# Patient Record
Sex: FEMALE | Race: WHITE | NOT HISPANIC OR LATINO | Employment: FULL TIME | URBAN - METROPOLITAN AREA
[De-identification: names, ages, dates, MRNs, and addresses within clinical notes are randomized per-mention and may not be internally consistent; named-entity substitution may affect disease eponyms.]

---

## 2017-02-22 DIAGNOSIS — Z79.899 ENCOUNTER FOR LONG-TERM CURRENT USE OF MEDICATION: ICD-10-CM

## 2017-02-22 DIAGNOSIS — D84.9 IMMUNOSUPPRESSED STATUS (H): ICD-10-CM

## 2017-02-22 DIAGNOSIS — Z48.298 AFTERCARE FOLLOWING ORGAN TRANSPLANT: ICD-10-CM

## 2017-02-22 DIAGNOSIS — Z94.0 KIDNEY REPLACED BY TRANSPLANT: ICD-10-CM

## 2017-02-22 LAB
ANION GAP SERPL CALCULATED.3IONS-SCNC: 9 MMOL/L (ref 3–14)
BUN SERPL-MCNC: 18 MG/DL (ref 7–30)
CALCIUM SERPL-MCNC: 9.3 MG/DL (ref 8.5–10.1)
CHLORIDE SERPL-SCNC: 108 MMOL/L (ref 94–109)
CO2 SERPL-SCNC: 24 MMOL/L (ref 20–32)
CREAT SERPL-MCNC: 1.25 MG/DL (ref 0.52–1.04)
ERYTHROCYTE [DISTWIDTH] IN BLOOD BY AUTOMATED COUNT: 13.8 % (ref 10–15)
GFR SERPL CREATININE-BSD FRML MDRD: 45 ML/MIN/1.7M2
GLUCOSE SERPL-MCNC: 115 MG/DL (ref 70–99)
HCT VFR BLD AUTO: 40.3 % (ref 35–47)
HGB BLD-MCNC: 13.3 G/DL (ref 11.7–15.7)
MCH RBC QN AUTO: 28.4 PG (ref 26.5–33)
MCHC RBC AUTO-ENTMCNC: 33 G/DL (ref 31.5–36.5)
MCV RBC AUTO: 86 FL (ref 78–100)
PLATELET # BLD AUTO: 208 10E9/L (ref 150–450)
POTASSIUM SERPL-SCNC: 4 MMOL/L (ref 3.4–5.3)
RBC # BLD AUTO: 4.69 10E12/L (ref 3.8–5.2)
SODIUM SERPL-SCNC: 141 MMOL/L (ref 133–144)
TACROLIMUS BLD-MCNC: 3 UG/L (ref 5–15)
TME LAST DOSE: ABNORMAL H
WBC # BLD AUTO: 4.9 10E9/L (ref 4–11)

## 2017-02-22 PROCEDURE — 36415 COLL VENOUS BLD VENIPUNCTURE: CPT | Performed by: FAMILY MEDICINE

## 2017-02-22 PROCEDURE — 80048 BASIC METABOLIC PNL TOTAL CA: CPT | Performed by: FAMILY MEDICINE

## 2017-02-22 PROCEDURE — 80180 DRUG SCRN QUAN MYCOPHENOLATE: CPT | Performed by: FAMILY MEDICINE

## 2017-02-22 PROCEDURE — 80197 ASSAY OF TACROLIMUS: CPT | Performed by: FAMILY MEDICINE

## 2017-02-22 PROCEDURE — 85027 COMPLETE CBC AUTOMATED: CPT | Performed by: FAMILY MEDICINE

## 2017-02-24 LAB
MYCOPHENOLATE SERPL LC/MS/MS-MCNC: 1.94 MG/L (ref 1–3.5)
MYCOPHENOLATE-G SERPL LC/MS/MS-MCNC: 39.5 MG/L (ref 30–95)
TME LAST DOSE: NORMAL H

## 2017-04-19 DIAGNOSIS — D84.9 IMMUNOSUPPRESSED STATUS (H): ICD-10-CM

## 2017-04-19 DIAGNOSIS — Z79.899 ENCOUNTER FOR LONG-TERM CURRENT USE OF MEDICATION: ICD-10-CM

## 2017-04-19 DIAGNOSIS — Z94.0 KIDNEY REPLACED BY TRANSPLANT: ICD-10-CM

## 2017-04-19 DIAGNOSIS — Z48.298 AFTERCARE FOLLOWING ORGAN TRANSPLANT: ICD-10-CM

## 2017-04-19 LAB
ANION GAP SERPL CALCULATED.3IONS-SCNC: 9 MMOL/L (ref 3–14)
BUN SERPL-MCNC: 17 MG/DL (ref 7–30)
CALCIUM SERPL-MCNC: 9.5 MG/DL (ref 8.5–10.1)
CHLORIDE SERPL-SCNC: 108 MMOL/L (ref 94–109)
CO2 SERPL-SCNC: 24 MMOL/L (ref 20–32)
CREAT SERPL-MCNC: 1.31 MG/DL (ref 0.52–1.04)
ERYTHROCYTE [DISTWIDTH] IN BLOOD BY AUTOMATED COUNT: 13.7 % (ref 10–15)
GFR SERPL CREATININE-BSD FRML MDRD: 43 ML/MIN/1.7M2
GLUCOSE SERPL-MCNC: 90 MG/DL (ref 70–99)
HCT VFR BLD AUTO: 41.7 % (ref 35–47)
HGB BLD-MCNC: 14.1 G/DL (ref 11.7–15.7)
MCH RBC QN AUTO: 28.8 PG (ref 26.5–33)
MCHC RBC AUTO-ENTMCNC: 33.8 G/DL (ref 31.5–36.5)
MCV RBC AUTO: 85 FL (ref 78–100)
PLATELET # BLD AUTO: 237 10E9/L (ref 150–450)
POTASSIUM SERPL-SCNC: 3.9 MMOL/L (ref 3.4–5.3)
RBC # BLD AUTO: 4.89 10E12/L (ref 3.8–5.2)
SODIUM SERPL-SCNC: 141 MMOL/L (ref 133–144)
TACROLIMUS BLD-MCNC: 3.9 UG/L (ref 5–15)
TME LAST DOSE: ABNORMAL H
WBC # BLD AUTO: 4.8 10E9/L (ref 4–11)

## 2017-04-19 PROCEDURE — 80197 ASSAY OF TACROLIMUS: CPT | Performed by: INTERNAL MEDICINE

## 2017-04-19 PROCEDURE — 80180 DRUG SCRN QUAN MYCOPHENOLATE: CPT | Performed by: INTERNAL MEDICINE

## 2017-04-19 PROCEDURE — 85027 COMPLETE CBC AUTOMATED: CPT | Performed by: INTERNAL MEDICINE

## 2017-04-19 PROCEDURE — 80048 BASIC METABOLIC PNL TOTAL CA: CPT | Performed by: INTERNAL MEDICINE

## 2017-04-19 PROCEDURE — 36415 COLL VENOUS BLD VENIPUNCTURE: CPT | Performed by: INTERNAL MEDICINE

## 2017-04-20 LAB
MYCOPHENOLATE SERPL LC/MS/MS-MCNC: 1.35 MG/L (ref 1–3.5)
MYCOPHENOLATE-G SERPL LC/MS/MS-MCNC: 42.5 MG/L (ref 30–95)
TME LAST DOSE: NORMAL H

## 2017-06-21 ENCOUNTER — RESULTS ONLY (OUTPATIENT)
Dept: OTHER | Facility: CLINIC | Age: 52
End: 2017-06-21

## 2017-06-21 DIAGNOSIS — Z94.0 KIDNEY REPLACED BY TRANSPLANT: ICD-10-CM

## 2017-06-21 DIAGNOSIS — Z48.298 AFTERCARE FOLLOWING ORGAN TRANSPLANT: ICD-10-CM

## 2017-06-21 DIAGNOSIS — Z79.899 ENCOUNTER FOR LONG-TERM CURRENT USE OF MEDICATION: ICD-10-CM

## 2017-06-21 DIAGNOSIS — D84.9 IMMUNOSUPPRESSED STATUS (H): ICD-10-CM

## 2017-06-21 LAB
ANION GAP SERPL CALCULATED.3IONS-SCNC: 9 MMOL/L (ref 3–14)
BUN SERPL-MCNC: 17 MG/DL (ref 7–30)
CALCIUM SERPL-MCNC: 9.2 MG/DL (ref 8.5–10.1)
CHLORIDE SERPL-SCNC: 108 MMOL/L (ref 94–109)
CO2 SERPL-SCNC: 24 MMOL/L (ref 20–32)
CREAT SERPL-MCNC: 1.22 MG/DL (ref 0.52–1.04)
ERYTHROCYTE [DISTWIDTH] IN BLOOD BY AUTOMATED COUNT: 14.4 % (ref 10–15)
GFR SERPL CREATININE-BSD FRML MDRD: 46 ML/MIN/1.7M2
GLUCOSE SERPL-MCNC: 90 MG/DL (ref 70–99)
HCT VFR BLD AUTO: 41.3 % (ref 35–47)
HGB BLD-MCNC: 13.6 G/DL (ref 11.7–15.7)
MCH RBC QN AUTO: 29.1 PG (ref 26.5–33)
MCHC RBC AUTO-ENTMCNC: 32.9 G/DL (ref 31.5–36.5)
MCV RBC AUTO: 88 FL (ref 78–100)
PLATELET # BLD AUTO: 249 10E9/L (ref 150–450)
POTASSIUM SERPL-SCNC: 3.5 MMOL/L (ref 3.4–5.3)
PROT UR-MCNC: 0.23 G/L
PROT/CREAT 24H UR: 0.14 G/G CR (ref 0–0.2)
RBC # BLD AUTO: 4.67 10E12/L (ref 3.8–5.2)
SODIUM SERPL-SCNC: 141 MMOL/L (ref 133–144)
TACROLIMUS BLD-MCNC: ABNORMAL UG/L (ref 5–15)
TME LAST DOSE: ABNORMAL H
WBC # BLD AUTO: 5.7 10E9/L (ref 4–11)

## 2017-06-21 PROCEDURE — 80197 ASSAY OF TACROLIMUS: CPT | Performed by: INTERNAL MEDICINE

## 2017-06-21 PROCEDURE — 87799 DETECT AGENT NOS DNA QUANT: CPT | Performed by: INTERNAL MEDICINE

## 2017-06-21 PROCEDURE — 80048 BASIC METABOLIC PNL TOTAL CA: CPT | Performed by: INTERNAL MEDICINE

## 2017-06-21 PROCEDURE — 36415 COLL VENOUS BLD VENIPUNCTURE: CPT | Performed by: INTERNAL MEDICINE

## 2017-06-21 PROCEDURE — 85027 COMPLETE CBC AUTOMATED: CPT | Performed by: INTERNAL MEDICINE

## 2017-06-21 PROCEDURE — 80180 DRUG SCRN QUAN MYCOPHENOLATE: CPT | Performed by: INTERNAL MEDICINE

## 2017-06-21 PROCEDURE — 86832 HLA CLASS I HIGH DEFIN QUAL: CPT | Performed by: INTERNAL MEDICINE

## 2017-06-21 PROCEDURE — 84156 ASSAY OF PROTEIN URINE: CPT | Performed by: INTERNAL MEDICINE

## 2017-06-21 PROCEDURE — 86833 HLA CLASS II HIGH DEFIN QUAL: CPT | Performed by: INTERNAL MEDICINE

## 2017-06-22 ENCOUNTER — TELEPHONE (OUTPATIENT)
Dept: TRANSPLANT | Facility: CLINIC | Age: 52
End: 2017-06-22

## 2017-06-22 DIAGNOSIS — Z94.0 KIDNEY REPLACED BY TRANSPLANT: Primary | ICD-10-CM

## 2017-06-22 LAB — PRA DONOR SPECIFIC ABY: NORMAL

## 2017-06-22 NOTE — TELEPHONE ENCOUNTER
Call placed to patient: Patient states that she missed a couple of doses last week and verbalize understanding to recheck level in two weeks. Order placed

## 2017-06-22 NOTE — TELEPHONE ENCOUNTER
ISSUE: tacrolimus = < 3.0 (Goal 3-5)  Previous levels within goal.    LPN task:  Confirm good 12 hour trough and no missed doses.  Confirm taking Tacrolimus 1 mg BID.  Have levels rechecked soon so we can change dose PRN.

## 2017-06-23 LAB
BKV DNA # SPEC NAA+PROBE: NORMAL COPIES/ML
BKV DNA SPEC NAA+PROBE-LOG#: NORMAL LOG COPIES/ML
MYCOPHENOLATE SERPL LC/MS/MS-MCNC: 1.38 MG/L (ref 1–3.5)
MYCOPHENOLATE-G SERPL LC/MS/MS-MCNC: 34.7 MG/L (ref 30–95)
SPECIMEN SOURCE: NORMAL
TME LAST DOSE: NORMAL H

## 2017-07-11 LAB
DONOR IDENTIFICATION: NORMAL
DSA COMMENTS: NORMAL
DSA PRESENT: NO
DSA TEST METHOD: NORMAL
ORGAN: NORMAL
PROTOCOL CUTOFF: NORMAL
SA1 CELL: NORMAL
SA1 COMMENTS: NORMAL
SA1 HI RISK ABY: NORMAL
SA1 MOD RISK ABY: NORMAL
SA1 TEST METHOD: NORMAL
SA2 CELL: NORMAL
SA2 COMMENTS: NORMAL
SA2 HI RISK ABY UA: NORMAL
SA2 MOD RISK ABY: NORMAL
SA2 TEST METHOD: NORMAL
UNACCEPTABLE ANTIGEN: NORMAL
UNOS CPRA: 81

## 2017-08-16 ENCOUNTER — TELEPHONE (OUTPATIENT)
Dept: TRANSPLANT | Facility: CLINIC | Age: 52
End: 2017-08-16

## 2017-08-16 DIAGNOSIS — Z94.0 KIDNEY REPLACED BY TRANSPLANT: ICD-10-CM

## 2017-08-16 DIAGNOSIS — Z94.0 KIDNEY TRANSPLANTED: ICD-10-CM

## 2017-08-16 DIAGNOSIS — Z79.899 ENCOUNTER FOR LONG-TERM CURRENT USE OF MEDICATION: ICD-10-CM

## 2017-08-16 DIAGNOSIS — Z48.298 AFTERCARE FOLLOWING ORGAN TRANSPLANT: ICD-10-CM

## 2017-08-16 DIAGNOSIS — Z94.0 KIDNEY TRANSPLANTED: Primary | ICD-10-CM

## 2017-08-16 LAB
ANION GAP SERPL CALCULATED.3IONS-SCNC: 10 MMOL/L (ref 3–14)
BUN SERPL-MCNC: 21 MG/DL (ref 7–30)
CALCIUM SERPL-MCNC: 9.3 MG/DL (ref 8.5–10.1)
CHLORIDE SERPL-SCNC: 110 MMOL/L (ref 94–109)
CO2 SERPL-SCNC: 23 MMOL/L (ref 20–32)
CREAT SERPL-MCNC: 1.33 MG/DL (ref 0.52–1.04)
ERYTHROCYTE [DISTWIDTH] IN BLOOD BY AUTOMATED COUNT: 13.6 % (ref 10–15)
GFR SERPL CREATININE-BSD FRML MDRD: 42 ML/MIN/1.7M2
GLUCOSE SERPL-MCNC: 105 MG/DL (ref 70–99)
HCT VFR BLD AUTO: 39.5 % (ref 35–47)
HGB BLD-MCNC: 13.3 G/DL (ref 11.7–15.7)
MCH RBC QN AUTO: 29 PG (ref 26.5–33)
MCHC RBC AUTO-ENTMCNC: 33.7 G/DL (ref 31.5–36.5)
MCV RBC AUTO: 86 FL (ref 78–100)
PLATELET # BLD AUTO: 223 10E9/L (ref 150–450)
POTASSIUM SERPL-SCNC: 3.9 MMOL/L (ref 3.4–5.3)
RBC # BLD AUTO: 4.58 10E12/L (ref 3.8–5.2)
SODIUM SERPL-SCNC: 143 MMOL/L (ref 133–144)
WBC # BLD AUTO: 5.3 10E9/L (ref 4–11)

## 2017-08-16 PROCEDURE — 80180 DRUG SCRN QUAN MYCOPHENOLATE: CPT | Performed by: INTERNAL MEDICINE

## 2017-08-16 PROCEDURE — 80197 ASSAY OF TACROLIMUS: CPT | Performed by: INTERNAL MEDICINE

## 2017-08-16 PROCEDURE — 80048 BASIC METABOLIC PNL TOTAL CA: CPT | Performed by: INTERNAL MEDICINE

## 2017-08-16 PROCEDURE — 36415 COLL VENOUS BLD VENIPUNCTURE: CPT | Performed by: INTERNAL MEDICINE

## 2017-08-16 PROCEDURE — 85027 COMPLETE CBC AUTOMATED: CPT | Performed by: INTERNAL MEDICINE

## 2017-08-16 NOTE — TELEPHONE ENCOUNTER
Spoke to patient who wanted Mycophenolic acid level added back on to her txp lab orders.  Patient states that they have always checked this level at every txp lab draw.

## 2017-08-16 NOTE — TELEPHONE ENCOUNTER
Please add updated lab order in EPIC  Awa was in this am 08/16/17 [facility] lab;  Lab caleb her six month lab with [facility].  Please call Awa she had some questions regarding changing to a new outside nephrologist and medications refills.  Awa's  # [phone]

## 2017-08-17 LAB
TACROLIMUS BLD-MCNC: 3.3 UG/L (ref 5–15)
TME LAST DOSE: ABNORMAL H

## 2017-08-21 LAB
MYCOPHENOLATE SERPL LC/MS/MS-MCNC: 1.7 MG/L (ref 1–3.5)
MYCOPHENOLATE-G SERPL LC/MS/MS-MCNC: 37.6 MG/L (ref 30–95)
TME LAST DOSE: NORMAL H

## 2017-08-29 ENCOUNTER — TRANSFERRED RECORDS (OUTPATIENT)
Dept: HEALTH INFORMATION MANAGEMENT | Facility: CLINIC | Age: 52
End: 2017-08-29

## 2017-09-06 DIAGNOSIS — Z94.0 KIDNEY TRANSPLANTED: Primary | ICD-10-CM

## 2017-09-06 RX ORDER — SULFAMETHOXAZOLE AND TRIMETHOPRIM 400; 80 MG/1; MG/1
1 TABLET ORAL EVERY OTHER DAY
Qty: 45 TABLET | Refills: 0 | Status: SHIPPED | OUTPATIENT
Start: 2017-09-06 | End: 2020-06-10

## 2017-09-08 ENCOUNTER — TRANSFERRED RECORDS (OUTPATIENT)
Dept: HEALTH INFORMATION MANAGEMENT | Facility: CLINIC | Age: 52
End: 2017-09-08

## 2017-11-02 DIAGNOSIS — Z79.899 ENCOUNTER FOR LONG-TERM CURRENT USE OF MEDICATION: ICD-10-CM

## 2017-11-02 DIAGNOSIS — Z48.298 AFTERCARE FOLLOWING ORGAN TRANSPLANT: ICD-10-CM

## 2017-11-02 DIAGNOSIS — Z94.0 KIDNEY REPLACED BY TRANSPLANT: ICD-10-CM

## 2017-11-02 DIAGNOSIS — Z94.0 KIDNEY TRANSPLANTED: ICD-10-CM

## 2017-11-02 LAB
ERYTHROCYTE [DISTWIDTH] IN BLOOD BY AUTOMATED COUNT: 14.1 % (ref 10–15)
HCT VFR BLD AUTO: 41.4 % (ref 35–47)
HGB BLD-MCNC: 13.5 G/DL (ref 11.7–15.7)
MCH RBC QN AUTO: 28.5 PG (ref 26.5–33)
MCHC RBC AUTO-ENTMCNC: 32.6 G/DL (ref 31.5–36.5)
MCV RBC AUTO: 88 FL (ref 78–100)
PLATELET # BLD AUTO: 235 10E9/L (ref 150–450)
RBC # BLD AUTO: 4.73 10E12/L (ref 3.8–5.2)
WBC # BLD AUTO: 4.7 10E9/L (ref 4–11)

## 2017-11-02 PROCEDURE — 85027 COMPLETE CBC AUTOMATED: CPT | Performed by: INTERNAL MEDICINE

## 2017-11-02 PROCEDURE — 36415 COLL VENOUS BLD VENIPUNCTURE: CPT | Performed by: INTERNAL MEDICINE

## 2017-11-02 PROCEDURE — 80048 BASIC METABOLIC PNL TOTAL CA: CPT | Performed by: INTERNAL MEDICINE

## 2017-11-02 PROCEDURE — 80197 ASSAY OF TACROLIMUS: CPT | Performed by: INTERNAL MEDICINE

## 2017-11-02 PROCEDURE — 80180 DRUG SCRN QUAN MYCOPHENOLATE: CPT | Performed by: INTERNAL MEDICINE

## 2017-11-03 LAB
ANION GAP SERPL CALCULATED.3IONS-SCNC: 7 MMOL/L (ref 3–14)
BUN SERPL-MCNC: 19 MG/DL (ref 7–30)
CALCIUM SERPL-MCNC: 9.4 MG/DL (ref 8.5–10.1)
CHLORIDE SERPL-SCNC: 106 MMOL/L (ref 94–109)
CO2 SERPL-SCNC: 26 MMOL/L (ref 20–32)
CREAT SERPL-MCNC: 1.21 MG/DL (ref 0.52–1.04)
GFR SERPL CREATININE-BSD FRML MDRD: 47 ML/MIN/1.7M2
GLUCOSE SERPL-MCNC: 122 MG/DL (ref 70–99)
POTASSIUM SERPL-SCNC: 4.3 MMOL/L (ref 3.4–5.3)
SODIUM SERPL-SCNC: 139 MMOL/L (ref 133–144)
TACROLIMUS BLD-MCNC: 5.1 UG/L (ref 5–15)
TME LAST DOSE: NORMAL H

## 2017-11-04 LAB
MYCOPHENOLATE SERPL LC/MS/MS-MCNC: 1.48 MG/L (ref 1–3.5)
MYCOPHENOLATE-G SERPL LC/MS/MS-MCNC: 41.3 MG/L (ref 30–95)
TME LAST DOSE: NORMAL H

## 2018-01-10 ENCOUNTER — TELEPHONE (OUTPATIENT)
Dept: TRANSPLANT | Facility: CLINIC | Age: 53
End: 2018-01-10

## 2018-01-10 DIAGNOSIS — Z94.0 KIDNEY TRANSPLANTED: ICD-10-CM

## 2018-01-10 DIAGNOSIS — Z48.298 AFTERCARE FOLLOWING ORGAN TRANSPLANT: Primary | ICD-10-CM

## 2018-01-10 DIAGNOSIS — Z94.0 KIDNEY REPLACED BY TRANSPLANT: ICD-10-CM

## 2018-01-10 DIAGNOSIS — Z79.899 ENCOUNTER FOR LONG-TERM CURRENT USE OF MEDICATION: ICD-10-CM

## 2018-01-10 DIAGNOSIS — Z48.298 AFTERCARE FOLLOWING ORGAN TRANSPLANT: ICD-10-CM

## 2018-01-10 LAB
ANION GAP SERPL CALCULATED.3IONS-SCNC: 10 MMOL/L (ref 3–14)
BUN SERPL-MCNC: 16 MG/DL (ref 7–30)
CALCIUM SERPL-MCNC: 8.9 MG/DL (ref 8.5–10.1)
CHLORIDE SERPL-SCNC: 108 MMOL/L (ref 94–109)
CO2 SERPL-SCNC: 23 MMOL/L (ref 20–32)
CREAT SERPL-MCNC: 1.17 MG/DL (ref 0.52–1.04)
ERYTHROCYTE [DISTWIDTH] IN BLOOD BY AUTOMATED COUNT: 13.5 % (ref 10–15)
GFR SERPL CREATININE-BSD FRML MDRD: 48 ML/MIN/1.7M2
GLUCOSE SERPL-MCNC: 125 MG/DL (ref 70–99)
HCT VFR BLD AUTO: 40.1 % (ref 35–47)
HGB BLD-MCNC: 13.7 G/DL (ref 11.7–15.7)
MCH RBC QN AUTO: 29.4 PG (ref 26.5–33)
MCHC RBC AUTO-ENTMCNC: 34.2 G/DL (ref 31.5–36.5)
MCV RBC AUTO: 86 FL (ref 78–100)
PLATELET # BLD AUTO: 244 10E9/L (ref 150–450)
POTASSIUM SERPL-SCNC: 4 MMOL/L (ref 3.4–5.3)
RBC # BLD AUTO: 4.66 10E12/L (ref 3.8–5.2)
SODIUM SERPL-SCNC: 141 MMOL/L (ref 133–144)
TACROLIMUS BLD-MCNC: 5.3 UG/L (ref 5–15)
TME LAST DOSE: 700 H
WBC # BLD AUTO: 6 10E9/L (ref 4–11)

## 2018-01-10 PROCEDURE — 80197 ASSAY OF TACROLIMUS: CPT | Performed by: INTERNAL MEDICINE

## 2018-01-10 PROCEDURE — 80048 BASIC METABOLIC PNL TOTAL CA: CPT | Performed by: INTERNAL MEDICINE

## 2018-01-10 PROCEDURE — 36415 COLL VENOUS BLD VENIPUNCTURE: CPT | Performed by: INTERNAL MEDICINE

## 2018-01-10 PROCEDURE — 85027 COMPLETE CBC AUTOMATED: CPT | Performed by: INTERNAL MEDICINE

## 2018-01-10 PROCEDURE — 80180 DRUG SCRN QUAN MYCOPHENOLATE: CPT | Performed by: INTERNAL MEDICINE

## 2018-01-10 NOTE — LETTER
[facility]  Room 2-200  [facility],  [address]  [address]  Tel [phone]  Toll Free [phone]                OUTPATIENT LABORATORY TEST ORDER    Patient Name: Awa Alexander  Transplant Date: 7/22/2011 (Kidney)  YOB: 1965  Issue Date & Time:January 10, 37187:36 AM    [facility] MR:  [id number]  Exp. Date (1 year after date issued)      Diagnoses: Kidney Transplant (ICD-10  Z94.0)   Long term use of medications (ICD-10  Z79.899)     Lab results to be available on the same day drawn.   Patient should release information to the [facility], [facility].  Please fax to the Transplant Center at [phone].    Every 3 Months    ?Hemogram and Platelet   ?Basic Metabolic Panel (Sodium, Potassium, Chloride, CO2, Creatinine, BUN, Glucose, Calcium)   ?/Tacrolimus/Prograf drug level (mail to [facility] - mailers and instructions provided by the patient)            Every 6 Months                  ?Urine for protein/creatinine        If you have any questions, please call [facility] at [phone] or [phone].    Please fax labs to [phone]

## 2018-01-12 LAB
MYCOPHENOLATE SERPL LC/MS/MS-MCNC: 1.32 MG/L (ref 1–3.5)
MYCOPHENOLATE-G SERPL LC/MS/MS-MCNC: 27.3 MG/L (ref 30–95)
TME LAST DOSE: 700 H

## 2018-04-11 DIAGNOSIS — Z94.0 KIDNEY TRANSPLANTED: ICD-10-CM

## 2018-04-11 DIAGNOSIS — Z48.298 AFTERCARE FOLLOWING ORGAN TRANSPLANT: ICD-10-CM

## 2018-04-11 DIAGNOSIS — Z94.0 KIDNEY REPLACED BY TRANSPLANT: ICD-10-CM

## 2018-04-11 DIAGNOSIS — Z79.899 ENCOUNTER FOR LONG-TERM CURRENT USE OF MEDICATION: ICD-10-CM

## 2018-04-11 LAB
ANION GAP SERPL CALCULATED.3IONS-SCNC: 8 MMOL/L (ref 3–14)
BUN SERPL-MCNC: 19 MG/DL (ref 7–30)
CALCIUM SERPL-MCNC: 9.2 MG/DL (ref 8.5–10.1)
CHLORIDE SERPL-SCNC: 111 MMOL/L (ref 94–109)
CO2 SERPL-SCNC: 24 MMOL/L (ref 20–32)
CREAT SERPL-MCNC: 1.41 MG/DL (ref 0.52–1.04)
ERYTHROCYTE [DISTWIDTH] IN BLOOD BY AUTOMATED COUNT: 14.6 % (ref 10–15)
GFR SERPL CREATININE-BSD FRML MDRD: 39 ML/MIN/1.7M2
GLUCOSE SERPL-MCNC: 50 MG/DL (ref 70–99)
HCT VFR BLD AUTO: 43.5 % (ref 35–47)
HGB BLD-MCNC: 14.1 G/DL (ref 11.7–15.7)
MCH RBC QN AUTO: 28 PG (ref 26.5–33)
MCHC RBC AUTO-ENTMCNC: 32.4 G/DL (ref 31.5–36.5)
MCV RBC AUTO: 86 FL (ref 78–100)
PLATELET # BLD AUTO: 263 10E9/L (ref 150–450)
POTASSIUM SERPL-SCNC: 4 MMOL/L (ref 3.4–5.3)
RBC # BLD AUTO: 5.04 10E12/L (ref 3.8–5.2)
SODIUM SERPL-SCNC: 143 MMOL/L (ref 133–144)
TACROLIMUS BLD-MCNC: 5.7 UG/L (ref 5–15)
TME LAST DOSE: NORMAL H
WBC # BLD AUTO: 6.3 10E9/L (ref 4–11)

## 2018-04-11 PROCEDURE — 36415 COLL VENOUS BLD VENIPUNCTURE: CPT | Performed by: INTERNAL MEDICINE

## 2018-04-11 PROCEDURE — 85027 COMPLETE CBC AUTOMATED: CPT | Performed by: INTERNAL MEDICINE

## 2018-04-11 PROCEDURE — 80048 BASIC METABOLIC PNL TOTAL CA: CPT | Performed by: INTERNAL MEDICINE

## 2018-04-11 PROCEDURE — 80197 ASSAY OF TACROLIMUS: CPT | Performed by: INTERNAL MEDICINE

## 2018-04-11 PROCEDURE — 80180 DRUG SCRN QUAN MYCOPHENOLATE: CPT | Performed by: INTERNAL MEDICINE

## 2018-04-14 LAB
MYCOPHENOLATE SERPL LC/MS/MS-MCNC: 1.99 MG/L (ref 1–3.5)
MYCOPHENOLATE-G SERPL LC/MS/MS-MCNC: 42.2 MG/L (ref 30–95)
TME LAST DOSE: NORMAL H

## 2018-07-11 DIAGNOSIS — Z79.899 ENCOUNTER FOR LONG-TERM CURRENT USE OF MEDICATION: ICD-10-CM

## 2018-07-11 DIAGNOSIS — Z94.0 KIDNEY TRANSPLANTED: ICD-10-CM

## 2018-07-11 DIAGNOSIS — Z94.0 KIDNEY REPLACED BY TRANSPLANT: ICD-10-CM

## 2018-07-11 DIAGNOSIS — Z48.298 AFTERCARE FOLLOWING ORGAN TRANSPLANT: ICD-10-CM

## 2018-07-11 LAB
ANION GAP SERPL CALCULATED.3IONS-SCNC: 13 MMOL/L (ref 3–14)
BUN SERPL-MCNC: 22 MG/DL (ref 7–30)
CALCIUM SERPL-MCNC: 9.1 MG/DL (ref 8.5–10.1)
CHLORIDE SERPL-SCNC: 107 MMOL/L (ref 94–109)
CO2 SERPL-SCNC: 19 MMOL/L (ref 20–32)
CREAT SERPL-MCNC: 1.25 MG/DL (ref 0.52–1.04)
ERYTHROCYTE [DISTWIDTH] IN BLOOD BY AUTOMATED COUNT: 13.7 % (ref 10–15)
GFR SERPL CREATININE-BSD FRML MDRD: 45 ML/MIN/1.7M2
GLUCOSE SERPL-MCNC: 143 MG/DL (ref 70–99)
HCT VFR BLD AUTO: 40.5 % (ref 35–47)
HGB BLD-MCNC: 13.5 G/DL (ref 11.7–15.7)
MCH RBC QN AUTO: 29.7 PG (ref 26.5–33)
MCHC RBC AUTO-ENTMCNC: 33.3 G/DL (ref 31.5–36.5)
MCV RBC AUTO: 89 FL (ref 78–100)
PLATELET # BLD AUTO: 222 10E9/L (ref 150–450)
POTASSIUM SERPL-SCNC: 4.2 MMOL/L (ref 3.4–5.3)
RBC # BLD AUTO: 4.54 10E12/L (ref 3.8–5.2)
SODIUM SERPL-SCNC: 139 MMOL/L (ref 133–144)
TACROLIMUS BLD-MCNC: 6.3 UG/L (ref 5–15)
TME LAST DOSE: NORMAL H
WBC # BLD AUTO: 5.8 10E9/L (ref 4–11)

## 2018-07-11 PROCEDURE — 80197 ASSAY OF TACROLIMUS: CPT | Performed by: INTERNAL MEDICINE

## 2018-07-11 PROCEDURE — 80048 BASIC METABOLIC PNL TOTAL CA: CPT | Performed by: INTERNAL MEDICINE

## 2018-07-11 PROCEDURE — 85027 COMPLETE CBC AUTOMATED: CPT | Performed by: INTERNAL MEDICINE

## 2018-07-11 PROCEDURE — 36415 COLL VENOUS BLD VENIPUNCTURE: CPT | Performed by: INTERNAL MEDICINE

## 2018-07-11 PROCEDURE — 80180 DRUG SCRN QUAN MYCOPHENOLATE: CPT | Performed by: INTERNAL MEDICINE

## 2018-07-12 ENCOUNTER — TELEPHONE (OUTPATIENT)
Dept: TRANSPLANT | Facility: CLINIC | Age: 53
End: 2018-07-12

## 2018-07-12 DIAGNOSIS — Z94.0 KIDNEY TRANSPLANTED: Primary | ICD-10-CM

## 2018-07-12 NOTE — TELEPHONE ENCOUNTER
ISSUE:  Tacrolimus level 6.3 Goal 3-5    OUTCOME:   Spoke with pt regarding drug level.  Pt verbalized good trough level and current tacrolimus dose 1 mg BID.  Pt denies recent illnesses, diarrhea or medication changes.    No dose change at this time.  Recommended pt repeat labs in 1-2 weeks and then make dose change if needed based on repeat.   Pt follows with Dr Nevarez in [facility].  Pt will f/u with him regarding level and our recommendation.  Orders placed.     Pt questions answered, pt v/u.

## 2018-07-13 LAB
MYCOPHENOLATE SERPL LC/MS/MS-MCNC: 2.06 MG/L (ref 1–3.5)
MYCOPHENOLATE-G SERPL LC/MS/MS-MCNC: 27.8 MG/L (ref 30–95)
TME LAST DOSE: ABNORMAL H

## 2018-08-13 ENCOUNTER — TRANSFERRED RECORDS (OUTPATIENT)
Dept: HEALTH INFORMATION MANAGEMENT | Facility: CLINIC | Age: 53
End: 2018-08-13

## 2018-10-12 ENCOUNTER — TELEPHONE (OUTPATIENT)
Dept: LAB | Facility: CLINIC | Age: 53
End: 2018-10-12

## 2018-10-17 DIAGNOSIS — Z94.0 KIDNEY REPLACED BY TRANSPLANT: ICD-10-CM

## 2018-10-17 DIAGNOSIS — Z48.298 AFTERCARE FOLLOWING ORGAN TRANSPLANT: ICD-10-CM

## 2018-10-17 DIAGNOSIS — Z79.899 ENCOUNTER FOR LONG-TERM CURRENT USE OF MEDICATION: ICD-10-CM

## 2018-10-17 LAB
ANION GAP SERPL CALCULATED.3IONS-SCNC: 10 MMOL/L (ref 3–14)
BUN SERPL-MCNC: 21 MG/DL (ref 7–30)
CALCIUM SERPL-MCNC: 9.9 MG/DL (ref 8.5–10.1)
CHLORIDE SERPL-SCNC: 104 MMOL/L (ref 94–109)
CO2 SERPL-SCNC: 24 MMOL/L (ref 20–32)
CREAT SERPL-MCNC: 1.24 MG/DL (ref 0.52–1.04)
ERYTHROCYTE [DISTWIDTH] IN BLOOD BY AUTOMATED COUNT: 13.4 % (ref 10–15)
GFR SERPL CREATININE-BSD FRML MDRD: 45 ML/MIN/1.7M2
GLUCOSE SERPL-MCNC: 102 MG/DL (ref 70–99)
HCT VFR BLD AUTO: 39.6 % (ref 35–47)
HGB BLD-MCNC: 13.2 G/DL (ref 11.7–15.7)
MCH RBC QN AUTO: 29.4 PG (ref 26.5–33)
MCHC RBC AUTO-ENTMCNC: 33.3 G/DL (ref 31.5–36.5)
MCV RBC AUTO: 88 FL (ref 78–100)
PLATELET # BLD AUTO: 215 10E9/L (ref 150–450)
POTASSIUM SERPL-SCNC: 4.1 MMOL/L (ref 3.4–5.3)
RBC # BLD AUTO: 4.49 10E12/L (ref 3.8–5.2)
SODIUM SERPL-SCNC: 138 MMOL/L (ref 133–144)
TACROLIMUS BLD-MCNC: 4.9 UG/L (ref 5–15)
TME LAST DOSE: ABNORMAL H
WBC # BLD AUTO: 4.9 10E9/L (ref 4–11)

## 2018-10-17 PROCEDURE — 80048 BASIC METABOLIC PNL TOTAL CA: CPT | Performed by: INTERNAL MEDICINE

## 2018-10-17 PROCEDURE — 80197 ASSAY OF TACROLIMUS: CPT | Performed by: INTERNAL MEDICINE

## 2018-10-17 PROCEDURE — 85027 COMPLETE CBC AUTOMATED: CPT | Performed by: INTERNAL MEDICINE

## 2018-10-17 PROCEDURE — 36415 COLL VENOUS BLD VENIPUNCTURE: CPT | Performed by: INTERNAL MEDICINE

## 2019-01-16 DIAGNOSIS — Z48.298 AFTERCARE FOLLOWING ORGAN TRANSPLANT: ICD-10-CM

## 2019-01-16 DIAGNOSIS — Z94.0 KIDNEY TRANSPLANTED: ICD-10-CM

## 2019-01-16 DIAGNOSIS — Z79.899 ENCOUNTER FOR LONG-TERM CURRENT USE OF MEDICATION: ICD-10-CM

## 2019-01-16 DIAGNOSIS — Z94.0 KIDNEY REPLACED BY TRANSPLANT: ICD-10-CM

## 2019-01-16 LAB
ANION GAP SERPL CALCULATED.3IONS-SCNC: 7 MMOL/L (ref 3–14)
BUN SERPL-MCNC: 17 MG/DL (ref 7–30)
CALCIUM SERPL-MCNC: 9.7 MG/DL (ref 8.5–10.1)
CHLORIDE SERPL-SCNC: 107 MMOL/L (ref 94–109)
CO2 SERPL-SCNC: 23 MMOL/L (ref 20–32)
CREAT SERPL-MCNC: 1.09 MG/DL (ref 0.52–1.04)
ERYTHROCYTE [DISTWIDTH] IN BLOOD BY AUTOMATED COUNT: 13.7 % (ref 10–15)
GFR SERPL CREATININE-BSD FRML MDRD: 57 ML/MIN/{1.73_M2}
GLUCOSE SERPL-MCNC: 132 MG/DL (ref 70–99)
HCT VFR BLD AUTO: 40 % (ref 35–47)
HGB BLD-MCNC: 13.4 G/DL (ref 11.7–15.7)
MCH RBC QN AUTO: 29.1 PG (ref 26.5–33)
MCHC RBC AUTO-ENTMCNC: 33.5 G/DL (ref 31.5–36.5)
MCV RBC AUTO: 87 FL (ref 78–100)
PLATELET # BLD AUTO: 216 10E9/L (ref 150–450)
POTASSIUM SERPL-SCNC: 4.1 MMOL/L (ref 3.4–5.3)
PROT UR-MCNC: 0.18 G/L
PROT/CREAT 24H UR: 0.17 G/G CR (ref 0–0.2)
RBC # BLD AUTO: 4.6 10E12/L (ref 3.8–5.2)
SODIUM SERPL-SCNC: 137 MMOL/L (ref 133–144)
TACROLIMUS BLD-MCNC: 3.3 UG/L (ref 5–15)
TME LAST DOSE: ABNORMAL H
WBC # BLD AUTO: 5.1 10E9/L (ref 4–11)

## 2019-01-16 PROCEDURE — 84156 ASSAY OF PROTEIN URINE: CPT | Performed by: INTERNAL MEDICINE

## 2019-01-16 PROCEDURE — 36415 COLL VENOUS BLD VENIPUNCTURE: CPT | Performed by: INTERNAL MEDICINE

## 2019-01-16 PROCEDURE — 80180 DRUG SCRN QUAN MYCOPHENOLATE: CPT | Performed by: INTERNAL MEDICINE

## 2019-01-16 PROCEDURE — 85027 COMPLETE CBC AUTOMATED: CPT | Performed by: INTERNAL MEDICINE

## 2019-01-16 PROCEDURE — 80048 BASIC METABOLIC PNL TOTAL CA: CPT | Performed by: INTERNAL MEDICINE

## 2019-01-16 PROCEDURE — 80197 ASSAY OF TACROLIMUS: CPT | Performed by: INTERNAL MEDICINE

## 2019-01-18 LAB
MYCOPHENOLATE SERPL LC/MS/MS-MCNC: 0.65 MG/L (ref 1–3.5)
MYCOPHENOLATE-G SERPL LC/MS/MS-MCNC: 21 MG/L (ref 30–95)
TME LAST DOSE: ABNORMAL H

## 2019-04-17 ENCOUNTER — TELEPHONE (OUTPATIENT)
Dept: TRANSPLANT | Facility: CLINIC | Age: 54
End: 2019-04-17

## 2019-04-17 DIAGNOSIS — Z48.298 AFTERCARE FOLLOWING ORGAN TRANSPLANT: ICD-10-CM

## 2019-04-17 DIAGNOSIS — Z79.899 ENCOUNTER FOR LONG-TERM CURRENT USE OF MEDICATION: ICD-10-CM

## 2019-04-17 DIAGNOSIS — Z48.298 AFTERCARE FOLLOWING ORGAN TRANSPLANT: Primary | ICD-10-CM

## 2019-04-17 DIAGNOSIS — Z94.0 KIDNEY TRANSPLANTED: ICD-10-CM

## 2019-04-17 DIAGNOSIS — Z94.0 KIDNEY REPLACED BY TRANSPLANT: ICD-10-CM

## 2019-04-17 LAB
ERYTHROCYTE [DISTWIDTH] IN BLOOD BY AUTOMATED COUNT: 13.6 % (ref 10–15)
HCT VFR BLD AUTO: 41.3 % (ref 35–47)
HGB BLD-MCNC: 14.1 G/DL (ref 11.7–15.7)
MCH RBC QN AUTO: 29.7 PG (ref 26.5–33)
MCHC RBC AUTO-ENTMCNC: 34.1 G/DL (ref 31.5–36.5)
MCV RBC AUTO: 87 FL (ref 78–100)
PLATELET # BLD AUTO: 236 10E9/L (ref 150–450)
RBC # BLD AUTO: 4.74 10E12/L (ref 3.8–5.2)
WBC # BLD AUTO: 5.4 10E9/L (ref 4–11)

## 2019-04-17 PROCEDURE — 80197 ASSAY OF TACROLIMUS: CPT | Performed by: INTERNAL MEDICINE

## 2019-04-17 PROCEDURE — 85027 COMPLETE CBC AUTOMATED: CPT | Performed by: INTERNAL MEDICINE

## 2019-04-17 PROCEDURE — 36415 COLL VENOUS BLD VENIPUNCTURE: CPT | Performed by: INTERNAL MEDICINE

## 2019-04-17 PROCEDURE — 80048 BASIC METABOLIC PNL TOTAL CA: CPT | Performed by: INTERNAL MEDICINE

## 2019-04-17 PROCEDURE — 80180 DRUG SCRN QUAN MYCOPHENOLATE: CPT | Performed by: INTERNAL MEDICINE

## 2019-04-17 NOTE — TELEPHONE ENCOUNTER
Patient Call: Transplant Lab/Orders  Route to LPN  Post Transplant Days: [phone]  When patient is less than 60 days post-transplant, route high priority    Reason for Call: Annual lab reorder- orders in Epic - labs drawn and pending orders   Callback needed? No

## 2019-04-18 ENCOUNTER — TELEPHONE (OUTPATIENT)
Dept: TRANSPLANT | Facility: CLINIC | Age: 54
End: 2019-04-18

## 2019-04-18 LAB
ANION GAP SERPL CALCULATED.3IONS-SCNC: 8 MMOL/L (ref 3–14)
BUN SERPL-MCNC: 15 MG/DL (ref 7–30)
CALCIUM SERPL-MCNC: 9.7 MG/DL (ref 8.5–10.1)
CHLORIDE SERPL-SCNC: 105 MMOL/L (ref 94–109)
CO2 SERPL-SCNC: 24 MMOL/L (ref 20–32)
CREAT SERPL-MCNC: 1.19 MG/DL (ref 0.52–1.04)
GFR SERPL CREATININE-BSD FRML MDRD: 52 ML/MIN/{1.73_M2}
GLUCOSE SERPL-MCNC: 193 MG/DL (ref 70–99)
POTASSIUM SERPL-SCNC: 4.4 MMOL/L (ref 3.4–5.3)
SODIUM SERPL-SCNC: 137 MMOL/L (ref 133–144)
TACROLIMUS BLD-MCNC: 3 UG/L (ref 5–15)
TME LAST DOSE: 7 H

## 2019-04-18 NOTE — TELEPHONE ENCOUNTER
Call Awa Alexander and make sure she informs PCP or whoever is managing her blood sugars is aware it is elevated.  Might need changes with Insulin dose.

## 2019-04-18 NOTE — TELEPHONE ENCOUNTER
Call placed to patient. Patient notes that she has a concussion and was informed that her sugars would go up. Patient that all providers are aware of her numbers.

## 2019-04-19 LAB
MYCOPHENOLATE SERPL LC/MS/MS-MCNC: 0.82 MG/L (ref 1–3.5)
MYCOPHENOLATE-G SERPL LC/MS/MS-MCNC: 31.6 MG/L (ref 30–95)
TME LAST DOSE: 7 H

## 2019-07-17 ENCOUNTER — TELEPHONE (OUTPATIENT)
Dept: TRANSPLANT | Facility: CLINIC | Age: 54
End: 2019-07-17

## 2019-07-17 DIAGNOSIS — Z94.0 KIDNEY REPLACED BY TRANSPLANT: ICD-10-CM

## 2019-07-17 DIAGNOSIS — Z79.899 ENCOUNTER FOR LONG-TERM CURRENT USE OF MEDICATION: ICD-10-CM

## 2019-07-17 DIAGNOSIS — Z48.298 AFTERCARE FOLLOWING ORGAN TRANSPLANT: Primary | ICD-10-CM

## 2019-07-17 DIAGNOSIS — Z48.298 AFTERCARE FOLLOWING ORGAN TRANSPLANT: ICD-10-CM

## 2019-07-17 LAB
ANION GAP SERPL CALCULATED.3IONS-SCNC: 11 MMOL/L (ref 3–14)
BUN SERPL-MCNC: 25 MG/DL (ref 7–30)
CALCIUM SERPL-MCNC: 9.7 MG/DL (ref 8.5–10.1)
CHLORIDE SERPL-SCNC: 108 MMOL/L (ref 94–109)
CO2 SERPL-SCNC: 21 MMOL/L (ref 20–32)
CREAT SERPL-MCNC: 1.19 MG/DL (ref 0.52–1.04)
ERYTHROCYTE [DISTWIDTH] IN BLOOD BY AUTOMATED COUNT: 13.3 % (ref 10–15)
GFR SERPL CREATININE-BSD FRML MDRD: 51 ML/MIN/{1.73_M2}
GLUCOSE SERPL-MCNC: 116 MG/DL (ref 70–99)
HCT VFR BLD AUTO: 39 % (ref 35–47)
HGB BLD-MCNC: 13.4 G/DL (ref 11.7–15.7)
MCH RBC QN AUTO: 29.8 PG (ref 26.5–33)
MCHC RBC AUTO-ENTMCNC: 34.4 G/DL (ref 31.5–36.5)
MCV RBC AUTO: 87 FL (ref 78–100)
PLATELET # BLD AUTO: 259 10E9/L (ref 150–450)
POTASSIUM SERPL-SCNC: 3.8 MMOL/L (ref 3.4–5.3)
PROT UR-MCNC: 0.23 G/L
PROT/CREAT 24H UR: 0.25 G/G CR (ref 0–0.2)
RBC # BLD AUTO: 4.5 10E12/L (ref 3.8–5.2)
SODIUM SERPL-SCNC: 140 MMOL/L (ref 133–144)
TACROLIMUS BLD-MCNC: 3.1 UG/L (ref 5–15)
TME LAST DOSE: ABNORMAL H
WBC # BLD AUTO: 6 10E9/L (ref 4–11)

## 2019-07-17 PROCEDURE — 36415 COLL VENOUS BLD VENIPUNCTURE: CPT | Performed by: INTERNAL MEDICINE

## 2019-07-17 PROCEDURE — 80197 ASSAY OF TACROLIMUS: CPT | Performed by: INTERNAL MEDICINE

## 2019-07-17 PROCEDURE — 80048 BASIC METABOLIC PNL TOTAL CA: CPT | Performed by: INTERNAL MEDICINE

## 2019-07-17 PROCEDURE — 85027 COMPLETE CBC AUTOMATED: CPT | Performed by: INTERNAL MEDICINE

## 2019-07-17 PROCEDURE — 84156 ASSAY OF PROTEIN URINE: CPT | Performed by: INTERNAL MEDICINE

## 2019-07-17 NOTE — TELEPHONE ENCOUNTER
Patient Call: Transplant Lab/Orders  Route to LPN  Post Transplant Days: [phone]  When patient is less than 60 days post-transplant, route high priority    Reason for Call: Callback needed? Updated labs in epic for [facility]    labs have been drawn

## 2019-10-16 DIAGNOSIS — Z94.0 KIDNEY REPLACED BY TRANSPLANT: ICD-10-CM

## 2019-10-16 DIAGNOSIS — Z79.899 ENCOUNTER FOR LONG-TERM CURRENT USE OF MEDICATION: ICD-10-CM

## 2019-10-16 DIAGNOSIS — Z48.298 AFTERCARE FOLLOWING ORGAN TRANSPLANT: ICD-10-CM

## 2019-10-16 LAB
ANION GAP SERPL CALCULATED.3IONS-SCNC: 11 MMOL/L (ref 3–14)
BUN SERPL-MCNC: 26 MG/DL (ref 7–30)
CALCIUM SERPL-MCNC: 9.9 MG/DL (ref 8.5–10.1)
CHLORIDE SERPL-SCNC: 104 MMOL/L (ref 94–109)
CO2 SERPL-SCNC: 22 MMOL/L (ref 20–32)
CREAT SERPL-MCNC: 1.57 MG/DL (ref 0.52–1.04)
ERYTHROCYTE [DISTWIDTH] IN BLOOD BY AUTOMATED COUNT: 13.4 % (ref 10–15)
GFR SERPL CREATININE-BSD FRML MDRD: 37 ML/MIN/{1.73_M2}
GLUCOSE SERPL-MCNC: 137 MG/DL (ref 70–99)
HCT VFR BLD AUTO: 38.2 % (ref 35–47)
HGB BLD-MCNC: 12.6 G/DL (ref 11.7–15.7)
MCH RBC QN AUTO: 29.1 PG (ref 26.5–33)
MCHC RBC AUTO-ENTMCNC: 33 G/DL (ref 31.5–36.5)
MCV RBC AUTO: 88 FL (ref 78–100)
PLATELET # BLD AUTO: 296 10E9/L (ref 150–450)
POTASSIUM SERPL-SCNC: 4.3 MMOL/L (ref 3.4–5.3)
RBC # BLD AUTO: 4.33 10E12/L (ref 3.8–5.2)
SODIUM SERPL-SCNC: 137 MMOL/L (ref 133–144)
TACROLIMUS BLD-MCNC: <3 UG/L (ref 5–15)
TME LAST DOSE: ABNORMAL H
WBC # BLD AUTO: 3.9 10E9/L (ref 4–11)

## 2019-10-16 PROCEDURE — 80197 ASSAY OF TACROLIMUS: CPT | Performed by: INTERNAL MEDICINE

## 2019-10-16 PROCEDURE — 85027 COMPLETE CBC AUTOMATED: CPT | Performed by: INTERNAL MEDICINE

## 2019-10-16 PROCEDURE — 80048 BASIC METABOLIC PNL TOTAL CA: CPT | Performed by: INTERNAL MEDICINE

## 2019-10-16 PROCEDURE — 36415 COLL VENOUS BLD VENIPUNCTURE: CPT | Performed by: INTERNAL MEDICINE

## 2019-10-17 ENCOUNTER — TELEPHONE (OUTPATIENT)
Dept: TRANSPLANT | Facility: CLINIC | Age: 54
End: 2019-10-17

## 2019-10-17 NOTE — TELEPHONE ENCOUNTER
Call placed to patient. No answer. Voice message left instructing patient to f\u with Dr. Nevarez r\t low tacrolimus level and elevated creatinine level.

## 2019-10-17 NOTE — TELEPHONE ENCOUNTER
Issue:  Tac undetectable.  Cr elevated.    Plan:  Awa Alexander managed at [facility].  Attempted to call Shiv[facility] Garner nephrology, was placed on hold x 10 minutes.    LPN task:  Please call Awa Alexander to ensure she follows up with Dr. Nevarez to address increased Cr and low drug level. Tac level should be 3-5.

## 2019-10-18 NOTE — TELEPHONE ENCOUNTER
Awa left  10/17/19 received voice message about getting her labs done.  She will go and get her labs drawn.

## 2019-10-18 NOTE — TELEPHONE ENCOUNTER
Call returned to patient. Patient v\u to f\u with Dr. Nevarez for instructions r\t low tacrolimus level and elevated creatinine.

## 2019-12-09 ENCOUNTER — HEALTH MAINTENANCE LETTER (OUTPATIENT)
Age: 54
End: 2019-12-09

## 2020-01-13 ENCOUNTER — TELEPHONE (OUTPATIENT)
Dept: TRANSPLANT | Facility: CLINIC | Age: 55
End: 2020-01-13

## 2020-01-13 ENCOUNTER — TRANSFERRED RECORDS (OUTPATIENT)
Dept: HEALTH INFORMATION MANAGEMENT | Facility: CLINIC | Age: 55
End: 2020-01-13

## 2020-01-13 NOTE — TELEPHONE ENCOUNTER
IMMUNOSUPPRESSION & GRAFT FUNCTION  Currently in [facility] ED with diarrhea  Creatinine 1.67, above baseline 1.1-1.3.  Tacrolimus level 9.1, above goal of 3-5 for 12-hour trough    PLAN  Called patient in ED. Confirmed that immunosuppression and kidney graft function is managed by her nephrologist, Dr. Mannie Nevarez. She will follow up her emergency department visit with Dr. Nevarez.

## 2020-01-13 NOTE — TELEPHONE ENCOUNTER
Patient Call: General  Route to LPN    Reason for call: pt has been sick for the past 10 days- no appetite intake poor- diarrhea- 15-20 times a day  Weak tired  SOB on exertion  In P/N ER now  Had fluids last week but did not help  She see a Neph at [facility] but her PCP Dr Lopes recommends she get back in contact with us  I gave her the number for ER  Dr to call  Us if needed  To talk with Tx Neph on  Call her Cr was 16 and up now to 2.8    Call back needed? Yes    Return Call Needed  Same as documented in contacts section  When to return call?: Same day: Route High Priority

## 2020-01-13 NOTE — TELEPHONE ENCOUNTER
Returned call to [facility] but did not reach her. Left  message that based on what I heard with shortness of breath and dehydration from diarrhea that she should return to ED. Transplant office number provided should she have further questions.

## 2020-01-20 ENCOUNTER — TELEPHONE (OUTPATIENT)
Dept: TRANSPLANT | Facility: CLINIC | Age: 55
End: 2020-01-20

## 2020-01-20 NOTE — TELEPHONE ENCOUNTER
Patient was discharged from the hospital and is needing to F/U. Patient would like to speak to her RN regarding her hospital.

## 2020-01-20 NOTE — TELEPHONE ENCOUNTER
"RNCC returned call to [facility] for the update post hospital discharge and needed follow-up. Pt states that she was admitted with a UTI and pyelonephritis. She states she was very worried about her kidney but now her creatinine is 1.7. Asked if transplant office had received the hospital paperwork. Through [employer] this writer was able to pull up the admission/discharge summary by Dr. Lino Ling and labs through [facility] to review.     -Pt was hospitalized from 1/13/20-1/19/20 below is quoted from the Providers notes:  _____________________________________  Per Dr. Ling:   -No fever, chills, or night sweats.Patient had acute kidney injury on admission, secondary to dehydration. Baseline creatinine is around 1.2. It was 2.08 on admission. Creatinine 1.16 on 1/19/20.     -Discussed with Dr. Nevarez, nephrology, also with Shabnam Li and Tez, nephrology. General assessment was that the acute kidney injury was secondary to dehydration from the diarrhea. There was no relationship to the transplant issues causing any of the kidney issues. Dr. Nevarez did reduce her Prograf to 1 g b.i.d. Further plans per Nephrology, Dr. Nevarez, et al outpatient.     -Had a UTI on admit. Urine culture ultimately did grow E coli with sensitivities. Given ceftriaxone.    -She did not have sxs that suggested Pyelo- but rather it seems that because of the transplant status- it was felt as though she should be treated as 'pyelo'.     -She also was not having SXs suggestive of UTI    -she will call the transplant office tomorrow to coordinate duration and choice of ABX if so desired  _____________________________________  [facility] would like to see Dr. Levi, or a nephrologist at the  to re-establish care, as hospital discharge follow up because she believes her Primary Dr. Lopes and Local Nephrologist, Dr. Nevarez at [facility] would both tell her to see Transplant. She states that \"Dr. Nevarez doesn't know anything and he thinks a " "creatinine of 1.8 was okay.\" Offered her first available nephrology appt in mid-March with nephrologist MD or an appointment with SARINA sooner. Awa was adamant about seeing a nephrology doctor. Apologized that I could not get her in sooner to see nephrologist but that her primary care team could order her a repeat UA/UC to see if her UTI had cleared, if antibiotics were still needed, and that she should see them anyway post hospital discharge. Explained that because the nephrologist's had not seen her in over a year I could not enter orders and that typically for UTI's transplant doctors send patient to their primary doctors or if multiple UTIs to urology.    Awa became frustrated that she was referred to her primary doctor for hospital follow up, stated that this writer is confused and exclaimed \"You must not have a medical degree! It is not the UTI I am  concerned about, it's the thing with the \"P\" that I have...a kidney infection.\" Went on to [facility] and make assumptions, \"If I was a patient who came to the ER, you mean to tell me I wouldn't be seen by a nephrologist!\" This writer attempted to explain that if the ED MD thought a referral for the nephrologist was appropriate that she would then be seen. Awa continues to go on about her coordinator, \"Morelia\" in the past \"would have had her in to see a nephrologist tomorrow at 8AM and no wonder all the patients go to [facility].\" Exclaims that she will talk to \"Candelaria\" tomorrow and hung up.     "

## 2020-01-24 ENCOUNTER — TELEPHONE (OUTPATIENT)
Dept: TRANSPLANT | Facility: CLINIC | Age: 55
End: 2020-01-24

## 2020-01-30 ENCOUNTER — OFFICE VISIT (OUTPATIENT)
Dept: NEPHROLOGY | Facility: CLINIC | Age: 55
End: 2020-01-30
Attending: INTERNAL MEDICINE
Payer: COMMERCIAL

## 2020-01-30 VITALS
SYSTOLIC BLOOD PRESSURE: 126 MMHG | DIASTOLIC BLOOD PRESSURE: 79 MMHG | WEIGHT: 190.4 LBS | HEART RATE: 73 BPM | HEIGHT: 65 IN | BODY MASS INDEX: 31.72 KG/M2 | OXYGEN SATURATION: 99 %

## 2020-01-30 DIAGNOSIS — Z94.0 HTN, KIDNEY TRANSPLANT RELATED: ICD-10-CM

## 2020-01-30 DIAGNOSIS — R19.7 DIARRHEA, UNSPECIFIED TYPE: ICD-10-CM

## 2020-01-30 DIAGNOSIS — Z94.0 KIDNEY REPLACED BY TRANSPLANT: ICD-10-CM

## 2020-01-30 DIAGNOSIS — Z48.298 AFTERCARE FOLLOWING ORGAN TRANSPLANT: Primary | ICD-10-CM

## 2020-01-30 DIAGNOSIS — I15.1 HTN, KIDNEY TRANSPLANT RELATED: ICD-10-CM

## 2020-01-30 DIAGNOSIS — E78.5 DYSLIPIDEMIA: ICD-10-CM

## 2020-01-30 DIAGNOSIS — D84.9 IMMUNOSUPPRESSED STATUS (H): ICD-10-CM

## 2020-01-30 DIAGNOSIS — D84.9 IMMUNOSUPPRESSION (H): ICD-10-CM

## 2020-01-30 DIAGNOSIS — E11.9 TYPE 2 DIABETES, HBA1C GOAL < 7% (H): ICD-10-CM

## 2020-01-30 DIAGNOSIS — E55.9 VITAMIN D DEFICIENCY: ICD-10-CM

## 2020-01-30 PROCEDURE — G0463 HOSPITAL OUTPT CLINIC VISIT: HCPCS | Mod: ZF

## 2020-01-30 RX ORDER — SEMAGLUTIDE 1.34 MG/ML
0.5 INJECTION, SOLUTION SUBCUTANEOUS
COMMUNITY
Start: 2020-01-21

## 2020-01-30 RX ORDER — GEMFIBROZIL 600 MG/1
600 TABLET, FILM COATED ORAL
COMMUNITY
Start: 2019-08-28 | End: 2021-03-11

## 2020-01-30 RX ORDER — AZATHIOPRINE 50 MG/1
150 TABLET ORAL DAILY
Qty: 120 TABLET | Refills: 3 | Status: SHIPPED | OUTPATIENT
Start: 2020-01-30 | End: 2020-02-03

## 2020-01-30 ASSESSMENT — MIFFLIN-ST. JEOR: SCORE: 1451.59

## 2020-01-30 NOTE — LETTER
1/30/2020      RE: Awa Alexander  [address] [facility] [address]       CHRONIC TRANSPLANT NEPHROLOGY VISIT    Assessment & Plan   # LDKT: Stable   - Baseline Cr ~ 1.2-1.4 in 2019   - Proteinuria: Minimal (0.2-0.5 grams)   - Date DSA Last Checked: Jun/2017      Latest DSA: No   - BK Viremia: No   - Kidney Tx Biopsy: Sep 22, 2001; Result: No rejection. There are no glomerular, vascular or tubulointerstitial abnormalities    Stable creatinine at 1.16mg/dL at the time of hospital discharge on 1/19/20. She has a zero DR mismatch, and no DSA at the time of transplant. She was admitted for volume depletion, which was likely the culprit for her Cr rise to 2.08mg/dL. She may have also had a supratherapeutic tacrolimus level at that time which may have contributed as well.      # Immunosuppression: Tacrolimus immediate release (goal 4-6) and Mycophenolate mofetil (goal 1.0-3.5)   - Changes: No. She is taking 1mg BID. She was previously on 1.5mg BID. She still has diarrhea 5-6 times daily. Her last tac level was 6.3 on 1/15/20. Her diarrhea continues and we will plan to stop her cellcept, and start imuran 150 mg daily (~2mg/kg).    # Infection Prophylaxis:   - PJP: Sulfa/TMP (Bactrim)    # Hypertension: Controlled, but low at times;  Goal BP: > 100, but < 130 systolic   - Changes: No, she has chronic low BP but denies lightheadedness or dizziness.    # Diabetes: Borderline control (HbA1c 7-9%) Last HbA1c: 7.9%   - Management as per primary care. She is on 25U determir at bedtime + SSI    # Anemia in Chronic Renal Disease: Hgb: Stable (13.6g/dL)     CHANCE: No   - Iron studies: Replete    # Mineral Bone Disorder:   - Vitamin D; level: Not checked recently, but was low last check        On Supplement: Yes  - Calcium; level: Normal        On Supplement: No    Reduce her vitamin D to 1000U daily. Get vitamin D level.    # Electrolytes:   - Potassium; level: Normal        On Supplement: No  - Bicarbonate; level: Low        On  Supplement: No    # Diarrhea: Patient reports 6-7 episodes daily. She reports watery diarrhea. She tries to replace her volume losses. She reports heartburn and chest pressure. She had an EGD in 2019 and the results were normal.     # Aneurysm Risk in PKD: Had MRA in 2006 that was normal    # Skin Cancer Risk:    - Discussed sun protection and recommend regular follow up with Dermatology.    # Medical Compliance: Yes    # Transplant History:  Etiology of Kidney Failure: Polycystic kidney disease (PKD)  Tx: LDKT  Transplant: 7/22/2011 (Kidney)  Donor Type: Living Donor Class: Standard Criteria Donor  Significant changes in immunosuppression: None  Significant transplant-related complications: None    Transplant Office Phone Number: [phone]    Assessment and plan was discussed with the patient and she voiced her understanding and agreement.    Return visit: Return in about 3 months (around 4/30/2020).     Attestation:  This patient has been seen and evaluated by me, Lino Levi MD.  I have reviewed the note and agree with plan of care as documented by the fellow.       Chief Complaint   Ms. Alexander is a 54 year old here for routine follow up.    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 She was last seen in clinic by Dr. Enriquez on 9/27/16; please see that note for further details.     She was admitted to the ED with diarrhea x 1 week and spent 01/13-01/19. She was started on Ancef but had an allergic reaction, and was transitioned to ceftriaxone. She was not discharged with antibiotics.    She reports feeling otherwise well and has no fevers or chills. She reports no chest pain and no shortness of breath. She has diarrhea, and no abdominal pain. She has no leg swelling. She has no dysuria. She denies any hematuria.       Recent Hospitalizations:  [] No [x] Yes Volume depletion, diarrhea. 01/13-01/19,  "UTI   New Medical Issues: [] No [x] Yes UTI (ecoli) on ceftriaxone.   Decreased energy: [x] No [] Yes    Chest pain or SOB with exertion:  [x] No [] Yes    Appetite change or weight change: [x] No [] Yes    Nausea, vomiting or diarrhea:  [] No [x] Yes Diarrhea 5-6 episodes daily.   Fever, sweats or chills: [x] No [] Yes    Leg swelling: [x] No [] Yes      Home BP: 106/60    Review of Systems   A comprehensive review of systems was obtained and negative, except as noted in the HPI or PMH.    Problem List   Patient Active Problem List   Diagnosis     PKD (polycystic kidney disease)     Ureteral stricture of right kidney transplant     Aftercare following organ transplant     Health Care Home     History of basal cell cancer     Immunosuppression (H)     Kidney replaced by transplant     HTN, kidney transplant related     Dyslipidemia     Type 2 diabetes, HbA1c goal < 7% (H)     Diabetes mellitus (H)     Vitamin D deficiency       Social History   Social History     Tobacco Use     Smoking status: Never Smoker     Smokeless tobacco: Never Used   Substance Use Topics     Alcohol use: No     Alcohol/week: 0.0 standard drinks     Comment: \"not anymore since the transplant\"     Drug use: No       Allergies   No Known Allergies    Medications   Current Outpatient Medications   Medication Sig     azaTHIOprine (IMURAN) 50 MG tablet Take 3 tablets (150 mg) by mouth daily     cholecalciferol (VITAMIN  -D) 1000 UNITS capsule Take 5 capsules (5,000 Units) by mouth daily     gemfibrozil (LOPID) 600 MG tablet Take 600 mg by mouth     mycophenolate (CELLCEPT - GENERIC EQUIVALENT) 250 MG capsule 750mg in the morning and 500mg in the evening     ORDER FOR DME Equipment being ordered: Gusto lancets     sulfamethoxazole-trimethoprim (BACTRIM/SEPTRA) 400-80 MG per tablet Take 1 tablet by mouth every other day     Sulfamethoxazole-Trimethoprim (SMZ-TMP DS PO)      tacrolimus (PROGRAF - GENERIC EQUIVALENT) 1 MG capsule Take 1 capsule (1 " "mg) by mouth 2 times daily     blood glucose (PHUONG CONTOUR NEXT) test strip Use to test blood sugar three times daily or as directed. (Patient not taking: Reported on 1/30/2020)     insulin detemir (LEVEMIR FLEXPEN/FLEXTOUCH) 100 UNIT/ML soln Inject 25 Units Subcutaneous At Bedtime      Insulin Lispro, Human, (HUMALOG KWIKPEN SC) 6 units per 15 grams of carbs before meals     insulin pen needle (BD PEN NEEDLE MAYRA U/F) 32G X 4 MM MISC Use with Levemir pen daily as directed. (Patient not taking: Reported on 1/30/2020)     OZEMPIC, 1 MG/DOSE, 2 MG/1.5ML pen Every monday     tacrolimus (PROGRAF - GENERIC EQUIVALENT) 0.5 MG capsule HOLD (Patient not taking: Reported on 1/30/2020)     No current facility-administered medications for this visit.      There are no discontinued medications.    Physical Exam   Vital Signs: /79   Pulse 73   Ht 1.638 m (5' 4.5\")   Wt 86.4 kg (190 lb 6.4 oz)   SpO2 99%   BMI 32.18 kg/m       GENERAL APPEARANCE: alert and no distress  HENT: mouth without ulcers or lesions  LYMPHATICS: no cervical or supraclavicular nodes  RESP: lungs clear to auscultation - no rales, rhonchi or wheezes  CV: regular rhythm, normal rate, no rub, no murmur  EDEMA: no LE edema bilaterally  ABDOMEN: soft, nondistended, nontender, bowel sounds normal  MS: extremities normal - no gross deformities noted, no evidence of inflammation in joints, no muscle tenderness  SKIN: no rash  ALLOGRAFT: Non-tender    Data     Renal Latest Ref Rng & Units 1/11/2020 10/16/2019 7/17/2019   Na 133 - 144 mmol/L - 137 140   Na (external) 136 - 145 mmol/L 138 - -   K 3.4 - 5.3 mmol/L - 4.3 3.8   K (external) 3.5 - 5.1 mmol/L 4.0 - -   Cl 94 - 109 mmol/L - 104 108   Cl (external) 98 - 109 mmol/L 112(H) - -   CO2 20 - 32 mmol/L - 22 21   CO2 (external) 20 - 29 mmol/L 15(L) - -   BUN 7 - 30 mg/dL - 26 25   BUN (external) 7 - 26 mg/dL 27(H) - -   Cr 0.52 - 1.04 mg/dL - 1.57(H) 1.19(H)   Cr (external) mg/dL 1.60(H) - -   Glucose 70 " - 99 mg/dL - 137(H) 116(H)   Glucose (external) 70 - 100 mg/dL 120(H) - -   Ca  8.5 - 10.1 mg/dL - 9.9 9.7   Ca (external) 8.4 - 10.4 mg/dL 9.7 - -   Mg 1.6 - 2.3 mg/dL - - -     Bone Health Latest Ref Rng & Units 10/12/2016 12/30/2012 12/29/2012   Phos 2.5 - 4.5 mg/dL - 4.5 3.8   Vit D Def 20 - 75 ug/L 21 - -     Heme Latest Ref Rng & Units 1/11/2020 10/16/2019 7/17/2019   WBC 4.0 - 11.0 10e9/L - 3.9(L) 6.0   WBC (external) 3.5 - 10.5 x10(9)/L 5.3 - -   Hgb 11.7 - 15.7 g/dL - 12.6 13.4   Hgb (external) 12.0 - 15.5 g/dL 13.6 - -   Plt 150 - 450 10e9/L - 296 259   Plt (external) 150 - 450 x10(9)/L 356 - -     Liver Latest Ref Rng & Units 4/22/2015 11/26/2014 6/3/2014   AP 40 - 150 U/L - 80 71   TBili 0.2 - 1.3 mg/dL - 0.3 0.6   ALT 0 - 50 U/L 26 25 23   AST 0 - 45 U/L - 20 19   Tot Protein 6.8 - 8.8 g/dL - 7.9 7.9   Albumin 3.4 - 5.0 g/dL - 3.4 4.9     Pancreas Latest Ref Rng & Units 9/16/2014 2/26/2014 11/13/2013   A1C 4.3 - 6.0 % 7.9(H) 6.7(H) 5.8        [facility] Txp Virology Latest Ref Rng & Units 6/21/2017 10/12/2016 3/12/2015   CMV IgG EU/mL - - -   CVM DNA Quant - - - -   CMV Quant <100 Copies/mL - - -   CMV QT Log <2.0 Log copies/mL - - -   BK Spec - Plasma Plasma Plasma   BK Res BKNEG copies/mL BK Virus DNA Not Detected BK Virus DNA Not Detected Test canceled by PCU/Clinic  [facility] LINK [phone] 3.12.15 KD  (A)   BK Log <2.7 Log copies/mL Not Calculated   The Real-Time quantitative BK Virus assay was developed and its performance   characteristics determined by the Infectious Diseases Diagnostic Laboratory at   the [facility] in [place], [place]. The   primers and probes for each analyte are Analyte Specific Reagents (ASRs)   manufactured by [employer].   ASRs are used in many laboratory tests necessary for standard medical care and   generally do not require U.S. Food and Drug Administration approval. The FDA   has determined that such clearance or approval is not necessary.    This test is used for clinical purposes. It should not be regarded as   investigational or for research. This laboratory is certified under the   Clinical Laboratory Improvement Amendments of 1988 (CLIA-88) as qualified to   perform high complexity clinical laboratory testing.   Not Calculated   The Real-Time quantitative BK Virus assay was developed and its performance   characteristics determined by the Infectious Diseases Diagnostic Laboratory at   the [facility] in [place], [place]. The   primers and probes for each analyte are Analyte Specific Reagents (ASRs)   manufactured by [employer].   ASRs are used in many laboratory tests necessary for standard medical care and   generally do not require U.S. Food and Drug Administration approval. The FDA   has determined that such clearance or approval is not necessary.   This test is used for clinical purposes. It should not be regarded as   investigational or for research. This laboratory is certified under the   Clinical Laboratory Improvement Amendments of 1988 (CLIA-88) as qualified to   perform high complexity clinical laboratory testing.   Test canceled by U/Clinic  [facility] LINK [phone] 3.12.15. KD     EBV IgG - - - -   Hep B Core NEG - - -   Hep B Surf - - - -   HIV 1&2 NEG - - -        Recent Labs   Lab Test 04/17/19  0713 07/17/19  0714 10/16/19  0714   DOSTAC 7 07/16/2019 AT 0700 PM LAST DOSE 7:10PM 10/15/2019   TACROL 3.0* 3.1* <3.0*     Recent Labs   Lab Test 07/11/18  0721 01/16/19  0736 04/17/19  0713   DOSMPA 07/10/2018 at 0730 pm 01/15/2019 at 0745 pm 7   MPACID 2.06 0.65* 0.82*   MPAG 27.8* 21.0* 31.6     Scribe Disclosure:  Gabriela AHN, a scribe, prepared the chart for today's encounter.     Lino Levi MD

## 2020-01-30 NOTE — PROGRESS NOTES
CHRONIC TRANSPLANT NEPHROLOGY VISIT    Assessment & Plan   # LDKT: Stable   - Baseline Cr ~ 1.2-1.4 in 2019   - Proteinuria: Minimal (0.2-0.5 grams)   - Date DSA Last Checked: Jun/2017      Latest DSA: No   - BK Viremia: No   - Kidney Tx Biopsy: Sep 22, 2001; Result: No rejection. There are no glomerular, vascular or tubulointerstitial abnormalities    Stable creatinine at 1.16mg/dL at the time of hospital discharge on 1/19/20. She has a zero DR mismatch, and no DSA at the time of transplant. She was admitted for volume depletion, which was likely the culprit for her Cr rise to 2.08mg/dL. She may have also had a supratherapeutic tacrolimus level at that time which may have contributed as well.      # Immunosuppression: Tacrolimus immediate release (goal 4-6) and Mycophenolate mofetil (goal 1.0-3.5)   - Changes: No. She is taking 1mg BID. She was previously on 1.5mg BID. She still has diarrhea 5-6 times daily. Her last tac level was 6.3 on 1/15/20. Her diarrhea continues and we will plan to stop her cellcept, and start imuran 150 mg daily (~2mg/kg).    # Infection Prophylaxis:   - PJP: Sulfa/TMP (Bactrim)    # Hypertension: Controlled, but low at times;  Goal BP: > 100, but < 130 systolic   - Changes: No, she has chronic low BP but denies lightheadedness or dizziness.    # Diabetes: Borderline control (HbA1c 7-9%) Last HbA1c: 7.9%   - Management as per primary care. She is on 25U determir at bedtime + SSI    # Anemia in Chronic Renal Disease: Hgb: Stable (13.6g/dL)     CHANCE: No   - Iron studies: Replete    # Mineral Bone Disorder:   - Vitamin D; level: Not checked recently, but was low last check        On Supplement: Yes  - Calcium; level: Normal        On Supplement: No    Reduce her vitamin D to 1000U daily. Get vitamin D level.    # Electrolytes:   - Potassium; level: Normal        On Supplement: No  - Bicarbonate; level: Low        On Supplement: No    # Diarrhea: Patient reports 6-7 episodes daily. She reports  watery diarrhea. She tries to replace her volume losses. She reports heartburn and chest pressure. She had an EGD in 2019 and the results were normal.     # Aneurysm Risk in PKD: Had MRA in 2006 that was normal    # Skin Cancer Risk:    - Discussed sun protection and recommend regular follow up with Dermatology.    # Medical Compliance: Yes    # Transplant History:  Etiology of Kidney Failure: Polycystic kidney disease (PKD)  Tx: LDKT  Transplant: 7/22/2011 (Kidney)  Donor Type: Living Donor Class: Standard Criteria Donor  Significant changes in immunosuppression: None  Significant transplant-related complications: None    Transplant Office Phone Number: [phone]    Assessment and plan was discussed with the patient and she voiced her understanding and agreement.    Return visit: Return in about 3 months (around 4/30/2020).     Attestation:  This patient has been seen and evaluated by me, Lino Levi MD.  I have reviewed the note and agree with plan of care as documented by the fellow.       Chief Complaint   Ms. Alexander is a 54 year old here for routine follow up.    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 She was last seen in clinic by Dr. Enriquez on 9/27/16; please see that note for further details.     She was admitted to the ED with diarrhea x 1 week and spent 01/13-01/19. She was started on Ancef but had an allergic reaction, and was transitioned to ceftriaxone. She was not discharged with antibiotics.    She reports feeling otherwise well and has no fevers or chills. She reports no chest pain and no shortness of breath. She has diarrhea, and no abdominal pain. She has no leg swelling. She has no dysuria. She denies any hematuria.       Recent Hospitalizations:  [] No [x] Yes Volume depletion, diarrhea. 01/13-01/19, UTI   New Medical Issues: [] No [x] Yes UTI (ecoli) on ceftriaxone.   Decreased  "energy: [x] No [] Yes    Chest pain or SOB with exertion:  [x] No [] Yes    Appetite change or weight change: [x] No [] Yes    Nausea, vomiting or diarrhea:  [] No [x] Yes Diarrhea 5-6 episodes daily.   Fever, sweats or chills: [x] No [] Yes    Leg swelling: [x] No [] Yes      Home BP: 106/60    Review of Systems   A comprehensive review of systems was obtained and negative, except as noted in the HPI or PMH.    Problem List   Patient Active Problem List   Diagnosis     PKD (polycystic kidney disease)     Ureteral stricture of right kidney transplant     Aftercare following organ transplant     Health Care Home     History of basal cell cancer     Immunosuppression (H)     Kidney replaced by transplant     HTN, kidney transplant related     Dyslipidemia     Type 2 diabetes, HbA1c goal < 7% (H)     Diabetes mellitus (H)     Vitamin D deficiency       Social History   Social History     Tobacco Use     Smoking status: Never Smoker     Smokeless tobacco: Never Used   Substance Use Topics     Alcohol use: No     Alcohol/week: 0.0 standard drinks     Comment: \"not anymore since the transplant\"     Drug use: No       Allergies   No Known Allergies    Medications   Current Outpatient Medications   Medication Sig     azaTHIOprine (IMURAN) 50 MG tablet Take 3 tablets (150 mg) by mouth daily     cholecalciferol (VITAMIN  -D) 1000 UNITS capsule Take 5 capsules (5,000 Units) by mouth daily     gemfibrozil (LOPID) 600 MG tablet Take 600 mg by mouth     mycophenolate (CELLCEPT - GENERIC EQUIVALENT) 250 MG capsule 750mg in the morning and 500mg in the evening     ORDER FOR DME Equipment being ordered: geronimo lancets     sulfamethoxazole-trimethoprim (BACTRIM/SEPTRA) 400-80 MG per tablet Take 1 tablet by mouth every other day     Sulfamethoxazole-Trimethoprim (SMZ-TMP DS PO)      tacrolimus (PROGRAF - GENERIC EQUIVALENT) 1 MG capsule Take 1 capsule (1 mg) by mouth 2 times daily     blood glucose (GERONIMO CONTOUR NEXT) test strip Use " "to test blood sugar three times daily or as directed. (Patient not taking: Reported on 1/30/2020)     insulin detemir (LEVEMIR FLEXPEN/FLEXTOUCH) 100 UNIT/ML soln Inject 25 Units Subcutaneous At Bedtime      Insulin Lispro, Human, (HUMALOG KWIKPEN SC) 6 units per 15 grams of carbs before meals     insulin pen needle (BD PEN NEEDLE MAYRA U/F) 32G X 4 MM MISC Use with Levemir pen daily as directed. (Patient not taking: Reported on 1/30/2020)     OZEMPIC, 1 MG/DOSE, 2 MG/1.5ML pen Every monday     tacrolimus (PROGRAF - GENERIC EQUIVALENT) 0.5 MG capsule HOLD (Patient not taking: Reported on 1/30/2020)     No current facility-administered medications for this visit.      There are no discontinued medications.    Physical Exam   Vital Signs: /79   Pulse 73   Ht 1.638 m (5' 4.5\")   Wt 86.4 kg (190 lb 6.4 oz)   SpO2 99%   BMI 32.18 kg/m      GENERAL APPEARANCE: alert and no distress  HENT: mouth without ulcers or lesions  LYMPHATICS: no cervical or supraclavicular nodes  RESP: lungs clear to auscultation - no rales, rhonchi or wheezes  CV: regular rhythm, normal rate, no rub, no murmur  EDEMA: no LE edema bilaterally  ABDOMEN: soft, nondistended, nontender, bowel sounds normal  MS: extremities normal - no gross deformities noted, no evidence of inflammation in joints, no muscle tenderness  SKIN: no rash  ALLOGRAFT: Non-tender    Data     Renal Latest Ref Rng & Units 1/11/2020 10/16/2019 7/17/2019   Na 133 - 144 mmol/L - 137 140   Na (external) 136 - 145 mmol/L 138 - -   K 3.4 - 5.3 mmol/L - 4.3 3.8   K (external) 3.5 - 5.1 mmol/L 4.0 - -   Cl 94 - 109 mmol/L - 104 108   Cl (external) 98 - 109 mmol/L 112(H) - -   CO2 20 - 32 mmol/L - 22 21   CO2 (external) 20 - 29 mmol/L 15(L) - -   BUN 7 - 30 mg/dL - 26 25   BUN (external) 7 - 26 mg/dL 27(H) - -   Cr 0.52 - 1.04 mg/dL - 1.57(H) 1.19(H)   Cr (external) mg/dL 1.60(H) - -   Glucose 70 - 99 mg/dL - 137(H) 116(H)   Glucose (external) 70 - 100 mg/dL 120(H) - -   Ca  " 8.5 - 10.1 mg/dL - 9.9 9.7   Ca (external) 8.4 - 10.4 mg/dL 9.7 - -   Mg 1.6 - 2.3 mg/dL - - -     Bone Health Latest Ref Rng & Units 10/12/2016 12/30/2012 12/29/2012   Phos 2.5 - 4.5 mg/dL - 4.5 3.8   Vit D Def 20 - 75 ug/L 21 - -     Heme Latest Ref Rng & Units 1/11/2020 10/16/2019 7/17/2019   WBC 4.0 - 11.0 10e9/L - 3.9(L) 6.0   WBC (external) 3.5 - 10.5 x10(9)/L 5.3 - -   Hgb 11.7 - 15.7 g/dL - 12.6 13.4   Hgb (external) 12.0 - 15.5 g/dL 13.6 - -   Plt 150 - 450 10e9/L - 296 259   Plt (external) 150 - 450 x10(9)/L 356 - -     Liver Latest Ref Rng & Units 4/22/2015 11/26/2014 6/3/2014   AP 40 - 150 U/L - 80 71   TBili 0.2 - 1.3 mg/dL - 0.3 0.6   ALT 0 - 50 U/L 26 25 23   AST 0 - 45 U/L - 20 19   Tot Protein 6.8 - 8.8 g/dL - 7.9 7.9   Albumin 3.4 - 5.0 g/dL - 3.4 4.9     Pancreas Latest Ref Rng & Units 9/16/2014 2/26/2014 11/13/2013   A1C 4.3 - 6.0 % 7.9(H) 6.7(H) 5.8        [facility] Txp Virology Latest Ref Rng & Units 6/21/2017 10/12/2016 3/12/2015   CMV IgG EU/mL - - -   CVM DNA Quant - - - -   CMV Quant <100 Copies/mL - - -   CMV QT Log <2.0 Log copies/mL - - -   BK Spec - Plasma Plasma Plasma   BK Res BKNEG copies/mL BK Virus DNA Not Detected BK Virus DNA Not Detected Test canceled by U/Clinic  [facility] LINK [phone] 3.12.15 KD  (A)   BK Log <2.7 Log copies/mL Not Calculated   The Real-Time quantitative BK Virus assay was developed and its performance   characteristics determined by the Infectious Diseases Diagnostic Laboratory at   the [facility] in [place], [place]. The   primers and probes for each analyte are Analyte Specific Reagents (ASRs)   manufactured by [employer].   ASRs are used in many laboratory tests necessary for standard medical care and   generally do not require U.S. Food and Drug Administration approval. The FDA   has determined that such clearance or approval is not necessary.   This test is used for clinical purposes. It should not be regarded as    investigational or for research. This laboratory is certified under the   Clinical Laboratory Improvement Amendments of 1988 (CLIA-88) as qualified to   perform high complexity clinical laboratory testing.   Not Calculated   The Real-Time quantitative BK Virus assay was developed and its performance   characteristics determined by the Infectious Diseases Diagnostic Laboratory at   the [facility] in [place], [place]. The   primers and probes for each analyte are Analyte Specific Reagents (ASRs)   manufactured by [employer].   ASRs are used in many laboratory tests necessary for standard medical care and   generally do not require U.S. Food and Drug Administration approval. The FDA   has determined that such clearance or approval is not necessary.   This test is used for clinical purposes. It should not be regarded as   investigational or for research. This laboratory is certified under the   Clinical Laboratory Improvement Amendments of 1988 (CLIA-88) as qualified to   perform high complexity clinical laboratory testing.   Test canceled by [facility]/Clinic  [facility] LINK [phone] 3.12.15. KD     EBV IgG - - - -   Hep B Core NEG - - -   Hep B Surf - - - -   HIV 1&2 NEG - - -        Recent Labs   Lab Test 04/17/19  0713 07/17/19  0714 10/16/19  0714   DOSTAC 7 07/16/2019 AT 0700 PM LAST DOSE 7:10PM 10/15/2019   TACROL 3.0* 3.1* <3.0*     Recent Labs   Lab Test 07/11/18  0721 01/16/19  0736 04/17/19  0713   DOSMPA 07/10/2018 at 0730 pm 01/15/2019 at 0745 pm 7   MPACID 2.06 0.65* 0.82*   MPAG 27.8* 21.0* 31.6     Scribe Disclosure:  Gabriela AHN, a scribe, prepared the chart for today's encounter.

## 2020-01-30 NOTE — LETTER
1/30/2020       RE: Awa Alexander  [id number] [facility] [id number]     Dear Colleague,    Thank you for referring your patient, Awa Alexander, to the [facility] NEPHROLOGY at [facility]. Please see a copy of my visit note below.    CHRONIC TRANSPLANT NEPHROLOGY VISIT    Assessment & Plan   # LDKT: Stable   - Baseline Cr ~ 1.2-1.4 in 2019   - Proteinuria: Minimal (0.2-0.5 grams)   - Date DSA Last Checked: Jun/2017      Latest DSA: No   - BK Viremia: No   - Kidney Tx Biopsy: Sep 22, 2001; Result: No rejection. There are no glomerular, vascular or tubulointerstitial abnormalities    Stable creatinine at 1.16mg/dL at the time of hospital discharge on 1/19/20. She has a zero DR mismatch, and no DSA at the time of transplant. She was admitted for volume depletion, which was likely the culprit for her Cr rise to 2.08mg/dL. She may have also had a supratherapeutic tacrolimus level at that time which may have contributed as well.      # Immunosuppression: Tacrolimus immediate release (goal 4-6) and Mycophenolate mofetil (goal 1.0-3.5)   - Changes: No. She is taking 1mg BID. She was previously on 1.5mg BID. She still has diarrhea 5-6 times daily. Her last tac level was 6.3 on 1/15/20. Her diarrhea continues and we will plan to stop her cellcept, and start imuran 150 mg daily (~2mg/kg).    # Infection Prophylaxis:   - PJP: Sulfa/TMP (Bactrim)    # Hypertension: Controlled, but low at times;  Goal BP: > 100, but < 130 systolic   - Changes: No, she has chronic low BP but denies lightheadedness or dizziness.    # Diabetes: Borderline control (HbA1c 7-9%) Last HbA1c: 7.9%   - Management as per primary care. She is on 25U determir at bedtime + SSI    # Anemia in Chronic Renal Disease: Hgb: Stable (13.6g/dL)     CHANCE: No   - Iron studies: Replete    # Mineral Bone Disorder:   - Vitamin D; level: Not checked recently, but was low last check        On Supplement: Yes  - Calcium; level: Normal         On Supplement: No    Reduce her vitamin D to 1000U daily. Get vitamin D level.    # Electrolytes:   - Potassium; level: Normal        On Supplement: No  - Bicarbonate; level: Low        On Supplement: No    # Diarrhea: Patient reports 6-7 episodes daily. She reports watery diarrhea. She tries to replace her volume losses. She reports heartburn and chest pressure. She had an EGD in 2019 and the results were normal.     # Aneurysm Risk in PKD: Had MRA in 2006 that was normal    # Skin Cancer Risk:    - Discussed sun protection and recommend regular follow up with Dermatology.    # Medical Compliance: Yes    # Transplant History:  Etiology of Kidney Failure: Polycystic kidney disease (PKD)  Tx: LDKT  Transplant: 7/22/2011 (Kidney)  Donor Type: Living Donor Class: Standard Criteria Donor  Significant changes in immunosuppression: None  Significant transplant-related complications: None    Transplant Office Phone Number: [phone]    Assessment and plan was discussed with the patient and she voiced her understanding and agreement.    Return visit: Return in about 3 months (around 4/30/2020).     Attestation:  This patient has been seen and evaluated by me, Lino Levi MD.  I have reviewed the note and agree with plan of care as documented by the fellow.       Chief Complaint   Ms. Alexander is a 54 year old here for routine follow up.    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 She was last seen in clinic by Dr. Enriquez on 9/27/16; please see that note for further details.     She was admitted to the ED with diarrhea x 1 week and spent 01/13-01/19. She was started on Ancef but had an allergic reaction, and was transitioned to ceftriaxone. She was not discharged with antibiotics.    She reports feeling otherwise well and has no fevers or chills. She reports no chest pain and no shortness of breath. She  "has diarrhea, and no abdominal pain. She has no leg swelling. She has no dysuria. She denies any hematuria.       Recent Hospitalizations:  [] No [x] Yes Volume depletion, diarrhea. 01/13-01/19, UTI   New Medical Issues: [] No [x] Yes UTI (ecoli) on ceftriaxone.   Decreased energy: [x] No [] Yes    Chest pain or SOB with exertion:  [x] No [] Yes    Appetite change or weight change: [x] No [] Yes    Nausea, vomiting or diarrhea:  [] No [x] Yes Diarrhea 5-6 episodes daily.   Fever, sweats or chills: [x] No [] Yes    Leg swelling: [x] No [] Yes      Home BP: 106/60    Review of Systems   A comprehensive review of systems was obtained and negative, except as noted in the HPI or PMH.    Problem List   Patient Active Problem List   Diagnosis     PKD (polycystic kidney disease)     Ureteral stricture of right kidney transplant     Aftercare following organ transplant     Health Care Home     History of basal cell cancer     Immunosuppression (H)     Kidney replaced by transplant     HTN, kidney transplant related     Dyslipidemia     Type 2 diabetes, HbA1c goal < 7% (H)     Diabetes mellitus (H)     Vitamin D deficiency       Social History   Social History     Tobacco Use     Smoking status: Never Smoker     Smokeless tobacco: Never Used   Substance Use Topics     Alcohol use: No     Alcohol/week: 0.0 standard drinks     Comment: \"not anymore since the transplant\"     Drug use: No       Allergies   No Known Allergies    Medications   Current Outpatient Medications   Medication Sig     azaTHIOprine (IMURAN) 50 MG tablet Take 3 tablets (150 mg) by mouth daily     cholecalciferol (VITAMIN  -D) 1000 UNITS capsule Take 5 capsules (5,000 Units) by mouth daily     gemfibrozil (LOPID) 600 MG tablet Take 600 mg by mouth     mycophenolate (CELLCEPT - GENERIC EQUIVALENT) 250 MG capsule 750mg in the morning and 500mg in the evening     ORDER FOR DME Equipment being ordered: [employer] lancets     sulfamethoxazole-trimethoprim " "(BACTRIM/SEPTRA) 400-80 MG per tablet Take 1 tablet by mouth every other day     Sulfamethoxazole-Trimethoprim (SMZ-TMP DS PO)      tacrolimus (PROGRAF - GENERIC EQUIVALENT) 1 MG capsule Take 1 capsule (1 mg) by mouth 2 times daily     blood glucose (PHUONG CONTOUR NEXT) test strip Use to test blood sugar three times daily or as directed. (Patient not taking: Reported on 1/30/2020)     insulin detemir (LEVEMIR FLEXPEN/FLEXTOUCH) 100 UNIT/ML soln Inject 25 Units Subcutaneous At Bedtime      Insulin Lispro, Human, (HUMALOG KWIKPEN SC) 6 units per 15 grams of carbs before meals     insulin pen needle (BD PEN NEEDLE MAYRA U/F) 32G X 4 MM MISC Use with Levemir pen daily as directed. (Patient not taking: Reported on 1/30/2020)     OZEMPIC, 1 MG/DOSE, 2 MG/1.5ML pen Every monday     tacrolimus (PROGRAF - GENERIC EQUIVALENT) 0.5 MG capsule HOLD (Patient not taking: Reported on 1/30/2020)     No current facility-administered medications for this visit.      There are no discontinued medications.    Physical Exam   Vital Signs: /79   Pulse 73   Ht 1.638 m (5' 4.5\")   Wt 86.4 kg (190 lb 6.4 oz)   SpO2 99%   BMI 32.18 kg/m       GENERAL APPEARANCE: alert and no distress  HENT: mouth without ulcers or lesions  LYMPHATICS: no cervical or supraclavicular nodes  RESP: lungs clear to auscultation - no rales, rhonchi or wheezes  CV: regular rhythm, normal rate, no rub, no murmur  EDEMA: no LE edema bilaterally  ABDOMEN: soft, nondistended, nontender, bowel sounds normal  MS: extremities normal - no gross deformities noted, no evidence of inflammation in joints, no muscle tenderness  SKIN: no rash  ALLOGRAFT: Non-tender    Data     Renal Latest Ref Rng & Units 1/11/2020 10/16/2019 7/17/2019   Na 133 - 144 mmol/L - 137 140   Na (external) 136 - 145 mmol/L 138 - -   K 3.4 - 5.3 mmol/L - 4.3 3.8   K (external) 3.5 - 5.1 mmol/L 4.0 - -   Cl 94 - 109 mmol/L - 104 108   Cl (external) 98 - 109 mmol/L 112(H) - -   CO2 20 - 32 mmol/L " - 22 21   CO2 (external) 20 - 29 mmol/L 15(L) - -   BUN 7 - 30 mg/dL - 26 25   BUN (external) 7 - 26 mg/dL 27(H) - -   Cr 0.52 - 1.04 mg/dL - 1.57(H) 1.19(H)   Cr (external) mg/dL 1.60(H) - -   Glucose 70 - 99 mg/dL - 137(H) 116(H)   Glucose (external) 70 - 100 mg/dL 120(H) - -   Ca  8.5 - 10.1 mg/dL - 9.9 9.7   Ca (external) 8.4 - 10.4 mg/dL 9.7 - -   Mg 1.6 - 2.3 mg/dL - - -     Bone Health Latest Ref Rng & Units 10/12/2016 12/30/2012 12/29/2012   Phos 2.5 - 4.5 mg/dL - 4.5 3.8   Vit D Def 20 - 75 ug/L 21 - -     Heme Latest Ref Rng & Units 1/11/2020 10/16/2019 7/17/2019   WBC 4.0 - 11.0 10e9/L - 3.9(L) 6.0   WBC (external) 3.5 - 10.5 x10(9)/L 5.3 - -   Hgb 11.7 - 15.7 g/dL - 12.6 13.4   Hgb (external) 12.0 - 15.5 g/dL 13.6 - -   Plt 150 - 450 10e9/L - 296 259   Plt (external) 150 - 450 x10(9)/L 356 - -     Liver Latest Ref Rng & Units 4/22/2015 11/26/2014 6/3/2014   AP 40 - 150 U/L - 80 71   TBili 0.2 - 1.3 mg/dL - 0.3 0.6   ALT 0 - 50 U/L 26 25 23   AST 0 - 45 U/L - 20 19   Tot Protein 6.8 - 8.8 g/dL - 7.9 7.9   Albumin 3.4 - 5.0 g/dL - 3.4 4.9     Pancreas Latest Ref Rng & Units 9/16/2014 2/26/2014 11/13/2013   A1C 4.3 - 6.0 % 7.9(H) 6.7(H) 5.8        [facility] Txp Virology Latest Ref Rng & Units 6/21/2017 10/12/2016 3/12/2015   CMV IgG EU/mL - - -   CVM DNA Quant - - - -   CMV Quant <100 Copies/mL - - -   CMV QT Log <2.0 Log copies/mL - - -   BK Spec - Plasma Plasma Plasma   BK Res BKNEG copies/mL BK Virus DNA Not Detected BK Virus DNA Not Detected Test canceled by [facility]/Clinic  [facility] LINK [phone] 3.12.15 KD  (A)   BK Log <2.7 Log copies/mL Not Calculated   The Real-Time quantitative BK Virus assay was developed and its performance   characteristics determined by the Infectious Diseases Diagnostic Laboratory at   the [facility] in [place], [place]. The   primers and probes for each analyte are Analyte Specific Reagents (ASRs)   manufactured by [employer].   ASRs are used in many  laboratory tests necessary for standard medical care and   generally do not require U.S. Food and Drug Administration approval. The FDA   has determined that such clearance or approval is not necessary.   This test is used for clinical purposes. It should not be regarded as   investigational or for research. This laboratory is certified under the   Clinical Laboratory Improvement Amendments of 1988 (CLIA-88) as qualified to   perform high complexity clinical laboratory testing.   Not Calculated   The Real-Time quantitative BK Virus assay was developed and its performance   characteristics determined by the Infectious Diseases Diagnostic Laboratory at   the [facility] in [place], [place]. The   primers and probes for each analyte are Analyte Specific Reagents (ASRs)   manufactured by [employer].   ASRs are used in many laboratory tests necessary for standard medical care and   generally do not require U.S. Food and Drug Administration approval. The FDA   has determined that such clearance or approval is not necessary.   This test is used for clinical purposes. It should not be regarded as   investigational or for research. This laboratory is certified under the   Clinical Laboratory Improvement Amendments of 1988 (CLIA-88) as qualified to   perform high complexity clinical laboratory testing.   Test canceled by U/Clinic  [facility] LINK [phone] 3.12.15. KD     EBV IgG - - - -   Hep B Core NEG - - -   Hep B Surf - - - -   HIV 1&2 NEG - - -        Recent Labs   Lab Test 04/17/19  0713 07/17/19  0714 10/16/19  0714   DOSTAC 7 07/16/2019 AT 0700 PM LAST DOSE 7:10PM 10/15/2019   TACROL 3.0* 3.1* <3.0*     Recent Labs   Lab Test 07/11/18  0721 01/16/19  0736 04/17/19  0713   DOSMPA 07/10/2018 at 0730 pm 01/15/2019 at 0745 pm 7   MPACID 2.06 0.65* 0.82*   MPAG 27.8* 21.0* 31.6     Scribe Disclosure:  I, Gabriela Tomlin, pia scribe, prepared the chart for today's encounter.       Again, thank  you for allowing me to participate in the care of your patient.      Sincerely,    Kidney/Pancreas Recipient

## 2020-01-30 NOTE — PATIENT INSTRUCTIONS
1. Please get your lab work done whenever feasible    2. Please check your dose of vitamin D. We recommend 1000U daily.     3. Stop cellcept, start imuran 150mg daily

## 2020-01-31 DIAGNOSIS — Z94.0 KIDNEY REPLACED BY TRANSPLANT: Primary | ICD-10-CM

## 2020-01-31 DIAGNOSIS — Z48.298 AFTERCARE FOLLOWING ORGAN TRANSPLANT: ICD-10-CM

## 2020-01-31 DIAGNOSIS — Z79.899 ENCOUNTER FOR LONG-TERM CURRENT USE OF MEDICATION: ICD-10-CM

## 2020-02-02 PROBLEM — E55.9 VITAMIN D DEFICIENCY: Status: ACTIVE | Noted: 2020-02-02

## 2020-02-03 ENCOUNTER — TELEPHONE (OUTPATIENT)
Dept: TRANSPLANT | Facility: CLINIC | Age: 55
End: 2020-02-03

## 2020-02-03 DIAGNOSIS — D84.9 IMMUNOSUPPRESSED STATUS (H): ICD-10-CM

## 2020-02-03 RX ORDER — AZATHIOPRINE 50 MG/1
150 TABLET ORAL DAILY
Qty: 120 TABLET | Refills: 3 | Status: SHIPPED | OUTPATIENT
Start: 2020-02-03 | End: 2020-03-20

## 2020-02-03 NOTE — TELEPHONE ENCOUNTER
Patient Call: General  Route to LPN    Reason for call: Per Pt needs Imuran called in  They did not receive the Rx from 1/30/2020  Needs called into [facility]       Call back needed? No

## 2020-02-05 DIAGNOSIS — E55.9 VITAMIN D DEFICIENCY: ICD-10-CM

## 2020-02-05 DIAGNOSIS — Z48.298 AFTERCARE FOLLOWING ORGAN TRANSPLANT: ICD-10-CM

## 2020-02-05 LAB
ANION GAP SERPL CALCULATED.3IONS-SCNC: 9 MMOL/L (ref 3–14)
BUN SERPL-MCNC: 21 MG/DL (ref 7–30)
CALCIUM SERPL-MCNC: 9.6 MG/DL (ref 8.5–10.1)
CHLORIDE SERPL-SCNC: 111 MMOL/L (ref 94–109)
CO2 SERPL-SCNC: 20 MMOL/L (ref 20–32)
CREAT SERPL-MCNC: 1.24 MG/DL (ref 0.52–1.04)
DEPRECATED CALCIDIOL+CALCIFEROL SERPL-MC: 56 UG/L (ref 20–75)
GFR SERPL CREATININE-BSD FRML MDRD: 49 ML/MIN/{1.73_M2}
GLUCOSE SERPL-MCNC: 127 MG/DL (ref 70–99)
POTASSIUM SERPL-SCNC: 4 MMOL/L (ref 3.4–5.3)
SODIUM SERPL-SCNC: 140 MMOL/L (ref 133–144)
TACROLIMUS BLD-MCNC: 8.2 UG/L (ref 5–15)
TME LAST DOSE: NORMAL H

## 2020-02-05 PROCEDURE — 82306 VITAMIN D 25 HYDROXY: CPT | Performed by: STUDENT IN AN ORGANIZED HEALTH CARE EDUCATION/TRAINING PROGRAM

## 2020-02-05 PROCEDURE — 80048 BASIC METABOLIC PNL TOTAL CA: CPT | Performed by: STUDENT IN AN ORGANIZED HEALTH CARE EDUCATION/TRAINING PROGRAM

## 2020-02-05 PROCEDURE — 80197 ASSAY OF TACROLIMUS: CPT | Performed by: STUDENT IN AN ORGANIZED HEALTH CARE EDUCATION/TRAINING PROGRAM

## 2020-02-05 PROCEDURE — 36415 COLL VENOUS BLD VENIPUNCTURE: CPT | Performed by: STUDENT IN AN ORGANIZED HEALTH CARE EDUCATION/TRAINING PROGRAM

## 2020-02-06 ENCOUNTER — TELEPHONE (OUTPATIENT)
Dept: TRANSPLANT | Facility: CLINIC | Age: 55
End: 2020-02-06

## 2020-02-06 DIAGNOSIS — R79.89 LOW VITAMIN D LEVEL: ICD-10-CM

## 2020-02-06 DIAGNOSIS — Z94.0 S/P KIDNEY TRANSPLANT: Primary | ICD-10-CM

## 2020-02-06 RX ORDER — TACROLIMUS 0.5 MG/1
0.5 CAPSULE ORAL EVERY EVENING
Qty: 90 CAPSULE | Refills: 3 | Status: SHIPPED | OUTPATIENT
Start: 2020-02-06 | End: 2020-11-23

## 2020-02-06 RX ORDER — TACROLIMUS 1 MG/1
1 CAPSULE ORAL EVERY MORNING
Qty: 90 CAPSULE | Refills: 3 | Status: SHIPPED | OUTPATIENT
Start: 2020-02-06 | End: 2020-11-23

## 2020-02-06 NOTE — TELEPHONE ENCOUNTER
ISSUE:   Tacrolimus IR level 8.2 on 2/5/2020, goal 4-6, dose 1 mg BID.    PLAN:   Please call patient and confirm this was an accurate 12-hour trough. Verify Tacrolimus IR dose 1 mg BID. Confirm no new medications or illness. Confirm no missed doses. If accurate trough and accurate dose, decrease Tacrolimus IR dose to 1 mg in the morning and 0.5 mg in the evening. Repeat labs in 1-2 weeks.    Alexia Aragon RN, BSN  Solid Organ Transplant, Post Kidney and Pancreas  Transplant Care Coordinator  [phone] option 5    OUTCOME:   Spoke with patient, they confirm accurate trough level and current dose 1 mg BID. Patient confirmed dose change to 1/0.5 mg BID and to repeat labs in 1-2 weeks. Orders sent to preferred pharmacy for dose change and lab for repeat labs. Patient voiced understanding of plan.

## 2020-02-06 NOTE — TELEPHONE ENCOUNTER
Patient also reports that she is only taking 400 mg daily of over the counter Vitamin D supplement.  KING

## 2020-02-12 ENCOUNTER — DOCUMENTATION ONLY (OUTPATIENT)
Dept: CARE COORDINATION | Facility: CLINIC | Age: 55
End: 2020-02-12

## 2020-02-21 DIAGNOSIS — Z79.899 ENCOUNTER FOR LONG-TERM CURRENT USE OF MEDICATION: ICD-10-CM

## 2020-02-21 DIAGNOSIS — Z94.0 KIDNEY REPLACED BY TRANSPLANT: ICD-10-CM

## 2020-02-21 DIAGNOSIS — Z48.298 AFTERCARE FOLLOWING ORGAN TRANSPLANT: ICD-10-CM

## 2020-02-21 LAB
TACROLIMUS BLD-MCNC: 6.2 UG/L (ref 5–15)
TME LAST DOSE: NORMAL H

## 2020-02-21 PROCEDURE — 80197 ASSAY OF TACROLIMUS: CPT | Performed by: INTERNAL MEDICINE

## 2020-02-21 PROCEDURE — 36415 COLL VENOUS BLD VENIPUNCTURE: CPT | Performed by: INTERNAL MEDICINE

## 2020-03-04 DIAGNOSIS — Z79.899 ENCOUNTER FOR LONG-TERM CURRENT USE OF MEDICATION: ICD-10-CM

## 2020-03-04 DIAGNOSIS — Z94.0 KIDNEY REPLACED BY TRANSPLANT: ICD-10-CM

## 2020-03-04 DIAGNOSIS — Z48.298 AFTERCARE FOLLOWING ORGAN TRANSPLANT: ICD-10-CM

## 2020-03-04 LAB
ANION GAP SERPL CALCULATED.3IONS-SCNC: 6 MMOL/L (ref 3–14)
BUN SERPL-MCNC: 21 MG/DL (ref 7–30)
CALCIUM SERPL-MCNC: 9.2 MG/DL (ref 8.5–10.1)
CHLORIDE SERPL-SCNC: 112 MMOL/L (ref 94–109)
CO2 SERPL-SCNC: 20 MMOL/L (ref 20–32)
CREAT SERPL-MCNC: 1.24 MG/DL (ref 0.52–1.04)
ERYTHROCYTE [DISTWIDTH] IN BLOOD BY AUTOMATED COUNT: 14.5 % (ref 10–15)
GFR SERPL CREATININE-BSD FRML MDRD: 49 ML/MIN/{1.73_M2}
GLUCOSE SERPL-MCNC: 102 MG/DL (ref 70–99)
HCT VFR BLD AUTO: 36.7 % (ref 35–47)
HGB BLD-MCNC: 12.4 G/DL (ref 11.7–15.7)
MCH RBC QN AUTO: 29.5 PG (ref 26.5–33)
MCHC RBC AUTO-ENTMCNC: 33.8 G/DL (ref 31.5–36.5)
MCV RBC AUTO: 87 FL (ref 78–100)
PLATELET # BLD AUTO: 280 10E9/L (ref 150–450)
POTASSIUM SERPL-SCNC: 3.5 MMOL/L (ref 3.4–5.3)
RBC # BLD AUTO: 4.2 10E12/L (ref 3.8–5.2)
SODIUM SERPL-SCNC: 138 MMOL/L (ref 133–144)
TACROLIMUS BLD-MCNC: 4.8 UG/L (ref 5–15)
TME LAST DOSE: ABNORMAL H
WBC # BLD AUTO: 3.2 10E9/L (ref 4–11)

## 2020-03-04 PROCEDURE — 80197 ASSAY OF TACROLIMUS: CPT | Performed by: INTERNAL MEDICINE

## 2020-03-04 PROCEDURE — 85027 COMPLETE CBC AUTOMATED: CPT | Performed by: INTERNAL MEDICINE

## 2020-03-04 PROCEDURE — 80048 BASIC METABOLIC PNL TOTAL CA: CPT | Performed by: INTERNAL MEDICINE

## 2020-03-04 PROCEDURE — 36415 COLL VENOUS BLD VENIPUNCTURE: CPT | Performed by: INTERNAL MEDICINE

## 2020-03-09 ENCOUNTER — TELEPHONE (OUTPATIENT)
Dept: TRANSPLANT | Facility: CLINIC | Age: 55
End: 2020-03-09

## 2020-03-09 DIAGNOSIS — Z94.0 KIDNEY TRANSPLANTED: ICD-10-CM

## 2020-03-09 DIAGNOSIS — R19.7 DIARRHEA: Primary | ICD-10-CM

## 2020-03-09 NOTE — TELEPHONE ENCOUNTER
Patient Call: had IS medication change  Over a month ago is still having  Diarrhea/loose stools was wondering what she should do      Awa also has the opportunity to work from home (but she really would rather go in works for [employer] was wondering what Dr. Levi thought  Call back needed? Yes    Return Call Needed  Same as documented in contacts section  When to return call?: Same day: Route High Priority

## 2020-03-11 NOTE — TELEPHONE ENCOUNTER
ISSUE: Ongoing Diarrhea      PLAN:_____________________________________________  Ongoing diarrhea   Received: 2 days ago   Message Contents   Lino Levi MD [place], Alexia Hairston, RN    Caller: Unspecified (2 days ago,  3:44 PM)               Alexia,     Can you please call the patient.  Would recommend a 72 hr stool collection for fecal fat.  Would also refer her to GI her at the [facility].     Thanks      OUTCOME:  Spoke with patient regarding Dr. Levi's recommendations for diarrhea/loose stools. Patient agreed to GI referral and Lab test. Orders placed in Epic.

## 2020-03-15 ENCOUNTER — HEALTH MAINTENANCE LETTER (OUTPATIENT)
Age: 55
End: 2020-03-15

## 2020-03-20 ENCOUNTER — TELEPHONE (OUTPATIENT)
Dept: TRANSPLANT | Facility: CLINIC | Age: 55
End: 2020-03-20

## 2020-03-20 DIAGNOSIS — Z94.0 KIDNEY TRANSPLANTED: Primary | ICD-10-CM

## 2020-03-20 DIAGNOSIS — D84.9 IMMUNOSUPPRESSED STATUS (H): ICD-10-CM

## 2020-03-20 RX ORDER — AZATHIOPRINE 50 MG/1
150 TABLET ORAL DAILY
Qty: 270 TABLET | Refills: 3 | Status: SHIPPED | OUTPATIENT
Start: 2020-03-20 | End: 2020-12-21

## 2020-03-20 NOTE — TELEPHONE ENCOUNTER
Patient Call: Medication Refill  Route to LPN  Instruct the patient to first contact their pharmacy. If they have called their pharmacy and require further assistance, route to LPN.    Pharmacy Name: [facility]  Pharmacy Location:[place]  Name of Medication: AzaThioprine 50 mg   When will the patient be out of this medication?: Greater than 3 days (Route standard priority)  Patient canceled her appointment in April and is wanting to know if she can get a script sent in or will she have to come to clinic for her annual?

## 2020-03-20 NOTE — TELEPHONE ENCOUNTER
Patient had annual appt 1/30/20 and was changed to Imuran 150 mg daily by Dr. Levi when she stopped Mycophenolate.     Patient states she just had her 3rd monthly lab check today 3/20/20. Coordinator was told patient should do monthly labs x6 months (not 3 months which is what patient states she was told). Patient states other than diarrhea she feels pretty good. She would like to know if Dr. Levi would like to see her sooner, but she is not going out to any clinics or hospitals with the Coronavirus.

## 2020-05-18 ENCOUNTER — TELEPHONE (OUTPATIENT)
Dept: TRANSPLANT | Facility: CLINIC | Age: 55
End: 2020-05-18

## 2020-05-18 DIAGNOSIS — R19.7 DIARRHEA: ICD-10-CM

## 2020-05-18 DIAGNOSIS — Z94.0 KIDNEY TRANSPLANTED: Primary | ICD-10-CM

## 2020-05-18 NOTE — TELEPHONE ENCOUNTER
Patient Call: General  Route to LPN    Reason for call: pt gettting labs tomorrow  She is feeling some symptoms like in Jan when she was admitted  Wonders if there is any extra labs you might need and emmie mojica can get tested for antibodies for COVID 19     Call back needed? Yes    Return Call Needed  Same as documented in contacts section  When to return call?: Greater than one day: Route standard priority

## 2020-05-18 NOTE — TELEPHONE ENCOUNTER
Awa VILLANUEVA Benjamin states she has some back pain which she experienced with previous UTI. She does not have have any other s/s of UTI. Awa acknowledges pain is not over graft and unlikely symptom of UTI but would still like to check urine for infection. Will order UA/UC.    She still reports around 6 watery stools a day. Mycophenolate changed to aza in February. Fecal fat ordered by Dr. Levi this year, still needs to be checked. RNCC advised that she complete that and GORGE stool studies to rule out infection. GI referral placed and pending. She may need to follow up with GI. Order placed.     Lastly, RNCC let her know we are not ordering antibody testing for COVID-19 at this time. She v/u and also confirms she is not currently experiencing any COVID symptoms.

## 2020-05-19 DIAGNOSIS — Z79.899 ENCOUNTER FOR LONG-TERM CURRENT USE OF MEDICATION: ICD-10-CM

## 2020-05-19 DIAGNOSIS — R19.7 DIARRHEA: ICD-10-CM

## 2020-05-19 DIAGNOSIS — Z94.0 KIDNEY REPLACED BY TRANSPLANT: ICD-10-CM

## 2020-05-19 DIAGNOSIS — R82.90 NONSPECIFIC FINDING ON EXAMINATION OF URINE: Primary | ICD-10-CM

## 2020-05-19 DIAGNOSIS — Z94.0 KIDNEY TRANSPLANTED: ICD-10-CM

## 2020-05-19 DIAGNOSIS — Z48.298 AFTERCARE FOLLOWING ORGAN TRANSPLANT: ICD-10-CM

## 2020-05-19 LAB
ALBUMIN UR-MCNC: 30 MG/DL
ANION GAP SERPL CALCULATED.3IONS-SCNC: 10 MMOL/L (ref 3–14)
APPEARANCE UR: CLEAR
BACTERIA #/AREA URNS HPF: ABNORMAL /HPF
BILIRUB UR QL STRIP: NEGATIVE
BUN SERPL-MCNC: 18 MG/DL (ref 7–30)
CALCIUM SERPL-MCNC: 9.4 MG/DL (ref 8.5–10.1)
CHLORIDE SERPL-SCNC: 110 MMOL/L (ref 94–109)
CO2 SERPL-SCNC: 18 MMOL/L (ref 20–32)
COLOR UR AUTO: YELLOW
CREAT SERPL-MCNC: 1.24 MG/DL (ref 0.52–1.04)
ERYTHROCYTE [DISTWIDTH] IN BLOOD BY AUTOMATED COUNT: 16.9 % (ref 10–15)
GFR SERPL CREATININE-BSD FRML MDRD: 49 ML/MIN/{1.73_M2}
GLUCOSE SERPL-MCNC: 117 MG/DL (ref 70–99)
GLUCOSE UR STRIP-MCNC: NEGATIVE MG/DL
HCT VFR BLD AUTO: 34.4 % (ref 35–47)
HGB BLD-MCNC: 12 G/DL (ref 11.7–15.7)
HGB UR QL STRIP: NEGATIVE
KETONES UR STRIP-MCNC: NEGATIVE MG/DL
LEUKOCYTE ESTERASE UR QL STRIP: ABNORMAL
MCH RBC QN AUTO: 32.7 PG (ref 26.5–33)
MCHC RBC AUTO-ENTMCNC: 34.9 G/DL (ref 31.5–36.5)
MCV RBC AUTO: 94 FL (ref 78–100)
NITRATE UR QL: NEGATIVE
NON-SQ EPI CELLS #/AREA URNS LPF: ABNORMAL /LPF
PH UR STRIP: 6 PH (ref 5–7)
PLATELET # BLD AUTO: 280 10E9/L (ref 150–450)
POTASSIUM SERPL-SCNC: 4.4 MMOL/L (ref 3.4–5.3)
PROT UR-MCNC: 0.76 G/L
PROT/CREAT 24H UR: 0.5 G/G CR (ref 0–0.2)
RBC # BLD AUTO: 3.67 10E12/L (ref 3.8–5.2)
RBC #/AREA URNS AUTO: ABNORMAL /HPF
SODIUM SERPL-SCNC: 138 MMOL/L (ref 133–144)
SOURCE: ABNORMAL
SP GR UR STRIP: 1.02 (ref 1–1.03)
TACROLIMUS BLD-MCNC: 6.4 UG/L (ref 5–15)
TME LAST DOSE: NORMAL H
UROBILINOGEN UR STRIP-ACNC: 0.2 EU/DL (ref 0.2–1)
WBC # BLD AUTO: 3 10E9/L (ref 4–11)
WBC #/AREA URNS AUTO: ABNORMAL /HPF

## 2020-05-19 PROCEDURE — 85027 COMPLETE CBC AUTOMATED: CPT | Performed by: INTERNAL MEDICINE

## 2020-05-19 PROCEDURE — 80197 ASSAY OF TACROLIMUS: CPT | Performed by: INTERNAL MEDICINE

## 2020-05-19 PROCEDURE — 80048 BASIC METABOLIC PNL TOTAL CA: CPT | Performed by: INTERNAL MEDICINE

## 2020-05-19 PROCEDURE — 87506 IADNA-DNA/RNA PROBE TQ 6-11: CPT | Performed by: INTERNAL MEDICINE

## 2020-05-19 PROCEDURE — 81001 URINALYSIS AUTO W/SCOPE: CPT | Performed by: INTERNAL MEDICINE

## 2020-05-19 PROCEDURE — 84156 ASSAY OF PROTEIN URINE: CPT | Performed by: INTERNAL MEDICINE

## 2020-05-19 PROCEDURE — 87086 URINE CULTURE/COLONY COUNT: CPT | Performed by: INTERNAL MEDICINE

## 2020-05-19 PROCEDURE — 36415 COLL VENOUS BLD VENIPUNCTURE: CPT | Performed by: INTERNAL MEDICINE

## 2020-05-20 ENCOUNTER — DOCUMENTATION ONLY (OUTPATIENT)
Dept: TRANSPLANT | Facility: CLINIC | Age: 55
End: 2020-05-20

## 2020-05-20 LAB
BACTERIA SPEC CULT: NO GROWTH
C COLI+JEJUNI+LARI FUSA STL QL NAA+PROBE: NOT DETECTED
EC STX1 GENE STL QL NAA+PROBE: NOT DETECTED
EC STX2 GENE STL QL NAA+PROBE: NOT DETECTED
ENTERIC PATHOGEN COMMENT: NORMAL
NOROV GI+II ORF1-ORF2 JNC STL QL NAA+PR: NOT DETECTED
RVA NSP5 STL QL NAA+PROBE: NOT DETECTED
SALMONELLA SP RPOD STL QL NAA+PROBE: NOT DETECTED
SHIGELLA SP+EIEC IPAH STL QL NAA+PROBE: NOT DETECTED
SPECIMEN SOURCE: NORMAL
V CHOL+PARA RFBL+TRKH+TNAA STL QL NAA+PR: NOT DETECTED
Y ENTERO RECN STL QL NAA+PROBE: NOT DETECTED

## 2020-05-20 NOTE — PROGRESS NOTES
Notes recorded by Lino Levi MD on 5/19/2020 at 5:34 PM CDT   Stable kidney function, but possible urinary tract infection and awaiting urine culture.

## 2020-05-21 ENCOUNTER — TELEPHONE (OUTPATIENT)
Dept: TRANSPLANT | Facility: CLINIC | Age: 55
End: 2020-05-21

## 2020-05-21 NOTE — TELEPHONE ENCOUNTER
RECORDS RECEIVED FROM: N/A   DATE RECEIVED: 6/2/2020   NOTES STATUS DETAILS   OFFICE NOTE from referring provider N/A    OFFICE NOTE from other specialist Care Everywhere 1/10/2020 Telephone Encounter  1/6/2020 Office visit with Dr. Moisés Lopes ([facility])   1/6/2020 Telephone Encounter   DISCHARGE SUMMARY from hospital N/A    OPERATIVE REPORT Care Everywhere    MEDICATION LIST Internal         ENDOSCOPY  Care Everywhere EGD: 9/14/18 ([facility])    COLONOSCOPY Received/ Care Everywhere 1/16/2020 ([facility])    ERCP N/A    EUS N/A    STOOL TESTING Internal 5/19/2020   PERTINENT LABS Internal    PATHOLOGY REPORTS (RELATED) Care Everywhere 1/16/2020   IMAGING (CT, MRI, EGD) Internal US Abdomen:  1/29/15     REFERRAL INFORMATION    Date referral was placed: 6/2/2020   Date all records received: N/A   Date records were scanned into Epic: N/A   Date records were sent to Provider to review: N/A   Date and recommendation received from provider:  LETTER SENT  SCHEDULE APPOINTMENT   Date patient was contacted to schedule: 5/21/2020

## 2020-05-21 NOTE — TELEPHONE ENCOUNTER
Awa Alexander unable to complete 72 fecal fat study. Lab did not have supplies. RNCC recommend she proceed with GI referral - hopefully this will be scheduled soon and see what they recommend. Otherwise, would recommend she see if lab supplies are available with next set of labs.     Awa v/channing labs are stable. Will follow up with GI.

## 2020-05-21 NOTE — TELEPHONE ENCOUNTER
Patient Call: Transplant Lab/Orders  Awa's lab was unable to do a 72 hour test requested by Dr. Levi     Post Transplant Days: 3226      Reason for Call: Clarification; which order? 72 hours lab test requested by Dr. Levi  Callback needed? Yes    Return Call Needed  Same as documented in contacts section  When to return call?: Same day: Route High Priority

## 2020-05-21 NOTE — TELEPHONE ENCOUNTER
"GORGE stool culture negative. GI referral was previously placed in March and is \"pending.\" Will message referral schedulers again to set this up.    RNCC left detailed VM for Awa Alexander, let her know urine and stool cultures negative. She should follow up with GI. Let her know referral was placed. Message sent to referral team. Asked for call back with questions.   "

## 2020-06-02 ENCOUNTER — VIRTUAL VISIT (OUTPATIENT)
Dept: GASTROENTEROLOGY | Facility: CLINIC | Age: 55
End: 2020-06-02
Payer: COMMERCIAL

## 2020-06-02 ENCOUNTER — PATIENT OUTREACH (OUTPATIENT)
Dept: GASTROENTEROLOGY | Facility: CLINIC | Age: 55
End: 2020-06-02

## 2020-06-02 ENCOUNTER — PRE VISIT (OUTPATIENT)
Dept: GASTROENTEROLOGY | Facility: CLINIC | Age: 55
End: 2020-06-02

## 2020-06-02 DIAGNOSIS — K58.0 IRRITABLE BOWEL SYNDROME WITH DIARRHEA: Primary | ICD-10-CM

## 2020-06-02 RX ORDER — DICYCLOMINE HYDROCHLORIDE 10 MG/1
10 CAPSULE ORAL 2 TIMES DAILY
Qty: 120 CAPSULE | Refills: 1 | Status: SHIPPED | OUTPATIENT
Start: 2020-06-02 | End: 2021-03-11

## 2020-06-02 ASSESSMENT — PAIN SCALES - GENERAL: PAINLEVEL: NO PAIN (0)

## 2020-06-02 NOTE — PATIENT INSTRUCTIONS
It was a pleasure to meet you today.  Our working diagnosis at this time is irritable bowel syndrome, probably caused by a GI infection over the winter.    --Okay to use Imodium as needed, particularly for car rides or meetings  --If Imodium does not work, we can try as needed Lomotil  --Restart Metamucil, sugar-free, to increase soluble fiber.  Okay to increase to twice a day  --Trial of Bentyl 10 mg twice a day.  This is an antispasmodic for the colon.  Okay to increase to 4 times a day.  --I will discuss with Dr. Levi a tentative trial of rifaximin, and antibiotic for IBS, as well as cholestyramine.  I will also review with our pharmacist to see if these interact with any of your medications.    If you are still having diarrhea in 1 month, and Dr. Levi is okay with rifaximin, will try rifaximin.    Follow-up with me in the office in 8 weeks

## 2020-06-02 NOTE — PROGRESS NOTES
GI CLINIC VISIT    CC/REFERRING MD:  Referred Self  REASON FOR CONSULTATION: Diarrhea    ASSESSMENT/PLAN:  54 year old female with a renal transplant in 2011 with chronic diarrhea    1.  Diarrhea: Relatively acute onset over the winter, severe diarrhea complicated by acute renal insufficiency, dehydration, hospitalization and coexisting pyelonephritis.  She has had over a 50% improvement in her diarrhea since her hospitalization but continues to have symptoms of loose stools with urgency.  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  I cannot completely rule out a medication effect, but her mycophenolate was stopped which will be the most likely culprit in her diarrhea has not dramatically improved.  Other possible causes would be bile salt diarrhea, or much less likely pancreatic exocrine insufficiency.    I also note that her white blood cell count has trended to be low since October.  Previously this was in the normal range on her immunosuppressive regimen.  I am not sure if there is a correlation with this and her diarrhea, however if her diarrhea is medication induced and it may be that she is having other systemic effects of immune suppression.    At this point will manage her as irritable bowel syndrome, postinfectious.  We will trial her on fiber and antispasmodic medications.  Should this not work will try rifaximin.  Should that not work we will try cholestyramine.    I discussed with our GI pharmacist about these medications and her transplant regimen, and there are no obvious interactions.  I will also review this with her transplant nephrologist.    -- Metamucil once to twice a day  -- Trial of Bentyl 10 mg twice a day, can increase to 4 times a day as needed  -- Stool for fat, Cryptosporidium and Giardia, and pancreatic elastase  -- If still having significant diarrhea and urgency in 1 month, will proceed with trial of Xifaxan  --  If does not respond to Xifaxan, will trial cholestyramine or colestipol.  -- If continues to have diarrhea despite above, will will likely to pursue more invasive diagnostic testing such as upper endoscopy with small bowel biopsies and potentially repeat colonoscopy.     RTC 2 months    Thank you for this consultation.  It was a pleasure to participate in the care of this patient; please contact us with any further questions.   This note was created with voice recognition software, and while reviewed for accuracy, typos may remain.     Scott Medeiros MD   of Medicine  Inflammatory Bowel Disease Program  Division of Gastroenterology, Hepatology and Nutrition  [facility]      HPI  Here today for new evaluation for diarrhea.  Followed by Dr. Levi for renal transplant.     Recent hospitalization in January 2020 for diarrhea and acute kidney injury. She was feeling poorly for a week with mainly fatigue.  She can't recall exactly how symptoms, started but recalled that she thought she ate something bad in December.  Then symptoms progressed in January.      Patient having 20 stools a day for 10 days prior to admission, large volume and watery with weight loss.  No other fevers chills or night sweats.  Labs notable for hypokalemia. Stool testing negative for C. difficile, culture, ova and parasites.  GI was consulted and a colonoscopy was reported unremarkable, biopsies returned without diagnostic abnormality. Was also diagnosed with pyelonephritis secondary to E coli. Final diagnosis was postinfectious irritable bowel syndrome.  Potentially drug-induced diarrhea.    At discharge, was only having 9 stools a day.  In Feb was changed from cellcept to azathioprine. She is still having some stools.  Otherwise feels fine, but reports having a lot of stools with urgency.      Prior to this episode, was having about 1 stool a day, although was never fully solid stools.  After renal transplant (in  2011) had diarrhea that was med related. Improved with decreasing dose of Cellcept.     Currently she is having at least 6 stools a day. Depends on how much she eats.  Will have an urgent stool when she wakes up initially.  Stools are slightly formed. All stools have been non-bloody.  In past was all liquid. She is trying to drink 3 liters of water a day for her kidney. She was taking imodium and metamucil, but has stopped those since February.      Apart from borborygmi, no abdominal pain, nausea or vomiting.     She also has type 2 diabetes and has been told to avoid sugary drinks.     Otherwise, she reports feeling really well.      relevant work-up  -- Celiac markers negative 1/17/20 - TTG-IgA (normal IgA)  -- Normal colon biopsies 1/16/20      ROS:    No fevers or chills  No weight loss  No blurry vision, double vision or change in vision  No sore throat  No lymphadenopathy  No headache, paraesthesias, or weakness in a limb  No shortness of breath or wheezing  No chest pain or pressure  No arthralgias or myalgias  No rashes or skin changes  No odynophagia or dysphagia  No BRBPR, hematochezia, melena  No dysuria, frequency or urgency  No hot/cold intolerance or polyria  No anxiety or depression    PROBLEM LIST  Patient Active Problem List    Diagnosis Date Noted     Vitamin D deficiency 02/02/2020     Priority: Medium     Diabetes mellitus (H)      Priority: Medium     Type 2 diabetes, HbA1c goal < 7% (H) 02/26/2014     Priority: Medium     Dyslipidemia 06/04/2013     Priority: Medium     History of basal cell cancer      Priority: Medium     Immunosuppression (H)      Priority: Medium     Kidney replaced by transplant      Priority: Medium     HTN, kidney transplant related      Priority: Medium     Health Care Home 03/25/2013     Priority: Medium     Patient Care Plan      About Me    Patient Name Awa Alexander    Date of Birth 1965 Age 47   Home Phone [phone] Cell  Phone    Address: [address] [place] [address]  [address]  [place] [address] Email address    Insurance [employer] I.D. [id number]   [place] MRN [id number]       Emergency Contact Leny Antony (sister) Phone None listed   Other Emergency Contact  Phone      :     My Access Plan    Medical Emergency 911   Primary Clinic Line [phone] (phone #)  [phone] (fax [phone])     24 Hour Appointment Line [phone] or  [phone]) (toll-free)   24 Hour Nurse Line [phone] (toll-free)   Preferred Urgent Care    Preferred Hospital [facility]   Preferred Pharmacy [facility] - [place], [place] - [address] [facility]  Or   [facility] - [place], [place] - [address] [facility]  Or   [id number] - [place], [place] - [address] AT [facility] [address] [facility] [address]   Behavioral Health Crisis Line [facility], [phone]   or 911         My Care Team Members    Care Team Member  Name/Specialty Clinic, Address, Phone, Fax, E-mail Date of release on file   Primary Care Provider:    KORI MARES   [place] [facility]:  [address] [id number] [phone] (phone #)  [phone] (cell #)   [place]   Dr Manpreet Mares [facility] -general surgeon  [phone] [place]   Dr Molly Diaz    Post Transplant Coordinator [facility] -nephrologist  [phone]  Catalina Griffin, JAIRO  [phone] [facility] Complexity Tier:           Care Coordination Start Date:n/a   Frequency of Care Coordination:n/a   Form Last Updated: 03/25/2013         DX V65.8 REPLACED WITH [address] (04/08/2013)       Aftercare following organ transplant 01/08/2013     Priority: Medium     Ureteral stricture of right kidney transplant 11/08/2012     Priority: Medium     S/p ureteropyelostomy         PKD (polycystic kidney disease) 11/29/2011     Priority: Medium       PERTINENT PAST MEDICAL HISTORY:  Past Medical History:   Diagnosis Date     Anemia in  chronic kidney disease(285.21)      Diabetes mellitus (H) 2015     Dyslipidaemia      ESRD (end stage renal disease) (H)      High risk medication use      History of basal cell cancer     nose and ear     Immunosuppressed status (H)      Kidney replaced by transplant 2011     PKD (polycystic kidney disease) 1994       PREVIOUS SURGERIES:  Past Surgical History:   Procedure Laterality Date     APPENDECTOMY       CHOLECYSTECTOMY       CYSTOSCOPY, REMOVE STENT(S), COMBINED  1/24/2013    Procedure: COMBINED CYSTOSCOPY, REMOVE STENT(S);  Removal of Double J Stent ;  Surgeon: Manpreet Mares MD;  Location: [facility] OR     GYN SURGERY      supracervical hysterectomy.  pt has ovaries     INSERT STENT URETER  10/29/2012    Procedure: INSERT STENT URETER (PRE-OP);  Replacement of Single  J Stents with Tandem Stents;  Surgeon: Shahab Silva MD;  Location: [facility] OR     NEPHRECTOMY BILATERAL  12/21/2012    Procedure: NEPHRECTOMY BILATERAL;  Bilateral Native Nephrectomy with Ureteropylostomy, Appendectomy, Cholecystectomy, Transplant Ureter Stent Placement 6fr;  Surgeon: Manpreet Mares MD;  Location: [facility] OR     PERCUTANEOUS NEPHROSTOMY  3/16/2012    Procedure:PERCUTANEOUS NEPHROSTOMY; Double J Stent Change melissa; Surgeon:GASTON LAU; Location:[facility] OR     TRANSPLANT KIDNEY RECIPIENT LIVING UNRELATED  7/22/2011    Procedure:TRANSPLANT KIDNEY RECIPIENT LIVING UNRELATED; transplanted into right iliac fossa.; Surgeon:MANPREET MARES; Location:[facility] OR       ALLERGIES:   No Known Allergies    PERTINENT MEDICATIONS:    Current Outpatient Medications:      azaTHIOprine (IMURAN) 50 MG tablet, Take 3 tablets (150 mg) by mouth daily, Disp: 270 tablet, Rfl: 3     cholecalciferol (VITAMIN  -D) 1000 UNITS capsule, Take 5 capsules (5,000 Units) by mouth daily, Disp: 450 capsule, Rfl: 1     gemfibrozil (LOPID) 600 MG tablet, Take 600 mg by mouth, Disp: , Rfl:      ORDER FOR DME, Equipment being ordered: [employer] lancets, Disp: 100 Device, Rfl: 5     " OZEMPIC, 1 MG/DOSE, 2 MG/1.5ML pen, Every monday, Disp: , Rfl:      sulfamethoxazole-trimethoprim (BACTRIM/SEPTRA) 400-80 MG per tablet, Take 1 tablet by mouth every other day, Disp: 45 tablet, Rfl: 0     Sulfamethoxazole-Trimethoprim (SMZ-TMP DS PO), , Disp: , Rfl:      tacrolimus (GENERIC EQUIVALENT) 0.5 MG capsule, Take 1 capsule (0.5 mg) by mouth every evening HOLD, Disp: 90 capsule, Rfl: 3     tacrolimus (GENERIC EQUIVALENT) 1 MG capsule, Take 1 capsule (1 mg) by mouth every morning, Disp: 90 capsule, Rfl: 3     blood glucose (PHUONG CONTOUR NEXT) test strip, Use to test blood sugar three times daily or as directed. (Patient not taking: Reported on 1/30/2020), Disp: 100 strip, Rfl: prn    SOCIAL HISTORY:  Social History     Socioeconomic History     Marital status: Single     Spouse name: Not on file     Number of children: Not on file     Years of education: Not on file     Highest education level: Not on file   Occupational History     Not on file   Social Needs     Financial resource strain: Not on file     Food insecurity     Worry: Not on file     Inability: Not on file     Transportation needs     Medical: Not on file     Non-medical: Not on file   Tobacco Use     Smoking status: Never Smoker     Smokeless tobacco: Never Used   Substance and Sexual Activity     Alcohol use: No     Alcohol/week: 0.0 standard drinks     Comment: \"not anymore since the transplant\"     Drug use: No     Sexual activity: Yes     Partners: Male   Lifestyle     Physical activity     Days per week: Not on file     Minutes per session: Not on file     Stress: Not on file   Relationships     Social connections     Talks on phone: Not on file     Gets together: Not on file     Attends Yazidism service: Not on file     Active member of club or organization: Not on file     Attends meetings of clubs or organizations: Not on file     Relationship status: Not on file     Intimate partner violence     Fear of current or ex partner: Not " on file     Emotionally abused: Not on file     Physically abused: Not on file     Forced sexual activity: Not on file   Other Topics Concern     Parent/sibling w/ CABG, MI or angioplasty before 65F 55M? No   Social History Narrative     Not on file       FAMILY HISTORY:  Family History   Problem Relation Age of Onset     Genitourinary Problems Mother         PCKD     Cardiovascular Mother         brain aneurysm causing death     Genitourinary Problems Brother         PCKD     Genitourinary Problems Sister         PCKD     Diabetes Sister         on high dose steroid for kidney transplant       Past/family/social history reviewed and no changes    PHYSICAL EXAMINATION:  Vitals reviewed: There were no vitals taken for this visit.  Wt:   Wt Readings from Last 2 Encounters:   01/30/20 86.4 kg (190 lb 6.4 oz)   09/27/16 97.2 kg (214 lb 3.2 oz)      Constitutional - general appearance is well and in no acute distress. Body habitus normal  Eyes - No redness or discharge  Respiratory - No cough, unlabored breathing  Musculoskeletal - range of motion intact: Neck and arms  Skin - No discoloration or lesions  Neurological - No tremors, headaches  Psychiatric - No anxiety, alert & oriented      PERTINENT STUDIES:  Most recent CBC:  Recent Labs   Lab Test 05/19/20  0901 03/04/20  0712   WBC 3.0* 3.2*   HGB 12.0 12.4   HCT 34.4* 36.7    280     Most recent hepatic panel:  Recent Labs   Lab Test 04/22/15  0741 11/26/14  1439 06/03/14  1329   ALT 26 25 23   AST  --  20 19     Most recent creatinine:  Recent Labs   Lab Test 05/19/20  0901 03/04/20  0712   CR 1.24* 1.24*

## 2020-06-02 NOTE — PROGRESS NOTES
"Awa Alexander is a 54 year old female who is being evaluated via a billable video visit.      The patient has been notified of following:     \"This video visit will be conducted via a call between you and your physician/provider. We have found that certain health care needs can be provided without the need for an in-person physical exam.  This service lets us provide the care you need with a video conversation.  If a prescription is necessary we can send it directly to your pharmacy.  If lab work is needed we can place an order for that and you can then stop by our lab to have the test done at a later time.    Video visits are billed at different rates depending on your insurance coverage.  Please reach out to your insurance provider with any questions.    If during the course of the call the physician/provider feels a video visit is not appropriate, you will not be charged for this service.\"    Patient has given verbal consent for Video visit? Yes    How would you like to obtain your AVS? Macohart    Patient would like the video invitation sent by: Send to e-mail at: [email or web address]    Will anyone else be joining your video visit? No      Video-Visit Details    Type of service:  Video Visit    Video Start Time: 10:06 AM  Video End Time: 10:50    Originating Location (pt. Location): Home    Distant Location (provider location):  [facility]     Platform used for Video Visit: Low Medeiros MD        "

## 2020-06-02 NOTE — NURSING NOTE
Chief Complaint   Patient presents with     Consult     new consult       There were no vitals filed for this visit.    There is no height or weight on file to calculate BMI.    Nela Pang, CMA

## 2020-06-02 NOTE — LETTER
"    6/2/2020         RE: Awa Alexander  [id number] [facility] [address]        Dear Colleague,    Thank you for referring your patient, Awa Alexander, to the [facility]. Please see a copy of my visit note below.    Awa Alexander is a 54 year old female who is being evaluated via a billable video visit.      The patient has been notified of following:     \"This video visit will be conducted via a call between you and your physician/provider. We have found that certain health care needs can be provided without the need for an in-person physical exam.  This service lets us provide the care you need with a video conversation.  If a prescription is necessary we can send it directly to your pharmacy.  If lab work is needed we can place an order for that and you can then stop by our lab to have the test done at a later time.    Video visits are billed at different rates depending on your insurance coverage.  Please reach out to your insurance provider with any questions.    If during the course of the call the physician/provider feels a video visit is not appropriate, you will not be charged for this service.\"    Patient has given verbal consent for Video visit? Yes    How would you like to obtain your AVS? MyChart    Patient would like the video invitation sent by: Send to e-mail at: [email or web address]    Will anyone else be joining your video visit? No      Video-Visit Details    Type of service:  Video Visit    Video Start Time: 10:06 AM  Video End Time: 10:50    Originating Location (pt. Location): Home    Distant Location (provider location):  [facility]     Platform used for Video Visit: Low Medeiros MD          GI CLINIC VISIT    CC/REFERRING MD:  Referred Self  REASON FOR CONSULTATION: Diarrhea    ASSESSMENT/PLAN:  54 year old female with a renal transplant in 2011 with chronic diarrhea    1.  Diarrhea: Relatively acute onset " over the winter, severe diarrhea complicated by acute renal insufficiency, dehydration, hospitalization and coexisting pyelonephritis.  She has had over a 50% improvement in her diarrhea since her hospitalization but continues to have symptoms of loose stools with urgency.  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  I cannot completely rule out a medication effect, but her mycophenolate was stopped which will be the most likely culprit in her diarrhea has not dramatically improved.  Other possible causes would be bile salt diarrhea, or much less likely pancreatic exocrine insufficiency.    I also note that her white blood cell count has trended to be low since October.  Previously this was in the normal range on her immunosuppressive regimen.  I am not sure if there is a correlation with this and her diarrhea, however if her diarrhea is medication induced and it may be that she is having other systemic effects of immune suppression.    At this point will manage her as irritable bowel syndrome, postinfectious.  We will trial her on fiber and antispasmodic medications.  Should this not work will try rifaximin.  Should that not work we will try cholestyramine.    I discussed with our GI pharmacist about these medications and her transplant regimen, and there are no obvious interactions.  I will also review this with her transplant nephrologist.    -- Metamucil once to twice a day  -- Trial of Bentyl 10 mg twice a day, can increase to 4 times a day as needed  -- Stool for fat, Cryptosporidium and Giardia, and pancreatic elastase  -- If still having significant diarrhea and urgency in 1 month, will proceed with trial of Xifaxan  -- If does not respond to Xifaxan, will trial cholestyramine or colestipol.  -- If continues to have diarrhea despite above, will will likely to pursue more invasive diagnostic testing such as upper endoscopy with small  bowel biopsies and potentially repeat colonoscopy.     RTC 2 months    Thank you for this consultation.  It was a pleasure to participate in the care of this patient; please contact us with any further questions.   This note was created with voice recognition software, and while reviewed for accuracy, typos may remain.     Scott Medeiros MD   of Medicine  Inflammatory Bowel Disease Program  Division of Gastroenterology, Hepatology and Nutrition  [facility]      HPI  Here today for new evaluation for diarrhea.  Followed by Dr. Levi for renal transplant.     Recent hospitalization in January 2020 for diarrhea and acute kidney injury. She was feeling poorly for a week with mainly fatigue.  She can't recall exactly how symptoms, started but recalled that she thought she ate something bad in December.  Then symptoms progressed in January.      Patient having 20 stools a day for 10 days prior to admission, large volume and watery with weight loss.  No other fevers chills or night sweats.  Labs notable for hypokalemia. Stool testing negative for C. difficile, culture, ova and parasites.  GI was consulted and a colonoscopy was reported unremarkable, biopsies returned without diagnostic abnormality. Was also diagnosed with pyelonephritis secondary to E coli. Final diagnosis was postinfectious irritable bowel syndrome.  Potentially drug-induced diarrhea.    At discharge, was only having 9 stools a day.  In Feb was changed from cellcept to azathioprine. She is still having some stools.  Otherwise feels fine, but reports having a lot of stools with urgency.      Prior to this episode, was having about 1 stool a day, although was never fully solid stools.  After renal transplant (in 2011) had diarrhea that was med related. Improved with decreasing dose of Cellcept.     Currently she is having at least 6 stools a day. Depends on how much she eats.  Will have an urgent stool when she wakes up  initially.  Stools are slightly formed. All stools have been non-bloody.  In past was all liquid. She is trying to drink 3 liters of water a day for her kidney. She was taking imodium and metamucil, but has stopped those since February.      Apart from borborygmi, no abdominal pain, nausea or vomiting.     She also has type 2 diabetes and has been told to avoid sugary drinks.     Otherwise, she reports feeling really well.      relevant work-up  -- Celiac markers negative 1/17/20 - TTG-IgA (normal IgA)  -- Normal colon biopsies 1/16/20      ROS:    No fevers or chills  No weight loss  No blurry vision, double vision or change in vision  No sore throat  No lymphadenopathy  No headache, paraesthesias, or weakness in a limb  No shortness of breath or wheezing  No chest pain or pressure  No arthralgias or myalgias  No rashes or skin changes  No odynophagia or dysphagia  No BRBPR, hematochezia, melena  No dysuria, frequency or urgency  No hot/cold intolerance or polyria  No anxiety or depression    PROBLEM LIST  Patient Active Problem List    Diagnosis Date Noted     Vitamin D deficiency 02/02/2020     Priority: Medium     Diabetes mellitus (H)      Priority: Medium     Type 2 diabetes, HbA1c goal < 7% (H) 02/26/2014     Priority: Medium     Dyslipidemia 06/04/2013     Priority: Medium     History of basal cell cancer      Priority: Medium     Immunosuppression (H)      Priority: Medium     Kidney replaced by transplant      Priority: Medium     HTN, kidney transplant related      Priority: Medium     Health Care Home 03/25/2013     Priority: Medium     Patient Care Plan      About Me    Patient Name Awa Alexander    Date of Birth 1965 Age 47   Home Phone [phone] Cell  Phone    Address: [address] [place] [address] [address]  [place] [address] Email address    Insurance [employer] I.[employer]. [id number]   [facility] [id number]       Emergency Contact Leny Antony (sister) Phone None listed   Other  Emergency Contact  Phone      :     My Access Plan    Medical Emergency 911   Primary Clinic Line [phone] (phone #)  [phone] (fax 3)     24 Hour Appointment Line [phone] or  [phone]) (toll-free)   24 Hour Nurse Line [phone] (toll-free)   Preferred Urgent Care    Preferred Hospital [facility]   Preferred Pharmacy [facility] - [place], [place] - [address] [facility]  Or   [facility] - [place], [place] - [address]   Or   [employer] [address] [place], [place] - [address] AT [facility] OF [facility] [address]   Behavioral Health Crisis Line [facility], [phone]   or 911         My Care Team Members    Care Team Member  Name/Specialty Clinic, Address, Phone, Fax, E-mail Date of release on file   Primary Care Provider:    [facility]:  [address] [address]  [phone] (phone #)  [phone] (cell #)   [place]   Dr Manpreet Lopes [facility] -general surgeon  [phone] [place]   Dr Molly Diaz    Post Transplant Coordinator [facility] -nephrologist  [phone]  Catalina Griffin, RN  [phone] [facility] Complexity Tier:           Care Coordination Start Date:n/a   Frequency of Care Coordination:n/a   Form Last Updated: 03/25/2013         DX V65.8 REPLACED WITH [address] [facility] (04/08/2013)       Aftercare following organ transplant 01/08/2013     Priority: Medium     Ureteral stricture of right kidney transplant 11/08/2012     Priority: Medium     S/p ureteropyelostomy         PKD (polycystic kidney disease) 11/29/2011     Priority: Medium       PERTINENT PAST MEDICAL HISTORY:  Past Medical History:   Diagnosis Date     Anemia in chronic kidney disease(285.21)      Diabetes mellitus (H) 2015     Dyslipidaemia      ESRD (end stage renal disease) (H)      High risk medication use      History of basal cell cancer     nose and ear      Immunosuppressed status (H)      Kidney replaced by transplant 2011     PKD (polycystic kidney disease) 1994       PREVIOUS SURGERIES:  Past Surgical History:   Procedure Laterality Date     APPENDECTOMY       CHOLECYSTECTOMY       CYSTOSCOPY, REMOVE STENT(S), COMBINED  1/24/2013    Procedure: COMBINED CYSTOSCOPY, REMOVE STENT(S);  Removal of Double J Stent ;  Surgeon: Manpreet Mares MD;  Location: [facility] OR     GYN SURGERY      supracervical hysterectomy.  pt has ovaries     INSERT STENT URETER  10/29/2012    Procedure: INSERT STENT URETER (PRE-OP);  Replacement of Single  J Stents with Tandem Stents;  Surgeon: Shahab Silva MD;  Location: [facility] OR     NEPHRECTOMY BILATERAL  12/21/2012    Procedure: NEPHRECTOMY BILATERAL;  Bilateral Native Nephrectomy with Ureteropylostomy, Appendectomy, Cholecystectomy, Transplant Ureter Stent Placement 6fr;  Surgeon: Manpreet Mares MD;  Location: [facility] OR     PERCUTANEOUS NEPHROSTOMY  3/16/2012    Procedure:PERCUTANEOUS NEPHROSTOMY; Double J Stent Change melissa; Surgeon:GASTON LAU; Location:[facility] OR     TRANSPLANT KIDNEY RECIPIENT LIVING UNRELATED  7/22/2011    Procedure:TRANSPLANT KIDNEY RECIPIENT LIVING UNRELATED; transplanted into right iliac fossa.; Surgeon:MANPREET MARES; Location:[facility] OR       ALLERGIES:   No Known Allergies    PERTINENT MEDICATIONS:    Current Outpatient Medications:      azaTHIOprine (IMURAN) 50 MG tablet, Take 3 tablets (150 mg) by mouth daily, Disp: 270 tablet, Rfl: 3     cholecalciferol (VITAMIN  -D) 1000 UNITS capsule, Take 5 capsules (5,000 Units) by mouth daily, Disp: 450 capsule, Rfl: 1     gemfibrozil (LOPID) 600 MG tablet, Take 600 mg by mouth, Disp: , Rfl:      ORDER FOR DME, Equipment being ordered: geronimo lancets, Disp: 100 Device, Rfl: 5     OZEMPIC, 1 MG/DOSE, 2 MG/1.5ML pen, Every monday, Disp: , Rfl:      sulfamethoxazole-trimethoprim (BACTRIM/SEPTRA) 400-80 MG per tablet, Take 1 tablet by mouth every other day, Disp: 45 tablet, Rfl:  "0     Sulfamethoxazole-Trimethoprim (SMZ-TMP DS PO), , Disp: , Rfl:      tacrolimus (GENERIC EQUIVALENT) 0.5 MG capsule, Take 1 capsule (0.5 mg) by mouth every evening HOLD, Disp: 90 capsule, Rfl: 3     tacrolimus (GENERIC EQUIVALENT) 1 MG capsule, Take 1 capsule (1 mg) by mouth every morning, Disp: 90 capsule, Rfl: 3     blood glucose (PHUONG CONTOUR NEXT) test strip, Use to test blood sugar three times daily or as directed. (Patient not taking: Reported on 1/30/2020), Disp: 100 strip, Rfl: prn    SOCIAL HISTORY:  Social History     Socioeconomic History     Marital status: Single     Spouse name: Not on file     Number of children: Not on file     Years of education: Not on file     Highest education level: Not on file   Occupational History     Not on file   Social Needs     Financial resource strain: Not on file     Food insecurity     Worry: Not on file     Inability: Not on file     Transportation needs     Medical: Not on file     Non-medical: Not on file   Tobacco Use     Smoking status: Never Smoker     Smokeless tobacco: Never Used   Substance and Sexual Activity     Alcohol use: No     Alcohol/week: 0.0 standard drinks     Comment: \"not anymore since the transplant\"     Drug use: No     Sexual activity: Yes     Partners: Male   Lifestyle     Physical activity     Days per week: Not on file     Minutes per session: Not on file     Stress: Not on file   Relationships     Social connections     Talks on phone: Not on file     Gets together: Not on file     Attends [employer] service: Not on file     Active member of club or organization: Not on file     Attends meetings of clubs or organizations: Not on file     Relationship status: Not on file     Intimate partner violence     Fear of current or ex partner: Not on file     Emotionally abused: Not on file     Physically abused: Not on file     Forced sexual activity: Not on file   Other Topics Concern     Parent/sibling w/ CABG, MI or angioplasty before 65F " 55M? No   Social History Narrative     Not on file       FAMILY HISTORY:  Family History   Problem Relation Age of Onset     Genitourinary Problems Mother         PCKD     Cardiovascular Mother         brain aneurysm causing death     Genitourinary Problems Brother         PCKD     Genitourinary Problems Sister         PCKD     Diabetes Sister         on high dose steroid for kidney transplant       Past/family/social history reviewed and no changes    PHYSICAL EXAMINATION:  Vitals reviewed: There were no vitals taken for this visit.  Wt:   Wt Readings from Last 2 Encounters:   01/30/20 86.4 kg (190 lb 6.4 oz)   09/27/16 97.2 kg (214 lb 3.2 oz)      Constitutional - general appearance is well and in no acute distress. Body habitus normal  Eyes - No redness or discharge  Respiratory - No cough, unlabored breathing  Musculoskeletal - range of motion intact: Neck and arms  Skin - No discoloration or lesions  Neurological - No tremors, headaches  Psychiatric - No anxiety, alert & oriented      PERTINENT STUDIES:  Most recent CBC:  Recent Labs   Lab Test 05/19/20  0901 03/04/20  0712   WBC 3.0* 3.2*   HGB 12.0 12.4   HCT 34.4* 36.7    280     Most recent hepatic panel:  Recent Labs   Lab Test 04/22/15  0741 11/26/14  1439 06/03/14  1329   ALT 26 25 23   AST  --  20 19     Most recent creatinine:  Recent Labs   Lab Test 05/19/20  0901 03/04/20  0712   CR 1.24* 1.24*

## 2020-06-02 NOTE — PROGRESS NOTES
Contacted pt to discuss after visit instructions.   Follow up appt scheduled          Kia - plan in AVS, I have placed all orders.     Basically right now just trying Bentyl and fiber.     I did place an order for rifaximin for irritable bowel syndrome.  I have instructed her only to pick this medication up if it is approved, and if she is not responding to Bentyl and fiber.       Kia can you check in with her in 1 month to see how she is doing?     Latoya would you mind checking to see if there are any drug interactions with rifaximin or cholestyramine or Bentyl and her transplant medications (tacrolimus and azathioprine)     Thanks!

## 2020-06-03 NOTE — PROGRESS NOTES
Jennifer Lyn, [facility]  Scott Medeiros MD; Kia Manning RN               I checked both UptoDate and Micromedex and did not see any major drug drug interactions between tac/aza and the agents listed.

## 2020-06-10 DIAGNOSIS — Z94.0 KIDNEY TRANSPLANTED: ICD-10-CM

## 2020-06-10 RX ORDER — SULFAMETHOXAZOLE AND TRIMETHOPRIM 400; 80 MG/1; MG/1
1 TABLET ORAL EVERY OTHER DAY
Qty: 45 TABLET | Refills: 3 | Status: SHIPPED | OUTPATIENT
Start: 2020-06-10 | End: 2020-12-21

## 2020-06-10 NOTE — TELEPHONE ENCOUNTER
Patient Call: Medication Refill  Route to LPN  Instruct the patient to first contact their pharmacy. If they have called their pharmacy and require further assistance, route to LPN.    Pharmacy Name: [facility]   Pharmacy Location: [place]-- Mail order   Name of Medication: Bactrim 90 day supply   When will the patient be out of this medication?: Greater than 3 days (Route standard priority)

## 2020-06-17 ENCOUNTER — TELEPHONE (OUTPATIENT)
Dept: TRANSPLANT | Facility: CLINIC | Age: 55
End: 2020-06-17

## 2020-06-17 NOTE — TELEPHONE ENCOUNTER
Pt called is due for her 2nd Shingles vaccine and her family  told her not to get it  Needs if OK to get

## 2020-06-17 NOTE — TELEPHONE ENCOUNTER
"Advised Awa that yes she can get the Shringrix vaccination as it is not a \"live\" virus. She should NOT get the Varicella-Zoster (Zostavax) or Varivax.  "

## 2020-07-06 ENCOUNTER — PATIENT OUTREACH (OUTPATIENT)
Dept: GASTROENTEROLOGY | Facility: CLINIC | Age: 55
End: 2020-07-06

## 2020-07-06 NOTE — PROGRESS NOTES
Contacted patient for symptom update.   Patient stated her stools are better. Did not start the bentyl as she is feeling better  Has labs next week.  Will keep us posted if symptoms cane         did place an order for rifaximin for irritable bowel syndrome.  I have instructed her only to pick this medication up if it is approved, and if she is not responding to Bentyl and fiber.       Kia can you check in with her in 1 month to see how she is doing

## 2020-07-15 ENCOUNTER — PATIENT OUTREACH (OUTPATIENT)
Dept: GASTROENTEROLOGY | Facility: CLINIC | Age: 55
End: 2020-07-15

## 2020-07-15 DIAGNOSIS — Z48.298 AFTERCARE FOLLOWING ORGAN TRANSPLANT: ICD-10-CM

## 2020-07-15 DIAGNOSIS — Z79.899 ENCOUNTER FOR LONG-TERM CURRENT USE OF MEDICATION: ICD-10-CM

## 2020-07-15 DIAGNOSIS — Z94.0 KIDNEY REPLACED BY TRANSPLANT: ICD-10-CM

## 2020-07-15 LAB
ANION GAP SERPL CALCULATED.3IONS-SCNC: 6 MMOL/L (ref 3–14)
BUN SERPL-MCNC: 22 MG/DL (ref 7–30)
CALCIUM SERPL-MCNC: 9.3 MG/DL (ref 8.5–10.1)
CHLORIDE SERPL-SCNC: 111 MMOL/L (ref 94–109)
CO2 SERPL-SCNC: 21 MMOL/L (ref 20–32)
CREAT SERPL-MCNC: 1.43 MG/DL (ref 0.52–1.04)
ERYTHROCYTE [DISTWIDTH] IN BLOOD BY AUTOMATED COUNT: 13.4 % (ref 10–15)
GFR SERPL CREATININE-BSD FRML MDRD: 41 ML/MIN/{1.73_M2}
GLUCOSE SERPL-MCNC: 102 MG/DL (ref 70–99)
HCT VFR BLD AUTO: 34.5 % (ref 35–47)
HGB BLD-MCNC: 11.9 G/DL (ref 11.7–15.7)
MCH RBC QN AUTO: 34.4 PG (ref 26.5–33)
MCHC RBC AUTO-ENTMCNC: 34.5 G/DL (ref 31.5–36.5)
MCV RBC AUTO: 100 FL (ref 78–100)
PLATELET # BLD AUTO: 270 10E9/L (ref 150–450)
POTASSIUM SERPL-SCNC: 4.1 MMOL/L (ref 3.4–5.3)
RBC # BLD AUTO: 3.46 10E12/L (ref 3.8–5.2)
SODIUM SERPL-SCNC: 138 MMOL/L (ref 133–144)
TACROLIMUS BLD-MCNC: 6.9 UG/L (ref 5–15)
TME LAST DOSE: NORMAL H
WBC # BLD AUTO: 2.7 10E9/L (ref 4–11)

## 2020-07-15 PROCEDURE — 80048 BASIC METABOLIC PNL TOTAL CA: CPT | Performed by: INTERNAL MEDICINE

## 2020-07-15 PROCEDURE — 85027 COMPLETE CBC AUTOMATED: CPT | Performed by: INTERNAL MEDICINE

## 2020-07-15 PROCEDURE — 36415 COLL VENOUS BLD VENIPUNCTURE: CPT | Performed by: INTERNAL MEDICINE

## 2020-07-15 PROCEDURE — 80197 ASSAY OF TACROLIMUS: CPT | Performed by: INTERNAL MEDICINE

## 2020-10-05 ENCOUNTER — TELEPHONE (OUTPATIENT)
Dept: TRANSPLANT | Facility: CLINIC | Age: 55
End: 2020-10-05

## 2020-10-05 NOTE — TELEPHONE ENCOUNTER
Patient Call: General  Route to LPN    Reason for call: Pt has arthritis in left knee  They want her to check with us if OK to get a cortisone shot     Call back needed? Yes    Return Call Needed  Same as documented in contacts section  When to return call?: Greater than one day: Route standard priority

## 2020-10-05 NOTE — TELEPHONE ENCOUNTER
Asked Dr. Levi on rounds today, 10/5/20, if okay for Awa to receive cortisone shot in L knee for arthritis.     Dr. Levi states that it is fine to receive.     Awa states she had figured it would be okay because her sister has gotten several and they said it was okay for her but she just wanted to make sure.

## 2020-10-14 DIAGNOSIS — Z48.298 AFTERCARE FOLLOWING ORGAN TRANSPLANT: ICD-10-CM

## 2020-10-14 DIAGNOSIS — Z94.0 KIDNEY REPLACED BY TRANSPLANT: ICD-10-CM

## 2020-10-14 DIAGNOSIS — Z79.899 ENCOUNTER FOR LONG-TERM CURRENT USE OF MEDICATION: ICD-10-CM

## 2020-10-14 LAB
ERYTHROCYTE [DISTWIDTH] IN BLOOD BY AUTOMATED COUNT: 13.1 % (ref 10–15)
HCT VFR BLD AUTO: 36.3 % (ref 35–47)
HGB BLD-MCNC: 12.5 G/DL (ref 11.7–15.7)
MCH RBC QN AUTO: 33.8 PG (ref 26.5–33)
MCHC RBC AUTO-ENTMCNC: 34.4 G/DL (ref 31.5–36.5)
MCV RBC AUTO: 98 FL (ref 78–100)
PLATELET # BLD AUTO: 225 10E9/L (ref 150–450)
RBC # BLD AUTO: 3.7 10E12/L (ref 3.8–5.2)
TACROLIMUS BLD-MCNC: 6.3 UG/L (ref 5–15)
TME LAST DOSE: NORMAL H
WBC # BLD AUTO: 4 10E9/L (ref 4–11)

## 2020-10-14 PROCEDURE — 85027 COMPLETE CBC AUTOMATED: CPT | Performed by: INTERNAL MEDICINE

## 2020-10-14 PROCEDURE — 80048 BASIC METABOLIC PNL TOTAL CA: CPT | Performed by: INTERNAL MEDICINE

## 2020-10-14 PROCEDURE — 36415 COLL VENOUS BLD VENIPUNCTURE: CPT | Performed by: INTERNAL MEDICINE

## 2020-10-14 PROCEDURE — 80197 ASSAY OF TACROLIMUS: CPT | Performed by: INTERNAL MEDICINE

## 2020-10-15 LAB
ANION GAP SERPL CALCULATED.3IONS-SCNC: 6 MMOL/L (ref 3–14)
BUN SERPL-MCNC: 20 MG/DL (ref 7–30)
CALCIUM SERPL-MCNC: 9.3 MG/DL (ref 8.5–10.1)
CHLORIDE SERPL-SCNC: 110 MMOL/L (ref 94–109)
CO2 SERPL-SCNC: 23 MMOL/L (ref 20–32)
CREAT SERPL-MCNC: 1.26 MG/DL (ref 0.52–1.04)
GFR SERPL CREATININE-BSD FRML MDRD: 48 ML/MIN/{1.73_M2}
GLUCOSE SERPL-MCNC: 113 MG/DL (ref 70–99)
POTASSIUM SERPL-SCNC: 4.9 MMOL/L (ref 3.4–5.3)
SODIUM SERPL-SCNC: 139 MMOL/L (ref 133–144)

## 2020-11-23 ENCOUNTER — TELEPHONE (OUTPATIENT)
Dept: TRANSPLANT | Facility: CLINIC | Age: 55
End: 2020-11-23

## 2020-11-23 DIAGNOSIS — Z94.0 S/P KIDNEY TRANSPLANT: ICD-10-CM

## 2020-11-23 DIAGNOSIS — Z94.0 KIDNEY REPLACED BY TRANSPLANT: Primary | ICD-10-CM

## 2020-11-23 DIAGNOSIS — R79.89 LOW VITAMIN D LEVEL: ICD-10-CM

## 2020-11-23 DIAGNOSIS — Z79.899 ENCOUNTER FOR LONG-TERM CURRENT USE OF MEDICATION: ICD-10-CM

## 2020-11-23 RX ORDER — TACROLIMUS 1 MG/1
1 CAPSULE ORAL EVERY MORNING
Qty: 90 CAPSULE | Refills: 3 | Status: SHIPPED | OUTPATIENT
Start: 2020-11-23 | End: 2020-12-21

## 2020-11-23 RX ORDER — TACROLIMUS 0.5 MG/1
0.5 CAPSULE ORAL EVERY EVENING
Qty: 90 CAPSULE | Refills: 3 | COMMUNITY
Start: 2020-11-23 | End: 2020-12-21

## 2020-11-23 NOTE — TELEPHONE ENCOUNTER
"Mathieu Message received:    \"Thank you for sending, I have an issue, for a while my speciality pharmacy was out of Tacrolimus so they switched me to prograf so i wouldn't miss my medication.  I need to go back to generic as my insurance won't cover Prograf,  is there a way Dr Levi can put thru a new prescription for the Tacolimus, they said to either call or fax at this Number: Yes, you may have your doctor call us at [phone] to provide verbal prescription or send a fax at [phone].   I don't understand as they didn't need one when they switched me to Prograf,  let me know if i need to do anything else,  I am good on meds so this isn't a rush.  Awa\"    OUTCOME: New prescription for tacrolimus generic 1 mg capsules sent to patient preferred pharmacy on file. Notified Awa, refill for tacrolimus 1 mg was sent.   "

## 2020-12-21 ENCOUNTER — TELEPHONE (OUTPATIENT)
Dept: TRANSPLANT | Facility: CLINIC | Age: 55
End: 2020-12-21

## 2020-12-21 DIAGNOSIS — Z94.0 KIDNEY REPLACED BY TRANSPLANT: ICD-10-CM

## 2020-12-21 DIAGNOSIS — Z79.899 ENCOUNTER FOR LONG-TERM CURRENT USE OF MEDICATION: ICD-10-CM

## 2020-12-21 DIAGNOSIS — Z94.0 S/P KIDNEY TRANSPLANT: ICD-10-CM

## 2020-12-21 DIAGNOSIS — Z94.0 KIDNEY TRANSPLANTED: Primary | ICD-10-CM

## 2020-12-21 DIAGNOSIS — R79.89 LOW VITAMIN D LEVEL: ICD-10-CM

## 2020-12-21 DIAGNOSIS — D84.9 IMMUNOSUPPRESSED STATUS (H): ICD-10-CM

## 2020-12-21 RX ORDER — TACROLIMUS 0.5 MG/1
0.5 CAPSULE ORAL EVERY EVENING
Qty: 90 CAPSULE | Refills: 3 | Status: SHIPPED | OUTPATIENT
Start: 2020-12-21 | End: 2021-12-23

## 2020-12-21 RX ORDER — TACROLIMUS 1 MG/1
1 CAPSULE ORAL EVERY MORNING
Qty: 90 CAPSULE | Refills: 3 | Status: SHIPPED | OUTPATIENT
Start: 2020-12-21 | End: 2021-12-23

## 2020-12-21 RX ORDER — SULFAMETHOXAZOLE AND TRIMETHOPRIM 400; 80 MG/1; MG/1
1 TABLET ORAL EVERY OTHER DAY
Qty: 45 TABLET | Refills: 3 | Status: SHIPPED | OUTPATIENT
Start: 2020-12-21 | End: 2021-11-30

## 2020-12-21 RX ORDER — AZATHIOPRINE 50 MG/1
150 TABLET ORAL DAILY
Qty: 270 TABLET | Refills: 3 | Status: SHIPPED | OUTPATIENT
Start: 2020-12-21 | End: 2021-11-30

## 2020-12-21 NOTE — TELEPHONE ENCOUNTER
Patient Call: Medication Refill  Route to LPN  Instruct the patient to first contact their pharmacy. If they have called their pharmacy and require further assistance, route to LPN.    Pharmacy Name: [facility]  Pharmacy Location: mail orde  Name of Medication: AZA and TMP sulfa   And [facility]- tacro generic- 0.5 mg   When will the patient be out of this medication?: Greater than 3 days (Route standard priority)

## 2020-12-22 NOTE — TELEPHONE ENCOUNTER
Returned call to patient regarding COVID-19 vaccine. She will await communication from transplant infectious disease team but if available plans to take when offered.     She states she is planning on scheduling upcoming transplant nephrology appt. Refills on meds were sent yesterday 12/21/20. Lab appt mid January per Awa.

## 2021-01-13 DIAGNOSIS — Z48.298 AFTERCARE FOLLOWING ORGAN TRANSPLANT: ICD-10-CM

## 2021-01-13 DIAGNOSIS — Z94.0 KIDNEY REPLACED BY TRANSPLANT: ICD-10-CM

## 2021-01-13 DIAGNOSIS — Z79.899 ENCOUNTER FOR LONG-TERM CURRENT USE OF MEDICATION: ICD-10-CM

## 2021-01-13 LAB
ANION GAP SERPL CALCULATED.3IONS-SCNC: 5 MMOL/L (ref 3–14)
BUN SERPL-MCNC: 21 MG/DL (ref 7–30)
CALCIUM SERPL-MCNC: 9.3 MG/DL (ref 8.5–10.1)
CHLORIDE SERPL-SCNC: 108 MMOL/L (ref 94–109)
CO2 SERPL-SCNC: 23 MMOL/L (ref 20–32)
CREAT SERPL-MCNC: 1.44 MG/DL (ref 0.52–1.04)
ERYTHROCYTE [DISTWIDTH] IN BLOOD BY AUTOMATED COUNT: 14.2 % (ref 10–15)
GFR SERPL CREATININE-BSD FRML MDRD: 40 ML/MIN/{1.73_M2}
GLUCOSE SERPL-MCNC: 142 MG/DL (ref 70–99)
HCT VFR BLD AUTO: 35.8 % (ref 35–47)
HGB BLD-MCNC: 12.5 G/DL (ref 11.7–15.7)
MCH RBC QN AUTO: 33.5 PG (ref 26.5–33)
MCHC RBC AUTO-ENTMCNC: 34.9 G/DL (ref 31.5–36.5)
MCV RBC AUTO: 96 FL (ref 78–100)
PLATELET # BLD AUTO: 229 10E9/L (ref 150–450)
POTASSIUM SERPL-SCNC: 4.9 MMOL/L (ref 3.4–5.3)
PROT UR-MCNC: 0.41 G/L
PROT/CREAT 24H UR: 0.36 G/G CR (ref 0–0.2)
RBC # BLD AUTO: 3.73 10E12/L (ref 3.8–5.2)
SODIUM SERPL-SCNC: 136 MMOL/L (ref 133–144)
TACROLIMUS BLD-MCNC: 5.4 UG/L (ref 5–15)
TME LAST DOSE: NORMAL H
WBC # BLD AUTO: 3.4 10E9/L (ref 4–11)

## 2021-01-13 PROCEDURE — 84156 ASSAY OF PROTEIN URINE: CPT | Performed by: INTERNAL MEDICINE

## 2021-01-13 PROCEDURE — 85027 COMPLETE CBC AUTOMATED: CPT | Performed by: INTERNAL MEDICINE

## 2021-01-13 PROCEDURE — 36415 COLL VENOUS BLD VENIPUNCTURE: CPT | Performed by: INTERNAL MEDICINE

## 2021-01-13 PROCEDURE — 80048 BASIC METABOLIC PNL TOTAL CA: CPT | Performed by: INTERNAL MEDICINE

## 2021-01-13 PROCEDURE — 80197 ASSAY OF TACROLIMUS: CPT | Performed by: INTERNAL MEDICINE

## 2021-01-15 ENCOUNTER — HEALTH MAINTENANCE LETTER (OUTPATIENT)
Age: 56
End: 2021-01-15

## 2021-02-19 ENCOUNTER — DOCUMENTATION ONLY (OUTPATIENT)
Dept: CARE COORDINATION | Facility: CLINIC | Age: 56
End: 2021-02-19

## 2021-03-11 ENCOUNTER — VIRTUAL VISIT (OUTPATIENT)
Dept: NEPHROLOGY | Facility: CLINIC | Age: 56
End: 2021-03-11
Attending: INTERNAL MEDICINE
Payer: COMMERCIAL

## 2021-03-11 VITALS — SYSTOLIC BLOOD PRESSURE: 108 MMHG | DIASTOLIC BLOOD PRESSURE: 59 MMHG

## 2021-03-11 DIAGNOSIS — Z48.298 AFTERCARE FOLLOWING ORGAN TRANSPLANT: Primary | ICD-10-CM

## 2021-03-11 DIAGNOSIS — D84.9 IMMUNOSUPPRESSED STATUS (H): ICD-10-CM

## 2021-03-11 DIAGNOSIS — D84.9 IMMUNOSUPPRESSION (H): ICD-10-CM

## 2021-03-11 DIAGNOSIS — E11.9 TYPE 2 DIABETES, HBA1C GOAL < 7% (H): ICD-10-CM

## 2021-03-11 PROCEDURE — 99213 OFFICE O/P EST LOW 20 MIN: CPT | Mod: TEL | Performed by: INTERNAL MEDICINE

## 2021-03-11 RX ORDER — CHLORAL HYDRATE 500 MG
1 CAPSULE ORAL 2 TIMES DAILY
COMMUNITY

## 2021-03-11 ASSESSMENT — PAIN SCALES - GENERAL: PAINLEVEL: NO PAIN (0)

## 2021-03-11 NOTE — PROGRESS NOTES
Awa is a 55 year old who is being evaluated via a billable telephone visit.      What phone number would you like to be contacted at? [phone]  How would you like to obtain your AVS? Mail a copy  Phone call duration: 10 minutes    Start 4:39  Stop 4:49    CHRONIC TRANSPLANT NEPHROLOGY VISIT    Assessment & Plan   # LDKT: Stable   - Baseline Cr ~ 1.2-1.4    - Proteinuria: Minimal (0.2-0.5 grams)   - Date DSA Last Checked: Jun/2017      Latest DSA: No   - BK Viremia: No   - Kidney Tx Biopsy: Sep 22, 2001; Result: No rejection. There are no glomerular, vascular or tubulointerstitial abnormalities     # Immunosuppression: tacrolimus (goal trough 4-6) &  mg po every day in Jan   - Changes: No.    Continue with intensive monitoring of immunosuppression for efficacy and toxicity.      # Infection Prophylaxis:   - PJP: Sulfa/TMP (Bactrim)    # Hypertension: Controlled, but low at times;  Goal BP: > 100, but < 130 systolic   - Changes: No, she has chronic low BP but denies lightheadedness or dizziness.    # Diabetes: controlled Last HbA1c: 5.5%   - Management as per primary care.    # Diarrhea: resolved after stopping gemfibrozil, stopped last July; she was also switched from MMF to AZA    # Anemia in Chronic Renal Disease: Hgb: Stable (13.6g/dL)     CHANCE: No   - Iron studies: Replete    # Mineral Bone Disorder:   - Vitamin D; level: Not checked recently, but was low last check        On Supplement: Yes 1000 international unit(s) qd  - Calcium; level: Normal        On Supplement: No      # Aneurysm Risk in PKD: Had MRA in 2006 that was normal    # Skin Cancer Risk:    - Discussed sun protection and recommend regular follow up with Dermatology.    # Medical Compliance: Yes    # Transplant History:  Etiology of Kidney Failure: Polycystic kidney disease (PKD)  Tx: LDKT  Transplant: 7/22/2011 (Kidney)  Donor Type: Living Donor Class: Standard Criteria Donor  Significant changes in immunosuppression: None  Significant  "transplant-related complications: None    Transplant Office Phone Number: [phone]    Assessment and plan was discussed with the patient and she voiced her understanding and agreement.    Return visit: No follow-ups on file.     Chief Complaint   Ms. Alexander is a 55 year old here for routine follow up.    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   She feels well overall and reports no new complaints. Energy level is good. Denies any fevers, chills, weight loss, night sweats. No nausea, vomiting, abdominal pain, diarrhea. No chest pain, sob, leg swelling. No recent illness or hospitalization. She plans to get the COVID vaccine tomorrow    Home -110s    Review of Systems   A comprehensive review of systems was obtained and negative, except as noted in the HPI or PMH.    Problem List   Patient Active Problem List   Diagnosis     PKD (polycystic kidney disease)     Ureteral stricture of right kidney transplant     Aftercare following organ transplant     Health Care Home     History of basal cell cancer     Immunosuppression (H)     Kidney replaced by transplant     HTN, kidney transplant related     Dyslipidemia     Type 2 diabetes, HbA1c goal < 7% (H)     Diabetes mellitus (H)     Vitamin D deficiency       Social History   Social History     Tobacco Use     Smoking status: Never Smoker     Smokeless tobacco: Never Used   Substance Use Topics     Alcohol use: No     Alcohol/week: 0.0 standard drinks     Comment: \"not anymore since the transplant\"     Drug use: No       Allergies   Allergies   Allergen Reactions     Cefazolin Other (See Comments)     Patient tolerated ceftriaxone and ceftazidime during 1/13/2020 admission to [facility] - ceftriaxone was tolerated after possible reaction to cefazolin.  Pt describes rxn as facial swelling     Flurbiprofen Other (See Comments)     Can not take due to " kidney transplant  Can not take due to kidney transplant       Nsaids      PN: LW Reaction: KIDNEY PROBLEM       Medications   Current Outpatient Medications   Medication Sig     azaTHIOprine (IMURAN) 50 MG tablet Take 3 tablets (150 mg) by mouth daily     OZEMPIC, 1 MG/DOSE, 2 MG/1.5ML pen Every monday     sulfamethoxazole-trimethoprim (BACTRIM) 400-80 MG tablet Take 1 tablet by mouth every other day     tacrolimus (GENERIC EQUIVALENT) 0.5 MG capsule Take 1 capsule (0.5 mg) by mouth every evening     tacrolimus (GENERIC EQUIVALENT) 1 MG capsule Take 1 capsule (1 mg) by mouth every morning     blood glucose ([employer] CONTOUR NEXT) test strip Use to test blood sugar three times daily or as directed. (Patient not taking: Reported on 1/30/2020)     cholecalciferol (VITAMIN  -D) 1000 UNITS capsule Take 5 capsules (5,000 Units) by mouth daily     dicyclomine (BENTYL) 10 MG capsule Take 1 capsule (10 mg) by mouth 2 times daily     gemfibrozil (LOPID) 600 MG tablet Take 600 mg by mouth     ORDER FOR DME Equipment being ordered: geronimo lancets     Sulfamethoxazole-Trimethoprim (SMZ-TMP DS PO)      No current facility-administered medications for this visit.      There are no discontinued medications.    Physical Exam   Vital Signs: /59   Deferred     Data     Renal Latest Ref Rng & Units 1/13/2021 10/14/2020 7/15/2020   Na 133 - 144 mmol/L 136 139 138   Na (external) 136 - 145 mmol/L - - -   K 3.4 - 5.3 mmol/L 4.9 4.9 4.1   K (external) 3.5 - 5.1 mmol/L - - -   Cl 94 - 109 mmol/L 108 110(H) 111(H)   Cl (external) 98 - 109 mmol/L - - -   CO2 20 - 32 mmol/L 23 23 21   CO2 (external) 20 - 29 mmol/L - - -   BUN 7 - 30 mg/dL 21 20 22   BUN (external) 7 - 26 mg/dL - - -   Cr 0.52 - 1.04 mg/dL 1.44(H) 1.26(H) 1.43(H)   Cr (external) mg/dL - - -   Glucose 70 - 99 mg/dL 142(H) 113(H) 102(H)   Glucose (external) 70 - 100 mg/dL - - -   Ca  8.5 - 10.1 mg/dL 9.3 9.3 9.3   Ca (external) 8.4 - 10.4 mg/dL - - -   Mg 1.6 - 2.3 mg/dL - - -      Bone Health Latest Ref Rng & Units 2/5/2020 10/12/2016 12/30/2012   Phos 2.5 - 4.5 mg/dL - - 4.5   Vit D Def 20 - 75 ug/L 56 21 -     Heme Latest Ref Rng & Units 1/13/2021 10/14/2020 7/15/2020   WBC 4.0 - 11.0 10e9/L 3.4(L) 4.0 2.7(L)   WBC (external) 3.5 - 10.5 x10(9)/L - - -   Hgb 11.7 - 15.7 g/dL 12.5 12.5 11.9   Hgb (external) 12.0 - 15.5 g/dL - - -   Plt 150 - 450 10e9/L 229 225 270   Plt (external) 150 - 450 x10(9)/L - - -   ABSOLUTE NEUTROPHIL 1.6 - 8.3 10e9/L - - -   ABSOLUTE LYMPHOCYTES 0.8 - 5.3 10e9/L - - -   ABSOLUTE MONOCYTES 0.0 - 1.3 10e9/L - - -   ABSOLUTE EOSINOPHILS 0.0 - 0.7 10e9/L - - -   ABSOLUTE BASOPHILS 0.0 - 0.2 10e9/L - - -   ABS IMMATURE GRANULOCYTES 0 - 0.4 10e9/L - - -     Liver Latest Ref Rng & Units 4/22/2015 11/26/2014 6/3/2014   AP 40 - 150 U/L - 80 71   TBili 0.2 - 1.3 mg/dL - 0.3 0.6   ALT 0 - 50 U/L 26 25 23   AST 0 - 45 U/L - 20 19   Tot Protein 6.8 - 8.8 g/dL - 7.9 7.9   Albumin 3.4 - 5.0 g/dL - 3.4 4.9     Pancreas Latest Ref Rng & Units 9/16/2014 2/26/2014 11/13/2013   A1C 4.3 - 6.0 % 7.9(H) 6.7(H) 5.8        [facility] Txp Virology Latest Ref Rng & Units 6/21/2017 10/12/2016 3/12/2015   CMV IgG EU/mL - - -   CVM DNA Quant - - - -   CMV Quant <100 Copies/mL - - -   CMV QT Log <2.0 Log copies/mL - - -   BK Spec - Plasma Plasma Plasma   BK Res BKNEG copies/mL BK Virus DNA Not Detected BK Virus DNA Not Detected Test canceled by PCU/Clinic  [facility] LINK [phone] 3.12.15 KD  (A)   BK Log <2.7 Log copies/mL Not Calculated   The Real-Time quantitative BK Virus assay was developed and its performance   characteristics determined by the Infectious Diseases Diagnostic Laboratory at   the [facility] in [place], [place]. The   primers and probes for each analyte are Analyte Specific Reagents (ASRs)   manufactured by [employer].   ASRs are used in many laboratory tests necessary for standard medical care and   generally do not require U.S. Food and Drug  Administration approval. The FDA   has determined that such clearance or approval is not necessary.   This test is used for clinical purposes. It should not be regarded as   investigational or for research. This laboratory is certified under the   Clinical Laboratory Improvement Amendments of 1988 (CLIA-88) as qualified to   perform high complexity clinical laboratory testing.   Not Calculated   The Real-Time quantitative BK Virus assay was developed and its performance   characteristics determined by the Infectious Diseases Diagnostic Laboratory at   the [facility] in [place], [place]. The   primers and probes for each analyte are Analyte Specific Reagents (ASRs)   manufactured by [employer].   ASRs are used in many laboratory tests necessary for standard medical care and   generally do not require U.S. Food and Drug Administration approval. The FDA   has determined that such clearance or approval is not necessary.   This test is used for clinical purposes. It should not be regarded as   investigational or for research. This laboratory is certified under the   Clinical Laboratory Improvement Amendments of 1988 (CLIA-88) as qualified to   perform high complexity clinical laboratory testing.   Test canceled by [facility]Clinic  [facility] LINK [phone] 3.12.15. KD     EBV IgG - - - -   EBV VCA IGG ANTIBODY U/mL - - -   EBV VCA IGM ANTIBODY U/mL - - -   EBV DNA COPIES/ML <1000 Copies/mL - - -   EBV DNA LOG OF COPIES <3.0 Log copies/mL - - -   Hep B Core NEG - - -   Hep B Surf - - - -   HIV 1&2 NEG - - -        Recent Labs   Lab Test 07/15/20  0714 10/14/20  0717 01/13/21  0730   DOSTAC LAST DOSE 7:30PM ON 7/14/2020 LAST DOSE 645 PM 10/13 LAST DOSE 730 PM 01/12   TACROL 6.9 6.3 5.4     Recent Labs   Lab Test 07/11/18  0721 01/16/19  0736 04/17/19  0713   DOSMPA 07/10/2018 at 0730 pm 01/15/2019 at 0745 pm 7   MPACID 2.06 0.65* 0.82*   MPAG 27.8* 21.0* 31.6

## 2021-03-11 NOTE — LETTER
3/11/2021       RE: Awa Alexander  [address] [address] [facility] [place] [address]     Dear Colleague,    Thank you for referring your patient, Awa Alexander, to the [facility] [place] at [facility]. Please see a copy of my visit note below.    Awa is a 55 year old who is being evaluated via a billable telephone visit.      What phone number would you like to be contacted at? [phone]  How would you like to obtain your AVS? Mail a copy  Phone call duration: 10 minutes    Start 4:39  Stop 4:49    CHRONIC TRANSPLANT NEPHROLOGY VISIT    Assessment & Plan   # LDKT: Stable   - Baseline Cr ~ 1.2-1.4    - Proteinuria: Minimal (0.2-0.5 grams)   - Date DSA Last Checked: Jun/2017      Latest DSA: No   - BK Viremia: No   - Kidney Tx Biopsy: Sep 22, 2001; Result: No rejection. There are no glomerular, vascular or tubulointerstitial abnormalities     # Immunosuppression: tacrolimus (goal trough 4-6) &  mg po every day in Jan   - Changes: No.    Continue with intensive monitoring of immunosuppression for efficacy and toxicity.      # Infection Prophylaxis:   - PJP: Sulfa/TMP (Bactrim)    # Hypertension: Controlled, but low at times;  Goal BP: > 100, but < 130 systolic   - Changes: No, she has chronic low BP but denies lightheadedness or dizziness.    # Diabetes: controlled Last HbA1c: 5.5%   - Management as per primary care.    # Diarrhea: resolved after stopping gemfibrozil, stopped last July; she was also switched from MMF to AZA    # Anemia in Chronic Renal Disease: Hgb: Stable (13.6g/dL)     CHANCE: No   - Iron studies: Replete    # Mineral Bone Disorder:   - Vitamin D; level: Not checked recently, but was low last check        On Supplement: Yes 1000 international unit(s) qd  - Calcium; level: Normal        On Supplement: No      # Aneurysm Risk in PKD: Had MRA in 2006 that was normal    # Skin Cancer Risk:    - Discussed sun protection and  "recommend regular follow up with Dermatology.    # Medical Compliance: Yes    # Transplant History:  Etiology of Kidney Failure: Polycystic kidney disease (PKD)  Tx: LDKT  Transplant: 7/22/2011 (Kidney)  Donor Type: Living Donor Class: Standard Criteria Donor  Significant changes in immunosuppression: None  Significant transplant-related complications: None    Transplant Office Phone Number: [phone]    Assessment and plan was discussed with the patient and she voiced her understanding and agreement.    Return visit: No follow-ups on file.     Chief Complaint   Ms. Alexander is a 55 year old here for routine follow up.    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   She feels well overall and reports no new complaints. Energy level is good. Denies any fevers, chills, weight loss, night sweats. No nausea, vomiting, abdominal pain, diarrhea. No chest pain, sob, leg swelling. No recent illness or hospitalization. She plans to get the COVID vaccine tomorrow    Home -110s    Review of Systems   A comprehensive review of systems was obtained and negative, except as noted in the HPI or PMH.    Problem List   Patient Active Problem List   Diagnosis     PKD (polycystic kidney disease)     Ureteral stricture of right kidney transplant     Aftercare following organ transplant     Health Care Home     History of basal cell cancer     Immunosuppression (H)     Kidney replaced by transplant     HTN, kidney transplant related     Dyslipidemia     Type 2 diabetes, HbA1c goal < 7% (H)     Diabetes mellitus (H)     Vitamin D deficiency       Social History   Social History     Tobacco Use     Smoking status: Never Smoker     Smokeless tobacco: Never Used   Substance Use Topics     Alcohol use: No     Alcohol/week: 0.0 standard drinks     Comment: \"not anymore since the transplant\"     Drug use: No       Allergies   Allergies "   Allergen Reactions     Cefazolin Other (See Comments)     Patient tolerated ceftriaxone and ceftazidime during 1/13/2020 admission to [facility] - ceftriaxone was tolerated after possible reaction to cefazolin.  Pt describes rxn as facial swelling     Flurbiprofen Other (See Comments)     Can not take due to kidney transplant  Can not take due to kidney transplant       Nsaids      PN: LW Reaction: KIDNEY PROBLEM       Medications   Current Outpatient Medications   Medication Sig     azaTHIOprine (IMURAN) 50 MG tablet Take 3 tablets (150 mg) by mouth daily     OZEMPIC, 1 MG/DOSE, 2 MG/1.5ML pen Every monday     sulfamethoxazole-trimethoprim (BACTRIM) 400-80 MG tablet Take 1 tablet by mouth every other day     tacrolimus (GENERIC EQUIVALENT) 0.5 MG capsule Take 1 capsule (0.5 mg) by mouth every evening     tacrolimus (GENERIC EQUIVALENT) 1 MG capsule Take 1 capsule (1 mg) by mouth every morning     blood glucose ([employer] CONTOUR NEXT) test strip Use to test blood sugar three times daily or as directed. (Patient not taking: Reported on 1/30/2020)     cholecalciferol (VITAMIN  -D) 1000 UNITS capsule Take 5 capsules (5,000 Units) by mouth daily     dicyclomine (BENTYL) 10 MG capsule Take 1 capsule (10 mg) by mouth 2 times daily     gemfibrozil (LOPID) 600 MG tablet Take 600 mg by mouth     ORDER FOR DME Equipment being ordered: geronimo lancets     Sulfamethoxazole-Trimethoprim (SMZ-TMP DS PO)      No current facility-administered medications for this visit.      There are no discontinued medications.    Physical Exam   Vital Signs: /59   Deferred     Data     Renal Latest Ref Rng & Units 1/13/2021 10/14/2020 7/15/2020   Na 133 - 144 mmol/L 136 139 138   Na (external) 136 - 145 mmol/L - - -   K 3.4 - 5.3 mmol/L 4.9 4.9 4.1   K (external) 3.5 - 5.1 mmol/L - - -   Cl 94 - 109 mmol/L 108 110(H) 111(H)   Cl (external) 98 - 109 mmol/L - - -   CO2 20 - 32 mmol/L 23 23 21   CO2 (external) 20 - 29 mmol/L - - -   BUN  7 - 30 mg/dL 21 20 22   BUN (external) 7 - 26 mg/dL - - -   Cr 0.52 - 1.04 mg/dL 1.44(H) 1.26(H) 1.43(H)   Cr (external) mg/dL - - -   Glucose 70 - 99 mg/dL 142(H) 113(H) 102(H)   Glucose (external) 70 - 100 mg/dL - - -   Ca  8.5 - 10.1 mg/dL 9.3 9.3 9.3   Ca (external) 8.4 - 10.4 mg/dL - - -   Mg 1.6 - 2.3 mg/dL - - -     Bone Health Latest Ref Rng & Units 2/5/2020 10/12/2016 12/30/2012   Phos 2.5 - 4.5 mg/dL - - 4.5   Vit D Def 20 - 75 ug/L 56 21 -     Heme Latest Ref Rng & Units 1/13/2021 10/14/2020 7/15/2020   WBC 4.0 - 11.0 10e9/L 3.4(L) 4.0 2.7(L)   WBC (external) 3.5 - 10.5 x10(9)/L - - -   Hgb 11.7 - 15.7 g/dL 12.5 12.5 11.9   Hgb (external) 12.0 - 15.5 g/dL - - -   Plt 150 - 450 10e9/L 229 225 270   Plt (external) 150 - 450 x10(9)/L - - -   ABSOLUTE NEUTROPHIL 1.6 - 8.3 10e9/L - - -   ABSOLUTE LYMPHOCYTES 0.8 - 5.3 10e9/L - - -   ABSOLUTE MONOCYTES 0.0 - 1.3 10e9/L - - -   ABSOLUTE EOSINOPHILS 0.0 - 0.7 10e9/L - - -   ABSOLUTE BASOPHILS 0.0 - 0.2 10e9/L - - -   ABS IMMATURE GRANULOCYTES 0 - 0.4 10e9/L - - -     Liver Latest Ref Rng & Units 4/22/2015 11/26/2014 6/3/2014   AP 40 - 150 U/L - 80 71   TBili 0.2 - 1.3 mg/dL - 0.3 0.6   ALT 0 - 50 U/L 26 25 23   AST 0 - 45 U/L - 20 19   Tot Protein 6.8 - 8.8 g/dL - 7.9 7.9   Albumin 3.4 - 5.0 g/dL - 3.4 4.9     Pancreas Latest Ref Rng & Units 9/16/2014 2/26/2014 11/13/2013   A1C 4.3 - 6.0 % 7.9(H) 6.7(H) 5.8        [facility] Txp Virology Latest Ref Rng & Units 6/21/2017 10/12/2016 3/12/2015   CMV IgG EU/mL - - -   CVM DNA Quant - - - -   CMV Quant <100 Copies/mL - - -   CMV QT Log <2.0 Log copies/mL - - -   BK Spec - Plasma Plasma Plasma   BK Res BKNEG copies/mL BK Virus DNA Not Detected BK Virus DNA Not Detected Test canceled by PCU/Clinic  [facility] LINK [phone] 3.12.15 KD  (A)   BK Log <2.7 Log copies/mL Not Calculated   The Real-Time quantitative BK Virus assay was developed and its performance   characteristics determined by the Infectious Diseases Diagnostic  Laboratory at   the [facility] in [place], [place]. The   primers and probes for each analyte are Analyte Specific Reagents (ASRs)   manufactured by [employer].   ASRs are used in many laboratory tests necessary for standard medical care and   generally do not require U.S. Food and Drug Administration approval. The FDA   has determined that such clearance or approval is not necessary.   This test is used for clinical purposes. It should not be regarded as   investigational or for research. This laboratory is certified under the   Clinical Laboratory Improvement Amendments of 1988 (CLIA-88) as qualified to   perform high complexity clinical laboratory testing.   Not Calculated   The Real-Time quantitative BK Virus assay was developed and its performance   characteristics determined by the Infectious Diseases Diagnostic Laboratory at   the [facility] in [place], [place]. The   primers and probes for each analyte are Analyte Specific Reagents (ASRs)   manufactured by [employer].   ASRs are used in many laboratory tests necessary for standard medical care and   generally do not require U.S. Food and Drug Administration approval. The FDA   has determined that such clearance or approval is not necessary.   This test is used for clinical purposes. It should not be regarded as   investigational or for research. This laboratory is certified under the   Clinical Laboratory Improvement Amendments of 1988 (CLIA-88) as qualified to   perform high complexity clinical laboratory testing.   Test canceled by [facility]  [facility] LINK [phone] 3.12.15. KD     EBV IgG - - - -   EBV VCA IGG ANTIBODY U/mL - - -   EBV VCA IGM ANTIBODY U/mL - - -   EBV DNA COPIES/ML <1000 Copies/mL - - -   EBV DNA LOG OF COPIES <3.0 Log copies/mL - - -   Hep B Core NEG - - -   Hep B Surf - - - -   HIV 1&2 NEG - - -        Recent Labs   Lab Test 07/15/20  0714 10/14/20  0717 01/13/21  0717    DOSTAC LAST DOSE 7:30PM ON 7/14/2020 LAST DOSE 645 PM 10/13 LAST DOSE 730 PM 01/12   TACROL 6.9 6.3 5.4     Recent Labs   Lab Test 07/11/18  0721 01/16/19  0736 04/17/19  0713   DOSMPA 07/10/2018 at 0730 pm 01/15/2019 at 0745 pm 7   MPACID 2.06 0.65* 0.82*   MPAG 27.8* 21.0* 31.6       Again, thank you for allowing me to participate in the care of your patient.      Sincerely,    Eleno Ny MD

## 2021-03-12 ENCOUNTER — IMMUNIZATION (OUTPATIENT)
Dept: PEDIATRICS | Facility: CLINIC | Age: 56
End: 2021-03-12
Payer: COMMERCIAL

## 2021-03-12 PROCEDURE — 91300 PR COVID VAC PFIZER DIL RECON 30 MCG/0.3 ML IM: CPT

## 2021-03-12 PROCEDURE — 0001A PR COVID VAC PFIZER DIL RECON 30 MCG/0.3 ML IM: CPT

## 2021-04-02 ENCOUNTER — IMMUNIZATION (OUTPATIENT)
Dept: PEDIATRICS | Facility: CLINIC | Age: 56
End: 2021-04-02
Attending: INTERNAL MEDICINE
Payer: COMMERCIAL

## 2021-04-02 PROCEDURE — 91300 PR COVID VAC PFIZER DIL RECON 30 MCG/0.3 ML IM: CPT

## 2021-04-02 PROCEDURE — 0002A PR COVID VAC PFIZER DIL RECON 30 MCG/0.3 ML IM: CPT

## 2021-04-16 ENCOUNTER — TELEPHONE (OUTPATIENT)
Dept: LAB | Facility: CLINIC | Age: 56
End: 2021-04-16

## 2021-04-16 DIAGNOSIS — Z48.298 AFTERCARE FOLLOWING ORGAN TRANSPLANT: Primary | ICD-10-CM

## 2021-04-16 DIAGNOSIS — Z79.899 ENCOUNTER FOR LONG-TERM CURRENT USE OF MEDICATION: ICD-10-CM

## 2021-04-16 DIAGNOSIS — Z94.83 PANCREAS REPLACED BY TRANSPLANT (H): ICD-10-CM

## 2021-04-16 NOTE — TELEPHONE ENCOUNTER
"Patient is scheduled to be seen in our lab 4/21/2021.  Appointment notes indicate \"LABS FROM THE U\".  NO current FUTURE or STANDING ORDERS have been placed.  Please place current FUTURE or STANDING orders as needed.      Note that orders have a maximum duration of one (1) year.    Thank you.  Please call if you have any questions.     "

## 2021-04-21 DIAGNOSIS — Z79.899 ENCOUNTER FOR LONG-TERM CURRENT USE OF MEDICATION: ICD-10-CM

## 2021-04-21 DIAGNOSIS — Z94.83 PANCREAS REPLACED BY TRANSPLANT (H): ICD-10-CM

## 2021-04-21 DIAGNOSIS — Z48.298 AFTERCARE FOLLOWING ORGAN TRANSPLANT: ICD-10-CM

## 2021-04-21 LAB
ANION GAP SERPL CALCULATED.3IONS-SCNC: 4 MMOL/L (ref 3–14)
BUN SERPL-MCNC: 18 MG/DL (ref 7–30)
CALCIUM SERPL-MCNC: 9.3 MG/DL (ref 8.5–10.1)
CHLORIDE SERPL-SCNC: 105 MMOL/L (ref 94–109)
CO2 SERPL-SCNC: 25 MMOL/L (ref 20–32)
CREAT SERPL-MCNC: 1.4 MG/DL (ref 0.52–1.04)
ERYTHROCYTE [DISTWIDTH] IN BLOOD BY AUTOMATED COUNT: 14.6 % (ref 10–15)
GFR SERPL CREATININE-BSD FRML MDRD: 42 ML/MIN/{1.73_M2}
GLUCOSE SERPL-MCNC: 133 MG/DL (ref 70–99)
HCT VFR BLD AUTO: 33 % (ref 35–47)
HGB BLD-MCNC: 11.6 G/DL (ref 11.7–15.7)
MCH RBC QN AUTO: 34.5 PG (ref 26.5–33)
MCHC RBC AUTO-ENTMCNC: 35.2 G/DL (ref 31.5–36.5)
MCV RBC AUTO: 98 FL (ref 78–100)
PLATELET # BLD AUTO: 222 10E9/L (ref 150–450)
POTASSIUM SERPL-SCNC: 4.6 MMOL/L (ref 3.4–5.3)
RBC # BLD AUTO: 3.36 10E12/L (ref 3.8–5.2)
SODIUM SERPL-SCNC: 134 MMOL/L (ref 133–144)
TACROLIMUS BLD-MCNC: 6.9 UG/L (ref 5–15)
TME LAST DOSE: NORMAL H
WBC # BLD AUTO: 3.8 10E9/L (ref 4–11)

## 2021-04-21 PROCEDURE — 36415 COLL VENOUS BLD VENIPUNCTURE: CPT | Performed by: INTERNAL MEDICINE

## 2021-04-21 PROCEDURE — 80197 ASSAY OF TACROLIMUS: CPT | Performed by: INTERNAL MEDICINE

## 2021-04-21 PROCEDURE — 85027 COMPLETE CBC AUTOMATED: CPT | Performed by: INTERNAL MEDICINE

## 2021-04-21 PROCEDURE — 80048 BASIC METABOLIC PNL TOTAL CA: CPT | Performed by: INTERNAL MEDICINE

## 2021-05-09 ENCOUNTER — HEALTH MAINTENANCE LETTER (OUTPATIENT)
Age: 56
End: 2021-05-09

## 2021-05-21 ENCOUNTER — TELEPHONE (OUTPATIENT)
Dept: TRANSPLANT | Facility: CLINIC | Age: 56
End: 2021-05-21

## 2021-05-21 NOTE — TELEPHONE ENCOUNTER
We have been advised by infectious diseases to not test for COVID antibodies to look for effectiveness of vaccines because this does not test for T cell response and may not give us the whole story of ability of vaccines to protect against COVID.    At this time, our recommendation is to continue taking the precautions that you were taking prior to vaccination (mask, socially distance, hand washing, etc.), as well as encouraging all those who are frequently around  you to get vaccinated as this will protect you.  We are continuing to follow the data and it is possible in the coming months we will recommend a booster or potentially repeating the vaccine series.  We will send out further information as it becomes available.      Appreciates call.

## 2021-06-23 NOTE — TELEPHONE ENCOUNTER
----- Message from Racquel Dunn CNP sent at 1/14/2019  6:10 PM CST -----  Please notify the patient that her A1C remains elevated.  I would like to add on glipizide 5 mg daily.  She should take this 30 minutes before a meal.  This medication can cause a drop in blood sugar, so I recommend she monitor her blood sugars closely.  I recommend a 3 month recheck.  Thanks.   Patient has lab appointment 10/17/2018 at [place].  IF Mycophenolic Acid is needed as a test, open order has  and a new one needs to be placed. Please DO NOT EXTEND. Please place an order for mycophenolic acid if needed. Thank you!

## 2021-07-14 ENCOUNTER — LAB (OUTPATIENT)
Dept: LAB | Facility: CLINIC | Age: 56
End: 2021-07-14
Payer: COMMERCIAL

## 2021-07-14 DIAGNOSIS — Z79.899 ENCOUNTER FOR LONG-TERM CURRENT USE OF MEDICATION: ICD-10-CM

## 2021-07-14 DIAGNOSIS — Z94.83 PANCREAS REPLACED BY TRANSPLANT (H): ICD-10-CM

## 2021-07-14 DIAGNOSIS — Z48.298 AFTERCARE FOLLOWING ORGAN TRANSPLANT: ICD-10-CM

## 2021-07-14 LAB
ANION GAP SERPL CALCULATED.3IONS-SCNC: 5 MMOL/L (ref 3–14)
BUN SERPL-MCNC: 30 MG/DL (ref 7–30)
CALCIUM SERPL-MCNC: 9.7 MG/DL (ref 8.5–10.1)
CHLORIDE BLD-SCNC: 109 MMOL/L
CO2 SERPL-SCNC: 22 MMOL/L (ref 20–32)
CREAT SERPL-MCNC: 1.77 MG/DL
ERYTHROCYTE [DISTWIDTH] IN BLOOD BY AUTOMATED COUNT: 14 % (ref 10–15)
GFR SERPL CREATININE-BSD FRML MDRD: 32 ML/MIN/1.73M2
GLUCOSE BLD-MCNC: 113 MG/DL (ref 70–99)
HCT VFR BLD AUTO: 32 % (ref 35–47)
HGB BLD-MCNC: 11.1 G/DL (ref 11.7–15.7)
MCH RBC QN AUTO: 34.6 PG (ref 26.5–33)
MCHC RBC AUTO-ENTMCNC: 34.7 G/DL (ref 31.5–36.5)
MCV RBC AUTO: 100 FL (ref 78–100)
PLATELET # BLD AUTO: 232 10E3/UL (ref 150–450)
POTASSIUM BLD-SCNC: 5.1 MMOL/L (ref 3.4–5.3)
RBC # BLD AUTO: 3.21 10E6/UL (ref 3.8–5.2)
SODIUM SERPL-SCNC: 136 MMOL/L (ref 133–144)
TACROLIMUS BLD-MCNC: 6.5 UG/L (ref 5–15)
TME LAST DOSE: NORMAL H
TME LAST DOSE: NORMAL H
WBC # BLD AUTO: 2.7 10E3/UL (ref 4–11)

## 2021-07-14 PROCEDURE — 80197 ASSAY OF TACROLIMUS: CPT

## 2021-07-14 PROCEDURE — 85027 COMPLETE CBC AUTOMATED: CPT

## 2021-07-14 PROCEDURE — 36415 COLL VENOUS BLD VENIPUNCTURE: CPT

## 2021-07-14 PROCEDURE — 80048 BASIC METABOLIC PNL TOTAL CA: CPT

## 2021-07-20 ENCOUNTER — TRANSFERRED RECORDS (OUTPATIENT)
Dept: HEALTH INFORMATION MANAGEMENT | Facility: CLINIC | Age: 56
End: 2021-07-20

## 2021-07-20 LAB
CHOLESTEROL (EXTERNAL): 123 MG/DL (ref 100–199)
CREATININE (EXTERNAL): 1.57 MG/DL (ref 0.57–1.11)
GFR ESTIMATED (EXTERNAL): 34 ML/MIN/1.73M2
GFR ESTIMATED (IF AFRICAN AMERICAN) (EXTERNAL): 41 ML/MIN/1.73M2
GLUCOSE (EXTERNAL): 120 MG/DL (ref 65–100)
HBA1C MFR BLD: 5.8 %
HDLC SERPL-MCNC: 22 MG/DL
LDL CHOLESTEROL (EXTERNAL): 45 MG/DL
NON HDL CHOLESTEROL (EXTERNAL): 101 MG/DL
PHQ9 SCORE: 3
POTASSIUM (EXTERNAL): 4.7 MMOL/L (ref 3.5–5)
TRIGLYCERIDES (EXTERNAL): 281 MG/DL

## 2021-08-29 ENCOUNTER — HEALTH MAINTENANCE LETTER (OUTPATIENT)
Age: 56
End: 2021-08-29

## 2021-09-10 ENCOUNTER — IMMUNIZATION (OUTPATIENT)
Dept: NURSING | Facility: CLINIC | Age: 56
End: 2021-09-10
Payer: COMMERCIAL

## 2021-09-10 PROCEDURE — 91300 PR COVID VAC PFIZER DIL RECON 30 MCG/0.3 ML IM: CPT

## 2021-09-10 PROCEDURE — 0003A PR COVID VAC PFIZER DIL RECON 30 MCG/0.3 ML IM: CPT

## 2021-10-13 ENCOUNTER — LAB (OUTPATIENT)
Dept: LAB | Facility: CLINIC | Age: 56
End: 2021-10-13
Payer: COMMERCIAL

## 2021-10-13 DIAGNOSIS — Z94.83 PANCREAS REPLACED BY TRANSPLANT (H): ICD-10-CM

## 2021-10-13 DIAGNOSIS — Z48.298 AFTERCARE FOLLOWING ORGAN TRANSPLANT: ICD-10-CM

## 2021-10-13 DIAGNOSIS — Z79.899 ENCOUNTER FOR LONG-TERM CURRENT USE OF MEDICATION: ICD-10-CM

## 2021-10-13 LAB
ANION GAP SERPL CALCULATED.3IONS-SCNC: 5 MMOL/L (ref 3–14)
BUN SERPL-MCNC: 26 MG/DL (ref 7–30)
CALCIUM SERPL-MCNC: 9.1 MG/DL (ref 8.5–10.1)
CHLORIDE BLD-SCNC: 107 MMOL/L (ref 94–109)
CO2 SERPL-SCNC: 22 MMOL/L (ref 20–32)
CREAT SERPL-MCNC: 1.6 MG/DL (ref 0.52–1.04)
ERYTHROCYTE [DISTWIDTH] IN BLOOD BY AUTOMATED COUNT: 13.7 % (ref 10–15)
GFR SERPL CREATININE-BSD FRML MDRD: 36 ML/MIN/1.73M2
GLUCOSE BLD-MCNC: 109 MG/DL (ref 70–99)
HCT VFR BLD AUTO: 32.5 % (ref 35–47)
HGB BLD-MCNC: 10.8 G/DL (ref 11.7–15.7)
MCH RBC QN AUTO: 34.4 PG (ref 26.5–33)
MCHC RBC AUTO-ENTMCNC: 33.2 G/DL (ref 31.5–36.5)
MCV RBC AUTO: 104 FL (ref 78–100)
PLATELET # BLD AUTO: 252 10E3/UL (ref 150–450)
POTASSIUM BLD-SCNC: 4.4 MMOL/L (ref 3.4–5.3)
RBC # BLD AUTO: 3.14 10E6/UL (ref 3.8–5.2)
SODIUM SERPL-SCNC: 134 MMOL/L (ref 133–144)
TACROLIMUS BLD-MCNC: 6.1 UG/L (ref 5–15)
TME LAST DOSE: NORMAL H
TME LAST DOSE: NORMAL H
WBC # BLD AUTO: 3.5 10E3/UL (ref 4–11)

## 2021-10-13 PROCEDURE — 80048 BASIC METABOLIC PNL TOTAL CA: CPT

## 2021-10-13 PROCEDURE — 80197 ASSAY OF TACROLIMUS: CPT

## 2021-10-13 PROCEDURE — 85027 COMPLETE CBC AUTOMATED: CPT

## 2021-10-13 PROCEDURE — 36415 COLL VENOUS BLD VENIPUNCTURE: CPT

## 2021-10-24 ENCOUNTER — HEALTH MAINTENANCE LETTER (OUTPATIENT)
Age: 56
End: 2021-10-24

## 2021-11-30 DIAGNOSIS — Z94.0 KIDNEY TRANSPLANTED: Primary | ICD-10-CM

## 2021-11-30 DIAGNOSIS — R79.89 LOW VITAMIN D LEVEL: ICD-10-CM

## 2021-11-30 DIAGNOSIS — D84.9 IMMUNOSUPPRESSED STATUS (H): ICD-10-CM

## 2021-11-30 DIAGNOSIS — Z94.0 S/P KIDNEY TRANSPLANT: ICD-10-CM

## 2021-11-30 DIAGNOSIS — Z79.899 ENCOUNTER FOR LONG-TERM CURRENT USE OF MEDICATION: ICD-10-CM

## 2021-11-30 DIAGNOSIS — Z94.0 KIDNEY REPLACED BY TRANSPLANT: ICD-10-CM

## 2021-11-30 RX ORDER — AZATHIOPRINE 50 MG/1
150 TABLET ORAL DAILY
Qty: 270 TABLET | Refills: 3 | Status: SHIPPED | OUTPATIENT
Start: 2021-11-30 | End: 2022-01-26

## 2021-11-30 RX ORDER — SULFAMETHOXAZOLE AND TRIMETHOPRIM 400; 80 MG/1; MG/1
1 TABLET ORAL EVERY OTHER DAY
Qty: 45 TABLET | Refills: 3 | Status: SHIPPED | OUTPATIENT
Start: 2021-11-30

## 2021-12-19 ENCOUNTER — HEALTH MAINTENANCE LETTER (OUTPATIENT)
Age: 56
End: 2021-12-19

## 2021-12-23 DIAGNOSIS — R79.89 LOW VITAMIN D LEVEL: ICD-10-CM

## 2021-12-23 DIAGNOSIS — Z79.899 ENCOUNTER FOR LONG-TERM CURRENT USE OF MEDICATION: ICD-10-CM

## 2021-12-23 DIAGNOSIS — Z94.0 KIDNEY TRANSPLANTED: ICD-10-CM

## 2021-12-23 DIAGNOSIS — Z94.0 S/P KIDNEY TRANSPLANT: ICD-10-CM

## 2021-12-23 DIAGNOSIS — Z94.0 KIDNEY REPLACED BY TRANSPLANT: ICD-10-CM

## 2021-12-23 DIAGNOSIS — D84.9 IMMUNOSUPPRESSED STATUS (H): ICD-10-CM

## 2021-12-23 RX ORDER — TACROLIMUS 0.5 MG/1
0.5 CAPSULE ORAL EVERY EVENING
Qty: 90 CAPSULE | Refills: 3 | Status: SHIPPED | OUTPATIENT
Start: 2021-12-23 | End: 2022-01-26

## 2021-12-23 RX ORDER — TACROLIMUS 1 MG/1
1 CAPSULE ORAL EVERY MORNING
Qty: 90 CAPSULE | Refills: 3 | Status: SHIPPED | OUTPATIENT
Start: 2021-12-23 | End: 2022-01-26 | Stop reason: ALTCHOICE

## 2021-12-23 NOTE — TELEPHONE ENCOUNTER
Provider Call: Medication Refill  Route to LPN  Pharmacy Name: [facility]   Pharmacy Location: [place] that does not need to go thru [facility]   Name of Medication: Tacro Dose: 0.5 and 1 mg - 90 day supply   When will the patient be out of this medication?: Greater than 3 days (Route standard priority)  Callback needed? No

## 2022-01-12 ENCOUNTER — LAB (OUTPATIENT)
Dept: LAB | Facility: CLINIC | Age: 57
End: 2022-01-12
Attending: INTERNAL MEDICINE
Payer: COMMERCIAL

## 2022-01-12 DIAGNOSIS — Z94.83 PANCREAS REPLACED BY TRANSPLANT (H): ICD-10-CM

## 2022-01-12 DIAGNOSIS — Z79.899 ENCOUNTER FOR LONG-TERM CURRENT USE OF MEDICATION: ICD-10-CM

## 2022-01-12 DIAGNOSIS — Z48.298 AFTERCARE FOLLOWING ORGAN TRANSPLANT: ICD-10-CM

## 2022-01-12 DIAGNOSIS — Z94.0 KIDNEY TRANSPLANTED: Primary | ICD-10-CM

## 2022-01-12 LAB
ANION GAP SERPL CALCULATED.3IONS-SCNC: 5 MMOL/L (ref 3–14)
BUN SERPL-MCNC: 32 MG/DL (ref 7–30)
CALCIUM SERPL-MCNC: 9 MG/DL (ref 8.5–10.1)
CHLORIDE BLD-SCNC: 110 MMOL/L (ref 94–109)
CO2 SERPL-SCNC: 18 MMOL/L (ref 20–32)
CREAT SERPL-MCNC: 1.67 MG/DL (ref 0.52–1.04)
CREAT UR-MCNC: 115 MG/DL
ERYTHROCYTE [DISTWIDTH] IN BLOOD BY AUTOMATED COUNT: 14.2 % (ref 10–15)
GFR SERPL CREATININE-BSD FRML MDRD: 35 ML/MIN/1.73M2
GLUCOSE BLD-MCNC: 120 MG/DL (ref 70–99)
HCT VFR BLD AUTO: 31.1 % (ref 35–47)
HGB BLD-MCNC: 10.4 G/DL (ref 11.7–15.7)
MCH RBC QN AUTO: 33.5 PG (ref 26.5–33)
MCHC RBC AUTO-ENTMCNC: 33.4 G/DL (ref 31.5–36.5)
MCV RBC AUTO: 100 FL (ref 78–100)
PLATELET # BLD AUTO: 242 10E3/UL (ref 150–450)
POTASSIUM BLD-SCNC: 4.9 MMOL/L (ref 3.4–5.3)
PROT UR-MCNC: 0.59 G/L
PROT/CREAT 24H UR: 0.51 G/G CR (ref 0–0.2)
RBC # BLD AUTO: 3.1 10E6/UL (ref 3.8–5.2)
SODIUM SERPL-SCNC: 133 MMOL/L (ref 133–144)
TACROLIMUS BLD-MCNC: 6.6 UG/L (ref 5–15)
TME LAST DOSE: NORMAL H
TME LAST DOSE: NORMAL H
WBC # BLD AUTO: 3.4 10E3/UL (ref 4–11)

## 2022-01-12 PROCEDURE — 85027 COMPLETE CBC AUTOMATED: CPT

## 2022-01-12 PROCEDURE — 80048 BASIC METABOLIC PNL TOTAL CA: CPT

## 2022-01-12 PROCEDURE — 36415 COLL VENOUS BLD VENIPUNCTURE: CPT

## 2022-01-12 PROCEDURE — 80197 ASSAY OF TACROLIMUS: CPT

## 2022-01-12 PROCEDURE — 84156 ASSAY OF PROTEIN URINE: CPT

## 2022-01-13 ENCOUNTER — TELEPHONE (OUTPATIENT)
Dept: TRANSPLANT | Facility: CLINIC | Age: 57
End: 2022-01-13
Payer: COMMERCIAL

## 2022-01-13 DIAGNOSIS — D64.9 ANEMIA: ICD-10-CM

## 2022-01-13 DIAGNOSIS — Z48.298 AFTERCARE FOLLOWING ORGAN TRANSPLANT: ICD-10-CM

## 2022-01-13 DIAGNOSIS — E87.20 METABOLIC ACIDOSIS: Primary | ICD-10-CM

## 2022-01-13 DIAGNOSIS — R19.7 DIARRHEA: ICD-10-CM

## 2022-01-13 DIAGNOSIS — D84.9 IMMUNOSUPPRESSED STATUS (H): ICD-10-CM

## 2022-01-13 DIAGNOSIS — Z94.0 KIDNEY TRANSPLANTED: ICD-10-CM

## 2022-01-13 NOTE — TELEPHONE ENCOUNTER
"ISSUE:   Tacrolimus IR level 6.6 on 1/12/22 0720, goal 4-6, dose 1 mg AM/0.5 mg PM.  Creatinine 1.67, baseline 1.2-1.4  Bicarbonate 18, previously within normal limits  Hgb trending down.    PLAN:   Please call patient and confirm this was an accurate 12-hour trough. Verify Tacrolimus IR dose 1 mg AM/0.5 mg PM. Confirm no new medications or illness. Confirm no missed doses. If accurate trough and accurate dose, decrease Tacrolimus IR dose to 0.5 mg BID and repeat labs in 1 week.    OUTCOME:   Spoke with patient, they confirm accurate trough level and current dose 1 mg AM/0.5 mg PM. Patient confirmed dose change to 0.5 mg BID and to repeat labs in 1 week. Orders sent to preferred pharmacy for dose change and lab for repeat labs. Patient voiced understanding of plan.     States she started herself on an OTC \"Heart health\" Potassium supplement because she was getting leg cramps at night and having Diarrhea 3-5 times per day. If she eats, it goes thru her. Then won't go 5 days so takes small laxative. Colonosopy 2 years ago. Drinking lots of water. Fatigued. Add iron panel for trending down hgb/refer to anemia services? Check EBV PCR (fatigue), CMV PCR and stool studies (diarrhea), magnesium level (leg cramps)? Start sodium bicarbonate 650 mg BID for CO2 18? Message to Dr. Levi.    Alexia Aragon, RN, BSN  Solid Organ Transplant, Post Kidney and Pancreas  Transplant Care Coordinator  [phone]     "

## 2022-01-14 ENCOUNTER — MYC MEDICAL ADVICE (OUTPATIENT)
Dept: TRANSPLANT | Facility: CLINIC | Age: 57
End: 2022-01-14
Payer: COMMERCIAL

## 2022-01-14 RX ORDER — SODIUM BICARBONATE 650 MG/1
650 TABLET ORAL 2 TIMES DAILY
Qty: 180 TABLET | Refills: 3 | Status: SHIPPED | OUTPATIENT
Start: 2022-01-14 | End: 2022-09-03

## 2022-01-14 NOTE — TELEPHONE ENCOUNTER
"Message  Received: Yesterday  Lino Levi MD Blaisdell, Christin Rebecca, RN  Caller: Unspecified (Yesterday,  4:43 PM)  I like the plan.  Maybe have her talk with Brandon about any OTC supplement.             Previous Messages       ----- Message -----   From: Alexia Aragon, JAIRO   Sent: 1/13/2022   5:03 PM CST   To: Lino Levi MD, *     ----- Message from Alexia Aragon RN sent at 1/13/2022  5:03 PM CST -----   Dr. Levi, States she started herself on an OTC \"Heart health\" Potassium supplement because she was getting leg cramps at night and having Diarrhea 3-5 times per day. If she eats, it goes thru her. Then won't go 5 days so takes small laxative. Colonosopy 2 years ago. Drinking lots of water. Fatigued. Add iron panel for trending down hgb/refer to anemia services? Check EBV PCR (fatigue), CMV PCR and stool studies (diarrhea), magnesium level (leg cramps)? Start sodium bicarbonate 650 mg BID for CO2 18?     OUTCOME: MyChart message sent to Awa. She already is aware she needs to set up lab appointment and we had discussed sodium bicarbonate supplement due to metabolic acidosis caused from diarrhea; Prescription sent to pharmacy on file: [facility]    Alexia Aragon RN, BSN  Solid Organ Transplant, Post Kidney and Pancreas  Transplant Care Coordinator  [phone]     "

## 2022-01-17 NOTE — TELEPHONE ENCOUNTER
Spoke with Awa, regarding plan. She states insurance declined sodium bicarbonate because it could be purchased over the counter. Awa will go to local pharmacy and ask pharmacist to help her locate in the store for purchase. She has lab appt scheduled on Wednesday morning. Lab orders confirmed in Epic under Dr. Levi.

## 2022-01-19 ENCOUNTER — LAB (OUTPATIENT)
Dept: LAB | Facility: CLINIC | Age: 57
End: 2022-01-19
Payer: COMMERCIAL

## 2022-01-19 DIAGNOSIS — D84.9 IMMUNOSUPPRESSED STATUS (H): ICD-10-CM

## 2022-01-19 DIAGNOSIS — Z48.298 AFTERCARE FOLLOWING ORGAN TRANSPLANT: ICD-10-CM

## 2022-01-19 DIAGNOSIS — Z94.0 KIDNEY TRANSPLANTED: ICD-10-CM

## 2022-01-19 DIAGNOSIS — R19.7 DIARRHEA: ICD-10-CM

## 2022-01-19 DIAGNOSIS — D64.9 ANEMIA: ICD-10-CM

## 2022-01-19 LAB
ANION GAP SERPL CALCULATED.3IONS-SCNC: 3 MMOL/L (ref 3–14)
BUN SERPL-MCNC: 28 MG/DL (ref 7–30)
C DIFF TOX B STL QL: NEGATIVE
CALCIUM SERPL-MCNC: 8.8 MG/DL (ref 8.5–10.1)
CHLORIDE BLD-SCNC: 108 MMOL/L (ref 94–109)
CMV DNA SPEC NAA+PROBE-ACNC: NOT DETECTED IU/ML
CO2 SERPL-SCNC: 22 MMOL/L (ref 20–32)
CREAT SERPL-MCNC: 1.75 MG/DL (ref 0.52–1.04)
ERYTHROCYTE [DISTWIDTH] IN BLOOD BY AUTOMATED COUNT: 14.4 % (ref 10–15)
FERRITIN SERPL-MCNC: 143 NG/ML (ref 8–252)
GFR SERPL CREATININE-BSD FRML MDRD: 33 ML/MIN/1.73M2
GLUCOSE BLD-MCNC: 129 MG/DL (ref 70–99)
HCT VFR BLD AUTO: 32.3 % (ref 35–47)
HGB BLD-MCNC: 10.8 G/DL (ref 11.7–15.7)
IRON SATN MFR SERPL: 21 % (ref 15–46)
IRON SERPL-MCNC: 64 UG/DL (ref 35–180)
MAGNESIUM SERPL-MCNC: 1.7 MG/DL (ref 1.6–2.3)
MCH RBC QN AUTO: 34 PG (ref 26.5–33)
MCHC RBC AUTO-ENTMCNC: 33.4 G/DL (ref 31.5–36.5)
MCV RBC AUTO: 102 FL (ref 78–100)
PLATELET # BLD AUTO: 245 10E3/UL (ref 150–450)
POTASSIUM BLD-SCNC: 4.7 MMOL/L (ref 3.4–5.3)
RBC # BLD AUTO: 3.18 10E6/UL (ref 3.8–5.2)
SODIUM SERPL-SCNC: 133 MMOL/L (ref 133–144)
TACROLIMUS BLD-MCNC: 5.2 UG/L (ref 5–15)
TIBC SERPL-MCNC: 303 UG/DL (ref 240–430)
TME LAST DOSE: NORMAL H
TME LAST DOSE: NORMAL H
WBC # BLD AUTO: 3.8 10E3/UL (ref 4–11)

## 2022-01-19 PROCEDURE — 83735 ASSAY OF MAGNESIUM: CPT

## 2022-01-19 PROCEDURE — 80048 BASIC METABOLIC PNL TOTAL CA: CPT

## 2022-01-19 PROCEDURE — 82728 ASSAY OF FERRITIN: CPT

## 2022-01-19 PROCEDURE — 83550 IRON BINDING TEST: CPT

## 2022-01-19 PROCEDURE — 87799 DETECT AGENT NOS DNA QUANT: CPT

## 2022-01-19 PROCEDURE — 36415 COLL VENOUS BLD VENIPUNCTURE: CPT

## 2022-01-19 PROCEDURE — 85027 COMPLETE CBC AUTOMATED: CPT

## 2022-01-19 PROCEDURE — 87506 IADNA-DNA/RNA PROBE TQ 6-11: CPT

## 2022-01-19 PROCEDURE — 87493 C DIFF AMPLIFIED PROBE: CPT | Mod: 59

## 2022-01-19 PROCEDURE — 80197 ASSAY OF TACROLIMUS: CPT

## 2022-01-20 ENCOUNTER — MYC MEDICAL ADVICE (OUTPATIENT)
Dept: TRANSPLANT | Facility: CLINIC | Age: 57
End: 2022-01-20
Payer: COMMERCIAL

## 2022-01-20 NOTE — TELEPHONE ENCOUNTER
Message  Received: Today  Lino Levi MD Blaisdell, Christin Rebecca, RN  Ozempic can cause diarrhea.  She should be on fiber (such as Metamucil or Benefiber) and would recommend trying probiotics.  Recommend she follow up with her PCP and/or GI.  Do not feel this is likely transplant related.     Jakob             Previous Messages       ----- Message -----   From: Alexia Aragon, JAIRO   Sent: 1/20/2022   1:17 PM CST   To: Lino Levi MD     Nothing on labs to explain diarrhea which she has had for years (and transitioned off Cellcept). Creatinine elevated likely due to fluid loss but she is hydrating well, unless you want to offer a liter of NS IV? Next appt isn't till March; do you want to see her sooner in telehealth clinic (3 weeks) or scheduled with different nephrologist? Or have her follow up with GI?     OUTCOME: Mychart message sent to pt with Dr. Levi's recommendations.

## 2022-01-21 ENCOUNTER — TELEPHONE (OUTPATIENT)
Dept: TRANSPLANT | Facility: CLINIC | Age: 57
End: 2022-01-21
Payer: COMMERCIAL

## 2022-01-21 DIAGNOSIS — Z48.298 AFTERCARE FOLLOWING ORGAN TRANSPLANT: ICD-10-CM

## 2022-01-21 DIAGNOSIS — D84.9 IMMUNOSUPPRESSED STATUS (H): ICD-10-CM

## 2022-01-21 DIAGNOSIS — Z94.0 KIDNEY TRANSPLANTED: ICD-10-CM

## 2022-01-21 DIAGNOSIS — Z94.0 S/P KIDNEY TRANSPLANT: ICD-10-CM

## 2022-01-21 DIAGNOSIS — Z79.899 ENCOUNTER FOR LONG-TERM CURRENT USE OF MEDICATION: Primary | ICD-10-CM

## 2022-01-21 DIAGNOSIS — Z94.0 KIDNEY REPLACED BY TRANSPLANT: ICD-10-CM

## 2022-01-21 DIAGNOSIS — R79.89 LOW VITAMIN D LEVEL: ICD-10-CM

## 2022-01-21 DIAGNOSIS — B27.90 EBV INFECTION: ICD-10-CM

## 2022-01-21 LAB
EBV DNA COPIES/ML, INSTRUMENT: ABNORMAL COPIES/ML
EBV DNA SPEC NAA+PROBE-LOG#: 5.9 {LOG_COPIES}/ML

## 2022-01-21 NOTE — TELEPHONE ENCOUNTER
ISSUE:  EBV level >800,000.    PLAN:  Assess for weight loss, swollen / painful lymph nodes, night sweats.     Lino Levi MD  New EBV viremia.  Would recommend obtaining CT chest/abd/pelvis, checking and LDH and decreasing azathioprine to 100 mg daily.  Recommend referral to Transplant ID.

## 2022-01-26 RX ORDER — AZATHIOPRINE 50 MG/1
100 TABLET ORAL DAILY
Qty: 180 TABLET | Refills: 3 | Status: SHIPPED | OUTPATIENT
Start: 2022-01-26 | End: 2022-01-28 | Stop reason: ALTCHOICE

## 2022-01-26 RX ORDER — TACROLIMUS 0.5 MG/1
0.5 CAPSULE ORAL 2 TIMES DAILY
Qty: 180 CAPSULE | Refills: 3 | Status: SHIPPED | OUTPATIENT
Start: 2022-01-26

## 2022-01-26 NOTE — TELEPHONE ENCOUNTER
FW: Lab results  Received: Yesterday  Lino Levi MD Blaisdell, Christin Rebecca, RN  Would do without IV contrast due to creatinine being a bit higher.     Jakob             Previous Messages       ----- Message -----   From: Alexia Aragon, RN   Sent: 1/25/2022   5:48 PM CST   To: Lino Levi MD   Subject: FW: Lab results                                   Is this CT to be with contrast so lymph nodes light up?     Alexia   __________________________________________________________  Reduce the azathioprine dose to 100 mg daily; Awa confirmed dose reduction. Prescription updated.     Assessment for the Reji Barr Viremia symptoms:  -Fever? No; T max 99.0   -Night Sweats? No   -Lack of Appetite and/or Weight Loss? Has lost weight intentionally. But food is not appetizing due to diarrhea.  -Rash? No  -Sore Throat and/or Swollen glands/lymph nodes in the neck or elsewhere? No  -Weakness and sore muscles? No  -Fatigue? Is tired; doesn't sleep well.     Have ordered the LDH lab as requested. Also repeating routine transplant labs: CBC, BMP, Tacrolimus level for dose reduction to tacrolimus 0.5 mg BID for elevated level of 6.6 on 1/12/22. Lab appt on 1/28/22.      CT imaging ordered without contrast per Dr. Levi. Imaging scheduling phone number provided to Awa ([phone]).     Transplant ID referral placed. Staff message sent to Adult Referral Specialist pool.     Alexia Aragon, RN, BSN  Solid Organ Transplant, Post Kidney and Pancreas  Transplant Care Coordinator  [phone]

## 2022-01-28 ENCOUNTER — LAB (OUTPATIENT)
Dept: LAB | Facility: CLINIC | Age: 57
End: 2022-01-28
Payer: COMMERCIAL

## 2022-01-28 ENCOUNTER — ANCILLARY PROCEDURE (OUTPATIENT)
Dept: CT IMAGING | Facility: CLINIC | Age: 57
End: 2022-01-28
Attending: INTERNAL MEDICINE
Payer: COMMERCIAL

## 2022-01-28 ENCOUNTER — TELEPHONE (OUTPATIENT)
Dept: TRANSPLANT | Facility: CLINIC | Age: 57
End: 2022-01-28

## 2022-01-28 DIAGNOSIS — Z11.59 ENCOUNTER FOR SCREENING FOR OTHER VIRAL DISEASES: ICD-10-CM

## 2022-01-28 DIAGNOSIS — Z94.0 KIDNEY TRANSPLANTED: Primary | ICD-10-CM

## 2022-01-28 DIAGNOSIS — Z48.298 AFTERCARE FOLLOWING ORGAN TRANSPLANT: ICD-10-CM

## 2022-01-28 DIAGNOSIS — Z79.899 ENCOUNTER FOR LONG-TERM CURRENT USE OF MEDICATION: ICD-10-CM

## 2022-01-28 DIAGNOSIS — D47.Z1 PTLD (POST-TRANSPLANT LYMPHOPROLIFERATIVE DISORDER) (H): ICD-10-CM

## 2022-01-28 DIAGNOSIS — B27.90 EBV INFECTION: ICD-10-CM

## 2022-01-28 DIAGNOSIS — Z94.0 KIDNEY TRANSPLANTED: ICD-10-CM

## 2022-01-28 DIAGNOSIS — D64.9 ANEMIA: ICD-10-CM

## 2022-01-28 DIAGNOSIS — D47.Z1 PTLD (POST-TRANSPLANT LYMPHOPROLIFERATIVE DISORDER) (H): Primary | ICD-10-CM

## 2022-01-28 DIAGNOSIS — D84.9 IMMUNOSUPPRESSED STATUS (H): ICD-10-CM

## 2022-01-28 DIAGNOSIS — Z94.0 KIDNEY REPLACED BY TRANSPLANT: ICD-10-CM

## 2022-01-28 LAB
ANION GAP SERPL CALCULATED.3IONS-SCNC: 6 MMOL/L (ref 3–14)
BUN SERPL-MCNC: 27 MG/DL (ref 7–30)
CALCIUM SERPL-MCNC: 8.5 MG/DL (ref 8.5–10.1)
CHLORIDE BLD-SCNC: 112 MMOL/L (ref 94–109)
CO2 SERPL-SCNC: 20 MMOL/L (ref 20–32)
CREAT SERPL-MCNC: 1.64 MG/DL (ref 0.52–1.04)
ERYTHROCYTE [DISTWIDTH] IN BLOOD BY AUTOMATED COUNT: 14.8 % (ref 10–15)
GFR SERPL CREATININE-BSD FRML MDRD: 36 ML/MIN/1.73M2
GLUCOSE BLD-MCNC: 123 MG/DL (ref 70–99)
HCT VFR BLD AUTO: 30.3 % (ref 35–47)
HGB BLD-MCNC: 10.1 G/DL (ref 11.7–15.7)
LDH SERPL L TO P-CCNC: 120 U/L (ref 81–234)
MCH RBC QN AUTO: 34.2 PG (ref 26.5–33)
MCHC RBC AUTO-ENTMCNC: 33.3 G/DL (ref 31.5–36.5)
MCV RBC AUTO: 103 FL (ref 78–100)
PLATELET # BLD AUTO: 216 10E3/UL (ref 150–450)
POTASSIUM BLD-SCNC: 4.3 MMOL/L (ref 3.4–5.3)
RBC # BLD AUTO: 2.95 10E6/UL (ref 3.8–5.2)
SODIUM SERPL-SCNC: 138 MMOL/L (ref 133–144)
TACROLIMUS BLD-MCNC: 4.2 UG/L (ref 5–15)
TME LAST DOSE: ABNORMAL H
TME LAST DOSE: ABNORMAL H
WBC # BLD AUTO: 3.6 10E3/UL (ref 4–11)

## 2022-01-28 PROCEDURE — 80197 ASSAY OF TACROLIMUS: CPT | Performed by: INTERNAL MEDICINE

## 2022-01-28 PROCEDURE — 74176 CT ABD & PELVIS W/O CONTRAST: CPT | Mod: GC | Performed by: RADIOLOGY

## 2022-01-28 PROCEDURE — 85027 COMPLETE CBC AUTOMATED: CPT | Performed by: PATHOLOGY

## 2022-01-28 PROCEDURE — 36415 COLL VENOUS BLD VENIPUNCTURE: CPT | Performed by: PATHOLOGY

## 2022-01-28 PROCEDURE — 71250 CT THORAX DX C-: CPT | Mod: GC | Performed by: RADIOLOGY

## 2022-01-28 PROCEDURE — 80048 BASIC METABOLIC PNL TOTAL CA: CPT | Performed by: PATHOLOGY

## 2022-01-28 PROCEDURE — 83615 LACTATE (LD) (LDH) ENZYME: CPT | Performed by: PATHOLOGY

## 2022-01-28 RX ORDER — EVEROLIMUS 1 MG/1
1 TABLET ORAL 2 TIMES DAILY
Qty: 60 TABLET | Refills: 0 | Status: SHIPPED | OUTPATIENT
Start: 2022-01-28 | End: 2022-01-31

## 2022-01-28 NOTE — TELEPHONE ENCOUNTER
CT Scan Results 1/28/22-sent to Dr. Levi:  IMPRESSION:   1. Innumerable new mesenteric lymph nodes concerning for PTLD given  high EBV load and transplant status.  2. Stable right lower quadrant renal transplant.  3. 4 mm pulmonary nodule in the right lower lobe has been stable since  2014, consistent with benign etiology.    The plan is to get additional imaging with a PET scan and lab work ordered by oncologist, Dr. Steven Garcia. A referral to oncology has been placed and message sent to new referral specialist team. If pt hasn't heard anything about these appointments by Tuesday next week (2/1/22) instructed to notify SOT office. Likely will get Ritux.    Anti-rejection Medications:  The plan for your immunosuppression medication is as follows:  We will taper off and discontinue the Azathioprine 100 mg and switch to Everolimus. This may cause rise in triglycerides (order for standing lipid panel every 6 months).  - A prescription for Everolimus has been sent to [facility] for a 1 month supply.  Pick this up from [facility] Monday 1/31/22 (they are ordering) and start on 2/1/22 (Day 1).   - Continue the Azathioprine 100 mg for 2 days (Day 1 and 2 on Everolimus).   - Then reduce the Azathioprine to 50 mg for 2 additional days (Day 3 and 4 on Everolimus).   - Day 5 after starting Everolimus, stop the Azathioprine.   Additional refills of Everolimus will be sent to [facility] (90 days).    Drug Levels:  - Tacrolimus dose will not change at this time. Goal for 12 hour trough 3-5.  - Everolimus goal for 12 hour trough also 3-5 range.  Get drug levels done around Tuesday 2/1/22. Ensure 12 hours between last dose and lab draw for both medications. (We need to repeat the tacrolimus level as this may fluctuate initially with new medication).   - Continue weekly labs for the next 4 weeks and also per oncology recommendations.     Please keep your infectious disease appointment for now. No kidney transplant  biopsy at this time either.     RNCC spoke with Awa in regards to above information.    Alexia Aragon RN, BSN  Solid Organ Transplant, Post Kidney and Pancreas  Transplant Care Coordinator  [phone]

## 2022-01-31 ENCOUNTER — PATIENT OUTREACH (OUTPATIENT)
Dept: ONCOLOGY | Facility: CLINIC | Age: 57
End: 2022-01-31
Payer: COMMERCIAL

## 2022-01-31 ENCOUNTER — TELEPHONE (OUTPATIENT)
Dept: TRANSPLANT | Facility: CLINIC | Age: 57
End: 2022-01-31
Payer: COMMERCIAL

## 2022-01-31 DIAGNOSIS — D84.9 IMMUNOSUPPRESSED STATUS (H): ICD-10-CM

## 2022-01-31 DIAGNOSIS — Z94.0 KIDNEY TRANSPLANTED: ICD-10-CM

## 2022-01-31 RX ORDER — EVEROLIMUS 1 MG/1
1 TABLET ORAL 2 TIMES DAILY
Qty: 60 TABLET | Refills: 0 | Status: ON HOLD | OUTPATIENT
Start: 2022-01-31 | End: 2022-06-28

## 2022-01-31 NOTE — TELEPHONE ENCOUNTER
RNCC spoke with Awa. She states Everolimus will cost $300 for 5 days and insurance is denying. Willing to have filled at  [facility] if not restricted to fill; Everolimus prescription sent. PA sent to pharmacy advocate. Will need to push out transplant labs to Monday for drug levels but okay to get oncology labs sooner. Continue Azathioprine 100 mg daily until Everolimus approval.     Awa is also concerned about PET scan insurance coverage. Would like to know if finance approved before she changes to special diet on Thursday. RNCC will inquire with department in AM.

## 2022-01-31 NOTE — TELEPHONE ENCOUNTER
Prior Authorization Specialty Medication Request    Medication/Dose: Everolimus  ICD code (if different than what is on RX): Kidney Transplanted Z94.0; EBV infection (B27.90; PTLD D47.71  Previously Tried and Failed:  Mycophenolate, Myfortic, Azathioprine.     Important Lab Values:   Rationale:     Insurance Name: [employer]  Insurance ID: [id number]  Insurance Phone Number:     Pharmacy Information (if different than what is on RX)  Name:  [facility]  Phone:

## 2022-01-31 NOTE — PROGRESS NOTES
New Patient Hematology / Oncology Nurse Navigator Note     Referral Date: 1/28/22    Referring provider:   Lino Levi MD [facility] OTHER SERVICES     Evaluation for :   B27.90 (ICD-10-CM) - EBV infection   D47.Z1 (ICD-10-CM) - PTLD (post-transplant lymphoproliferative disorder) (H)        Clinical History (per Nurse review of records provided):      1/28/22 CT CAP -- BOOKMARKED     SOT referring note 1/28/22 -- BOOKMARKED    1/19/22 lab -- BOOKMARKED   EBV DNA Copies/mL <=0 copies/mL 844,845 High      Kidney transplanted  - Primary Z94.0    EBV infection  B27.90    PTLD (post-transplant lymphoproliferative disorder) (H)  D47.Z1    Immunosuppressed status (H)        Records Location: [facility]     Referral updates and Plan:   January 31, 2022 OUTGOING CALL to pt:  Introduced my role as nurse navigator with [facility] Hematology/Oncology dept and that we have recd the referral for to Dr Garcia from Dr Levi Pt confirms she is aware of the referral and ready to schedule,clarifying questions answered and Awa was transferred pt to NPS line [phone] to schedule appts per scheduling instructions for consult with Dr Garcia 2/9 with PET prior, orders are in. Pt voiced understanding of above instructions and information and denied further questions    Anitha Joseph, RN, BSN, OCN  Hematology/Oncology Nurse Navigator   [facility]  [phone]    Date Time Provider Department Center   2/4/2022  7:30 AM [id number] [facility]   2/7/2022  8:15 AM EC LAB [facility]   2/9/2022 12:00 PM Steven Garcia MD [facility]

## 2022-01-31 NOTE — TELEPHONE ENCOUNTER
Pt having problems with the new med Alexia wanted her to start tomorrow  Her insurance is denying it and needs a PA and could take a week to get approval  Needs a call bacl

## 2022-02-01 NOTE — TELEPHONE ENCOUNTER
RECORDS STATUS - ALL OTHER DIAGNOSIS      RECORDS RECEIVED FROM: [facility]   DATE RECEIVED: 2/1   NOTES STATUS DETAILS   OFFICE NOTE from referring provider Lino Barry MD in  SOT OTHER SERVICES   OFFICE NOTE from medical oncologist     DISCHARGE SUMMARY from hospital     DISCHARGE REPORT from the ER     OPERATIVE REPORT     MEDICATION LIST [facility]    CLINICAL TRIAL TREATMENTS TO DATE     LABS     PATHOLOGY REPORTS     ANYTHING RELATED TO DIAGNOSIS Epic 1/28/22   GENONOMIC TESTING     TYPE:     IMAGING (NEED IMAGES & REPORT)     CT SCANS PACS [facility]   MRI     MAMMO     ULTRASOUND     PET

## 2022-02-01 NOTE — TELEPHONE ENCOUNTER
RNCC spoke with  [facility]ty Transplant pharmacist. They will look into filling Everolimus 30 day and start PA and/or advise where patient can have filled.     RNCC left VM message for PET scan , Mckayla Carmona for Friday's appt.     Updated Awa.

## 2022-02-04 NOTE — TELEPHONE ENCOUNTER
"RE: Referral received - PTLD  Received: Yesterday  Anitha Joseph RN Blaisdell, Christin Rebecca, RN; Mckayla Carmona  Cc: Steven Garcia MD; Lino Levi MD  Phone Number: [phone]     Thanks for the update, Alexia     I'll put the oncology referral on hold for now and we can reschedule if/when pt wants to     Anitha Joseph RN, BSN, OCN   Hematology/Oncology Nurse Navigator   [facility]   [phone]             Previous Messages       ----- Message -----   From: Alexia Aragon RN   Sent: 2/3/2022  11:34 AM CST   To: Lino Levi MD, Mckayla Carmona, *   Subject: RE: Referral received - PTLD                     I reached out to see if Awa was interested in appeal, below is her response from [employer]:     Received: Today   HarishRossy meadeci J  >lAexia Aragon RN   Hi no the appeal will not work, I have been working with several executives here and the barriers are nuts.  I want to see what happens with [place] and then i will get back to you, i canceled the Pet Scan and the appointment with the oncologist at the  for now as well as my labs.  i have a day at [place] on Tuesday once i know what they say i will get back to you.  Sorry I know how much work you have been putting into this [employer] is just so smyth stubborn and it is frustrating me more than ever.  The med is due to This decision isbased on health plan criteria for Everolimus. This medicine iscovered only if: You have failed or cannot use cyclosporine (genericNeoral, Sandimmune; Gengraf)  and the pet scan is due to \"did not receive lab work confirming that cells show cancer or the disease has come back.\"   so i will keep you updated on my next moves.     ____________________________________________________________________________   You  Awa Alexander 54 minutes ago (10:37 AM)     ENRICO [place], you certainly are welcome to a second opinion. Do you have appointments already scheduled? One " of the oncology care coordinators with Dr. Garcia asked about reaching out to discuss the option to appeal the denial with a peer to peer review process. Are you okay with Anitha calling?       I spoke with Dr. Levi yesterday about the everolimus and it is fine for now to stay on the Azathioprine reduced dose of 100 mg daily. Reducing the dose of immunosuppression should also reduce the Reji Barr Viral level. Would be advisable to check an EBV level on labs to confirm.       Let me know if you want to talk over the phone-       Alexia     ----- Message -----   From: Anitha Joseph, JAIRO   Sent: 2/3/2022   8:48 AM CST   To: Lino Levi MD, *   Subject: RE: Referral received - PTLD                     I've reviewed the chart further and it looks like pt wrote a [employer] message last night to Alexia indicating she's cancelled her appts and going to [place] next week, but did not specify what appts or with whom.       I will call to find out if she wants us to proceed with trying to get Insurance Auth through the peer to peer process for the PET Dr Garcia ordered -- or not.     If you already know the answer, willa Hale let me know. I'll be calling her this afternoon.     Thanks!   Anitha Joseph RN, BSN, OCN   Hematology/Oncology Nurse Navigator   [facility]     February 2, 2022     4:07 PM     Alexia Aragon, RN routed this conversation to Lino Levi MD Gjerde, Staci J   to Alexia Aragon, RN           3:55 PM   Andre Hale       I have canceled the pet scan [facility] will not approve it, and they again offered 5 days of the drug but i don't want to pay 300.00 if they never approve it in the long run.  My frustration level is so high right now.  I am going to [place] on the 8th for a 2nd opinion and treatment and meds, my old nephrologist is there and I want him to check things out as well.  I just wanted to let you know i canceled my appts at  this time with the PAOLA Blairci   ----- Message -----   From: Mckayla Carmona   Sent: 2/3/2022   6:53 AM CST   To: Lino Levi MD, *   Subject: RE: Referral received - PTLD                     Gary Hale,     I received the denial for her PET this morning unfortunately. They are denying because it was not lonnie proven. Even though she cancelled I would still recommend doing a peer/peer if the plan is to have her staged w/ a PET very soon. If it isn't done in a 14 day window from yesterday's denial then we will have to appeal through the health plan which can take up to 30 days.     Here is the info needed for the peer/peer:     Insurance: [facility]   Auth Vendor: [employer]   Phone: [phone] option 3 to schedule peer/peer   Due: 2/7/2022     Clinicals already Provided   Order: 1/28 PET order   Visit Notes: 1/31 and 1/28 phone encounter   Results: 1/28 ct cap   Other: 1/19 EBV result     Patient ID: [id number]   Case/Ref #: [id number]       Denial Reason: No results of lab testing that confirms that the cells showed cancer or that a disease has come back.     Please let me know if you have questions.     Thank Mckayla blancas   ----- Message -----   From: Alexia Aragon, JAIRO   Sent: 2/2/2022   4:24 PM CST   To: Lino Levi MD, Mckayla Carmona, *   Subject: RE: Referral received - PTLD                     We were still awaiting Authorization and she just cancelled out of the blue. Just received a MyChart message from patient you can reference.     Alexia Aragon, RN, BSN   Solid Organ Transplant, Post Kidney and Pancreas   Transplant Care Coordinator   [phone]   ----- Message -----   From: Anitha Joseph RN   Sent: 2/2/2022   4:08 PM CST   To: Alexia Aragon, RN, *   Subject: RE: Referral received - PTLD                     Hi Alexia Blairci's hem/onc referral popped into my review list today. It looks like she cancelled the 2/4 PET and the 2/9 consult with  "Dr Garcia \"via [employer]\", but I don't see info in chart re: reason why, wonder if it's auth-related or if you are aware of this.     Please let me know when she is ready to reschedule as Dr Garcia's schedule will need to be re-reviewed for availability.     Thanks!   Anitha Joseph, RN, BSN, OCN   Hematology/Oncology Nurse Navigator   [facility]     ----- Message -----   From: Steven Garcia MD   Sent: 1/28/2022   4:49 PM CST   To: Anitha Joseph, RN, Huong Velázquez RN   Subject: RE: Referral received - PTLD                     Let's get the PET scheduled and see how it goes. 2/9 is fine for right now and since I placed the orders, they will come to my inbasket and I can follow them.   ----- Message -----   From: Huong Velázquez RN   Sent: 1/28/2022   4:46 PM CST   To: Anitha Joseph RN, Steven Garcia MD, *   Subject: Referral received - PTLD                         Good afternoon Dr Garcia-     Hope your Friday is going well.     I see that you have been contacted about this EBV-PTLD patient referred to us and orders have been placed. Is there a date and time you would like for her to be scheduled at? I placed a hold on your 2/9 12:00 pm slot in case that is an appropriate timeframe to be seen. This is your first opening. I do not see that the PET-CT has been scheduled quite yet though not surprising given the orders having just been placed today.     Thanks-     Sophie (covering for J this afternoon)            "

## 2022-02-08 NOTE — TELEPHONE ENCOUNTER
PRIOR AUTHORIZATION DENIED    Medication: Everolimus - denied    Denial Date: 2/2/2022    Denial Rational:     Appeal Information:

## 2022-02-09 ENCOUNTER — PRE VISIT (OUTPATIENT)
Dept: TRANSPLANT | Facility: CLINIC | Age: 57
End: 2022-02-09
Payer: COMMERCIAL

## 2022-02-13 ENCOUNTER — HEALTH MAINTENANCE LETTER (OUTPATIENT)
Age: 57
End: 2022-02-13

## 2022-02-16 ENCOUNTER — PRE VISIT (OUTPATIENT)
Dept: INFECTIOUS DISEASES | Facility: CLINIC | Age: 57
End: 2022-02-16
Payer: COMMERCIAL

## 2022-02-21 DIAGNOSIS — B27.00 EPSTEIN BARR VIRUS POSITIVE MONONUCLEOSIS SYNDROME: ICD-10-CM

## 2022-02-21 DIAGNOSIS — Z94.0 KIDNEY REPLACED BY TRANSPLANT: Primary | ICD-10-CM

## 2022-02-21 DIAGNOSIS — D84.9 IMMUNODEFICIENCY (H): ICD-10-CM

## 2022-02-25 ENCOUNTER — IMMUNIZATION (OUTPATIENT)
Dept: NURSING | Facility: CLINIC | Age: 57
End: 2022-02-25
Payer: COMMERCIAL

## 2022-02-25 PROCEDURE — 0054A COVID-19,PF,PFIZER (12+ YRS): CPT

## 2022-02-25 PROCEDURE — 91305 COVID-19,PF,PFIZER (12+ YRS): CPT

## 2022-03-02 ENCOUNTER — LAB (OUTPATIENT)
Dept: LAB | Facility: CLINIC | Age: 57
End: 2022-03-02
Payer: COMMERCIAL

## 2022-03-02 DIAGNOSIS — Z94.0 KIDNEY REPLACED BY TRANSPLANT: ICD-10-CM

## 2022-03-02 DIAGNOSIS — B27.00 EPSTEIN BARR VIRUS POSITIVE MONONUCLEOSIS SYNDROME: ICD-10-CM

## 2022-03-02 DIAGNOSIS — D84.9 IMMUNOSUPPRESSED STATUS (H): ICD-10-CM

## 2022-03-02 DIAGNOSIS — D84.9 IMMUNODEFICIENCY (H): ICD-10-CM

## 2022-03-02 DIAGNOSIS — Z94.0 KIDNEY TRANSPLANTED: ICD-10-CM

## 2022-03-02 LAB
ANION GAP SERPL CALCULATED.3IONS-SCNC: 4 MMOL/L (ref 3–14)
BASOPHILS # BLD AUTO: 0 10E3/UL (ref 0–0.2)
BASOPHILS NFR BLD AUTO: 1 %
BUN SERPL-MCNC: 27 MG/DL (ref 7–30)
CALCIUM SERPL-MCNC: 8.9 MG/DL (ref 8.5–10.1)
CHLORIDE BLD-SCNC: 111 MMOL/L (ref 94–109)
CO2 SERPL-SCNC: 19 MMOL/L (ref 20–32)
CREAT SERPL-MCNC: 1.71 MG/DL (ref 0.52–1.04)
EOSINOPHIL # BLD AUTO: 0.1 10E3/UL (ref 0–0.7)
EOSINOPHIL NFR BLD AUTO: 3 %
ERYTHROCYTE [DISTWIDTH] IN BLOOD BY AUTOMATED COUNT: 14.4 % (ref 10–15)
GFR SERPL CREATININE-BSD FRML MDRD: 34 ML/MIN/1.73M2
GLUCOSE BLD-MCNC: 95 MG/DL (ref 70–99)
HCT VFR BLD AUTO: 31.3 % (ref 35–47)
HGB BLD-MCNC: 10.4 G/DL (ref 11.7–15.7)
LYMPHOCYTES # BLD AUTO: 0.7 10E3/UL (ref 0.8–5.3)
LYMPHOCYTES NFR BLD AUTO: 25 %
MCH RBC QN AUTO: 34.1 PG (ref 26.5–33)
MCHC RBC AUTO-ENTMCNC: 33.2 G/DL (ref 31.5–36.5)
MCV RBC AUTO: 103 FL (ref 78–100)
MONOCYTES # BLD AUTO: 0.5 10E3/UL (ref 0–1.3)
MONOCYTES NFR BLD AUTO: 18 %
NEUTROPHILS # BLD AUTO: 1.5 10E3/UL (ref 1.6–8.3)
NEUTROPHILS NFR BLD AUTO: 53 %
PLATELET # BLD AUTO: 221 10E3/UL (ref 150–450)
POTASSIUM BLD-SCNC: 4.3 MMOL/L (ref 3.4–5.3)
RBC # BLD AUTO: 3.05 10E6/UL (ref 3.8–5.2)
SODIUM SERPL-SCNC: 134 MMOL/L (ref 133–144)
TACROLIMUS BLD-MCNC: 5 UG/L (ref 5–15)
TME LAST DOSE: NORMAL H
TME LAST DOSE: NORMAL H
WBC # BLD AUTO: 2.8 10E3/UL (ref 4–11)

## 2022-03-02 PROCEDURE — 36415 COLL VENOUS BLD VENIPUNCTURE: CPT

## 2022-03-02 PROCEDURE — 87799 DETECT AGENT NOS DNA QUANT: CPT

## 2022-03-02 PROCEDURE — 85025 COMPLETE CBC W/AUTO DIFF WBC: CPT

## 2022-03-02 PROCEDURE — 80197 ASSAY OF TACROLIMUS: CPT

## 2022-03-02 PROCEDURE — 80048 BASIC METABOLIC PNL TOTAL CA: CPT

## 2022-03-03 LAB
EBV DNA COPIES/ML, INSTRUMENT: ABNORMAL COPIES/ML
EBV DNA SPEC NAA+PROBE-LOG#: 5.8 {LOG_COPIES}/ML

## 2022-03-08 ENCOUNTER — TELEPHONE (OUTPATIENT)
Dept: TRANSPLANT | Facility: CLINIC | Age: 57
End: 2022-03-08
Payer: COMMERCIAL

## 2022-03-08 NOTE — TELEPHONE ENCOUNTER
ISSUE: elevated Creatinine: 1.71, baseline 1.2-1.4, EBV slightly down but still elevated    OUTCOME: patient states she is drinking up to 3L of water per day, adequate UOP, is not monitoring BP- encouraged to do so. Patient reports some fatigue, denies fever, weight loss, night sweats.    PET [place] completed at [place] 2/15 for lymphadenopathy- results in care everywhere.    Patient is following up with oncology at [facility].  CT scheduled may 2022 to assess liver Leison.      Dr Mitch Branham uopdated,  Will return call to patient with any new recs.    Ankita Blount RN   Transplant Coordinator  [phone]      ADDENDEUM:  Patient is following with Dr Diaz at [facility] for nephrology care.      Dr Mitch Branham updated.    Ankita Blount RN   Transplant Coordinator  [phone]

## 2022-04-10 ENCOUNTER — HEALTH MAINTENANCE LETTER (OUTPATIENT)
Age: 57
End: 2022-04-10

## 2022-06-27 ENCOUNTER — NURSE TRIAGE (OUTPATIENT)
Dept: NURSING | Facility: CLINIC | Age: 57
End: 2022-06-27

## 2022-06-27 ENCOUNTER — HOSPITAL ENCOUNTER (INPATIENT)
Facility: CLINIC | Age: 57
LOS: 1 days | Discharge: OTHER INSTITUTION | DRG: 389 | End: 2022-06-29
Attending: EMERGENCY MEDICINE | Admitting: INTERNAL MEDICINE
Payer: COMMERCIAL

## 2022-06-27 ENCOUNTER — APPOINTMENT (OUTPATIENT)
Dept: ULTRASOUND IMAGING | Facility: CLINIC | Age: 57
DRG: 389 | End: 2022-06-27
Attending: EMERGENCY MEDICINE
Payer: COMMERCIAL

## 2022-06-27 ENCOUNTER — APPOINTMENT (OUTPATIENT)
Dept: GENERAL RADIOLOGY | Facility: CLINIC | Age: 57
DRG: 389 | End: 2022-06-27
Attending: EMERGENCY MEDICINE
Payer: COMMERCIAL

## 2022-06-27 ENCOUNTER — APPOINTMENT (OUTPATIENT)
Dept: CT IMAGING | Facility: CLINIC | Age: 57
DRG: 389 | End: 2022-06-27
Attending: EMERGENCY MEDICINE
Payer: COMMERCIAL

## 2022-06-27 DIAGNOSIS — R07.9 CHEST PAIN, UNSPECIFIED TYPE: ICD-10-CM

## 2022-06-27 DIAGNOSIS — K63.89 SMALL BOWEL MASS: ICD-10-CM

## 2022-06-27 DIAGNOSIS — R06.02 SOB (SHORTNESS OF BREATH): ICD-10-CM

## 2022-06-27 DIAGNOSIS — K56.609 SMALL BOWEL OBSTRUCTION (H): ICD-10-CM

## 2022-06-27 DIAGNOSIS — R79.89 ELEVATED D-DIMER: ICD-10-CM

## 2022-06-27 DIAGNOSIS — R06.00 DYSPNEA, UNSPECIFIED TYPE: ICD-10-CM

## 2022-06-27 LAB
ALBUMIN SERPL-MCNC: 3.3 G/DL (ref 3.4–5)
ALBUMIN UR-MCNC: 20 MG/DL
ALP SERPL-CCNC: 95 U/L (ref 40–150)
ALT SERPL W P-5'-P-CCNC: 22 U/L (ref 0–50)
ANION GAP SERPL CALCULATED.3IONS-SCNC: 3 MMOL/L (ref 3–14)
APPEARANCE UR: CLEAR
AST SERPL W P-5'-P-CCNC: 32 U/L (ref 0–45)
BASOPHILS # BLD AUTO: 0 10E3/UL (ref 0–0.2)
BASOPHILS NFR BLD AUTO: 1 %
BILIRUB SERPL-MCNC: 0.4 MG/DL (ref 0.2–1.3)
BILIRUB UR QL STRIP: NEGATIVE
BUN SERPL-MCNC: 21 MG/DL (ref 7–30)
CALCIUM SERPL-MCNC: 9.3 MG/DL (ref 8.5–10.1)
CHLORIDE BLD-SCNC: 103 MMOL/L (ref 94–109)
CO2 SERPL-SCNC: 26 MMOL/L (ref 20–32)
COLOR UR AUTO: YELLOW
CREAT SERPL-MCNC: 1.49 MG/DL (ref 0.52–1.04)
D DIMER PPP FEU-MCNC: 3.18 UG/ML FEU (ref 0–0.5)
EOSINOPHIL # BLD AUTO: 0.1 10E3/UL (ref 0–0.7)
EOSINOPHIL NFR BLD AUTO: 1 %
ERYTHROCYTE [DISTWIDTH] IN BLOOD BY AUTOMATED COUNT: 14.6 % (ref 10–15)
GFR SERPL CREATININE-BSD FRML MDRD: 41 ML/MIN/1.73M2
GLUCOSE BLD-MCNC: 107 MG/DL (ref 70–99)
GLUCOSE UR STRIP-MCNC: NEGATIVE MG/DL
HCT VFR BLD AUTO: 34.1 % (ref 35–47)
HGB BLD-MCNC: 11.1 G/DL (ref 11.7–15.7)
HGB UR QL STRIP: NEGATIVE
HOLD SPECIMEN: NORMAL
IMM GRANULOCYTES # BLD: 0 10E3/UL
IMM GRANULOCYTES NFR BLD: 1 %
KETONES UR STRIP-MCNC: NEGATIVE MG/DL
LACTATE SERPL-SCNC: 0.8 MMOL/L (ref 0.7–2)
LEUKOCYTE ESTERASE UR QL STRIP: ABNORMAL
LYMPHOCYTES # BLD AUTO: 1 10E3/UL (ref 0.8–5.3)
LYMPHOCYTES NFR BLD AUTO: 23 %
MCH RBC QN AUTO: 31.9 PG (ref 26.5–33)
MCHC RBC AUTO-ENTMCNC: 32.6 G/DL (ref 31.5–36.5)
MCV RBC AUTO: 98 FL (ref 78–100)
MONOCYTES # BLD AUTO: 0.6 10E3/UL (ref 0–1.3)
MONOCYTES NFR BLD AUTO: 13 %
MUCOUS THREADS #/AREA URNS LPF: PRESENT /LPF
NEUTROPHILS # BLD AUTO: 2.7 10E3/UL (ref 1.6–8.3)
NEUTROPHILS NFR BLD AUTO: 61 %
NITRATE UR QL: NEGATIVE
NRBC # BLD AUTO: 0 10E3/UL
NRBC BLD AUTO-RTO: 0 /100
NT-PROBNP SERPL-MCNC: 74 PG/ML (ref 0–900)
PH UR STRIP: 5.5 [PH] (ref 5–7)
PLATELET # BLD AUTO: 267 10E3/UL (ref 150–450)
POTASSIUM BLD-SCNC: 4.6 MMOL/L (ref 3.4–5.3)
PROT SERPL-MCNC: 10.9 G/DL (ref 6.8–8.8)
RBC # BLD AUTO: 3.48 10E6/UL (ref 3.8–5.2)
RBC URINE: 5 /HPF
SODIUM SERPL-SCNC: 132 MMOL/L (ref 133–144)
SP GR UR STRIP: 1.03 (ref 1–1.03)
SQUAMOUS EPITHELIAL: 1 /HPF
TROPONIN I SERPL HS-MCNC: 5 NG/L
UROBILINOGEN UR STRIP-MCNC: NORMAL MG/DL
WBC # BLD AUTO: 4.3 10E3/UL (ref 4–11)
WBC URINE: 11 /HPF

## 2022-06-27 PROCEDURE — 93970 EXTREMITY STUDY: CPT

## 2022-06-27 PROCEDURE — 83880 ASSAY OF NATRIURETIC PEPTIDE: CPT | Performed by: EMERGENCY MEDICINE

## 2022-06-27 PROCEDURE — 83036 HEMOGLOBIN GLYCOSYLATED A1C: CPT | Performed by: INTERNAL MEDICINE

## 2022-06-27 PROCEDURE — 84484 ASSAY OF TROPONIN QUANT: CPT | Performed by: EMERGENCY MEDICINE

## 2022-06-27 PROCEDURE — 96374 THER/PROPH/DIAG INJ IV PUSH: CPT | Mod: 59

## 2022-06-27 PROCEDURE — 81001 URINALYSIS AUTO W/SCOPE: CPT | Performed by: EMERGENCY MEDICINE

## 2022-06-27 PROCEDURE — 85379 FIBRIN DEGRADATION QUANT: CPT | Performed by: EMERGENCY MEDICINE

## 2022-06-27 PROCEDURE — 85018 HEMOGLOBIN: CPT | Performed by: EMERGENCY MEDICINE

## 2022-06-27 PROCEDURE — 36415 COLL VENOUS BLD VENIPUNCTURE: CPT | Performed by: EMERGENCY MEDICINE

## 2022-06-27 PROCEDURE — 83605 ASSAY OF LACTIC ACID: CPT | Performed by: EMERGENCY MEDICINE

## 2022-06-27 PROCEDURE — 74176 CT ABD & PELVIS W/O CONTRAST: CPT

## 2022-06-27 PROCEDURE — 87086 URINE CULTURE/COLONY COUNT: CPT | Performed by: EMERGENCY MEDICINE

## 2022-06-27 PROCEDURE — C9803 HOPD COVID-19 SPEC COLLECT: HCPCS

## 2022-06-27 PROCEDURE — 93005 ELECTROCARDIOGRAM TRACING: CPT

## 2022-06-27 PROCEDURE — 71046 X-RAY EXAM CHEST 2 VIEWS: CPT

## 2022-06-27 PROCEDURE — 99285 EMERGENCY DEPT VISIT HI MDM: CPT | Mod: 25

## 2022-06-27 PROCEDURE — 87799 DETECT AGENT NOS DNA QUANT: CPT | Performed by: INTERNAL MEDICINE

## 2022-06-27 PROCEDURE — 80053 COMPREHEN METABOLIC PANEL: CPT | Performed by: EMERGENCY MEDICINE

## 2022-06-27 NOTE — TELEPHONE ENCOUNTER
"Triage call:     Patient calling with abdominal pain for 5 days   Shooting pain from lower abdomen to top of stomach that she rates as severe.   Feels distended and hard. She is bloated   Symptoms are worsening   Difficulty eating, causes her to be nauseous.   Temperature 100.0 last night   SOB after going up stairs last night and today. She reports feeling \"easily winded.\"     Per protocol, advised patient to go to ER now. She verbalizes understanding and agreement. Care advice given. She plans on having someone drive her to [facility].     Maira Sprague RN   06/27/22 2:09 PM  [facility] Nurse Advisor    Reason for Disposition    SEVERE abdominal pain (e.g., excruciating)    Additional Information    Negative: Passed out (i.e., fainted, collapsed and was not responding)    Negative: Shock suspected (e.g., cold/pale/clammy skin, too weak to stand, low BP, rapid pulse)    Negative: Sounds like a life-threatening emergency to the triager    Negative: Chest pain    Negative: Pain is mainly in upper abdomen (if needed ask: 'is it mainly above the belly button?')    Negative: Abdominal pain and pregnant > 20 weeks    Negative: Abdominal pain and pregnant < 20 weeks    Protocols used: ABDOMINAL PAIN - FEMALE-A-OH    COVID 19 Nurse Triage Plan/Patient Instructions    Please be aware that novel coronavirus (COVID-19) may be circulating in the community. If you develop symptoms such as fever, cough, or SOB or if you have concerns about the presence of another infection including coronavirus (COVID-19), please contact your health care provider or visit [email or web address].     Disposition/Instructions    ED Visit recommended. Follow protocol based instructions.     Bring Your Own Device:  Please also bring your smart device(s) (smart phones, tablets, laptops) and their charging cables for your personal use and to communicate with your care team during your visit.    Thank you for taking steps to " prevent the spread of this virus.  o Limit your contact with others.  o Wear a simple mask to cover your cough.  o Wash your hands well and often.    Resources    [facility]: About COVID-19: [email or web address]    CDC: What to Do If You're Sick: www.cdc.gov/coronavirus/2019-ncov/about/steps-when-sick.html    CDC: Ending Home Isolation: www.cdc.gov/coronavirus/2019-ncov/hcp/disposition-in-home-patients.html     CDC: Caring for Someone: www.cdc.gov/coronavirus/2019-ncov/if-you-are-sick/care-for-someone.html     [facility]: Interim Guidance for Hospital Discharge to Home: www.[facility].mn./diseases/coronavirus/hcp/hospdischarge.pdf    [facility] clinical trials (COVID-19 research studies): clinicalaffairs.[facility]-clinical-trials     Below are the COVID-19 hotlines at the [place] Department of Health ([facility]). Interpreters are available.   o For health questions: Call 675-148-0825 or 1-292.580.5287 (7 a.m. to 7 p.m.)  o For questions about schools and childcare: Call 341-146-8834 or 1-501.335.6353 (7 a.m. to 7 p.m.)

## 2022-06-27 NOTE — ED TRIAGE NOTES
Pt reports 5 days of lower abdominal pain. Pt states shes had N/D with the pain.      Triage Assessment     Row Name 06/27/22 8936       Triage Assessment (Adult)    Airway WDL WDL       Respiratory WDL    Respiratory WDL WDL       Skin Circulation/Temperature WDL    Skin Circulation/Temperature WDL WDL       Cardiac WDL    Cardiac WDL WDL       Peripheral/Neurovascular WDL    Peripheral Neurovascular WDL WDL       Cognitive/Neuro/Behavioral WDL    Cognitive/Neuro/Behavioral WDL WDL

## 2022-06-28 ENCOUNTER — APPOINTMENT (OUTPATIENT)
Dept: NUCLEAR MEDICINE | Facility: CLINIC | Age: 57
DRG: 389 | End: 2022-06-28
Attending: INTERNAL MEDICINE
Payer: COMMERCIAL

## 2022-06-28 PROBLEM — R06.00 DYSPNEA, UNSPECIFIED TYPE: Status: ACTIVE | Noted: 2022-06-28

## 2022-06-28 PROBLEM — K63.89 SMALL BOWEL MASS: Status: ACTIVE | Noted: 2022-06-28

## 2022-06-28 PROBLEM — R79.89 ELEVATED D-DIMER: Status: ACTIVE | Noted: 2022-06-28

## 2022-06-28 PROBLEM — K56.609 SMALL BOWEL OBSTRUCTION (H): Status: ACTIVE | Noted: 2022-06-28

## 2022-06-28 PROBLEM — C85.93 LYMPHOMA OF INTESTINE (H): Status: ACTIVE | Noted: 2022-06-28

## 2022-06-28 LAB
ANION GAP SERPL CALCULATED.3IONS-SCNC: 5 MMOL/L (ref 3–14)
ATRIAL RATE - MUSE: 77 BPM
BUN SERPL-MCNC: 20 MG/DL (ref 7–30)
CALCIUM SERPL-MCNC: 9 MG/DL (ref 8.5–10.1)
CHLORIDE BLD-SCNC: 107 MMOL/L (ref 94–109)
CO2 SERPL-SCNC: 23 MMOL/L (ref 20–32)
CREAT SERPL-MCNC: 1.42 MG/DL (ref 0.52–1.04)
DIASTOLIC BLOOD PRESSURE - MUSE: NORMAL MMHG
EBV DNA COPIES/ML, INSTRUMENT: ABNORMAL COPIES/ML
EBV DNA SPEC NAA+PROBE-LOG#: 5.6 {LOG_COPIES}/ML
GFR SERPL CREATININE-BSD FRML MDRD: 43 ML/MIN/1.73M2
GLUCOSE BLD-MCNC: 97 MG/DL (ref 70–99)
GLUCOSE BLDC GLUCOMTR-MCNC: 84 MG/DL (ref 70–99)
GLUCOSE BLDC GLUCOMTR-MCNC: 84 MG/DL (ref 70–99)
GLUCOSE BLDC GLUCOMTR-MCNC: 89 MG/DL (ref 70–99)
GLUCOSE BLDC GLUCOMTR-MCNC: 97 MG/DL (ref 70–99)
HBA1C MFR BLD: 6 %
INTERPRETATION ECG - MUSE: NORMAL
LDH SERPL L TO P-CCNC: 162 U/L (ref 81–234)
MAGNESIUM SERPL-MCNC: 2 MG/DL (ref 1.6–2.3)
P AXIS - MUSE: 51 DEGREES
POTASSIUM BLD-SCNC: 4.6 MMOL/L (ref 3.4–5.3)
POTASSIUM BLD-SCNC: 4.6 MMOL/L (ref 3.4–5.3)
PR INTERVAL - MUSE: 178 MS
QRS DURATION - MUSE: 68 MS
QT - MUSE: 380 MS
QTC - MUSE: 430 MS
R AXIS - MUSE: 12 DEGREES
SARS-COV-2 RNA RESP QL NAA+PROBE: NEGATIVE
SODIUM SERPL-SCNC: 135 MMOL/L (ref 133–144)
SYSTOLIC BLOOD PRESSURE - MUSE: NORMAL MMHG
T AXIS - MUSE: 58 DEGREES
VENTRICULAR RATE- MUSE: 77 BPM

## 2022-06-28 PROCEDURE — 343N000001 HC RX 343: Performed by: INTERNAL MEDICINE

## 2022-06-28 PROCEDURE — 99223 1ST HOSP IP/OBS HIGH 75: CPT | Mod: AI | Performed by: INTERNAL MEDICINE

## 2022-06-28 PROCEDURE — 78580 LUNG PERFUSION IMAGING: CPT

## 2022-06-28 PROCEDURE — 258N000003 HC RX IP 258 OP 636: Performed by: INTERNAL MEDICINE

## 2022-06-28 PROCEDURE — 120N000001 HC R&B MED SURG/OB

## 2022-06-28 PROCEDURE — 250N000012 HC RX MED GY IP 250 OP 636 PS 637: Performed by: HOSPITALIST

## 2022-06-28 PROCEDURE — U0003 INFECTIOUS AGENT DETECTION BY NUCLEIC ACID (DNA OR RNA); SEVERE ACUTE RESPIRATORY SYNDROME CORONAVIRUS 2 (SARS-COV-2) (CORONAVIRUS DISEASE [COVID-19]), AMPLIFIED PROBE TECHNIQUE, MAKING USE OF HIGH THROUGHPUT TECHNOLOGIES AS DESCRIBED BY CMS-2020-01-R: HCPCS | Performed by: EMERGENCY MEDICINE

## 2022-06-28 PROCEDURE — 250N000013 HC RX MED GY IP 250 OP 250 PS 637: Performed by: INTERNAL MEDICINE

## 2022-06-28 PROCEDURE — 36415 COLL VENOUS BLD VENIPUNCTURE: CPT | Performed by: HOSPITALIST

## 2022-06-28 PROCEDURE — A9540 TC99M MAA: HCPCS | Performed by: INTERNAL MEDICINE

## 2022-06-28 PROCEDURE — 250N000012 HC RX MED GY IP 250 OP 636 PS 637: Performed by: INTERNAL MEDICINE

## 2022-06-28 PROCEDURE — 84132 ASSAY OF SERUM POTASSIUM: CPT | Performed by: HOSPITALIST

## 2022-06-28 PROCEDURE — 250N000011 HC RX IP 250 OP 636: Performed by: EMERGENCY MEDICINE

## 2022-06-28 PROCEDURE — 258N000003 HC RX IP 258 OP 636: Performed by: EMERGENCY MEDICINE

## 2022-06-28 PROCEDURE — 99222 1ST HOSP IP/OBS MODERATE 55: CPT | Performed by: INTERNAL MEDICINE

## 2022-06-28 PROCEDURE — 99204 OFFICE O/P NEW MOD 45 MIN: CPT | Performed by: SURGERY

## 2022-06-28 PROCEDURE — 250N000013 HC RX MED GY IP 250 OP 250 PS 637: Performed by: HOSPITALIST

## 2022-06-28 PROCEDURE — 83735 ASSAY OF MAGNESIUM: CPT | Performed by: HOSPITALIST

## 2022-06-28 PROCEDURE — 96374 THER/PROPH/DIAG INJ IV PUSH: CPT | Mod: 59

## 2022-06-28 PROCEDURE — 99223 1ST HOSP IP/OBS HIGH 75: CPT | Performed by: INTERNAL MEDICINE

## 2022-06-28 PROCEDURE — 250N000011 HC RX IP 250 OP 636: Performed by: INTERNAL MEDICINE

## 2022-06-28 PROCEDURE — 80048 BASIC METABOLIC PNL TOTAL CA: CPT | Performed by: INTERNAL MEDICINE

## 2022-06-28 PROCEDURE — 83615 LACTATE (LD) (LDH) ENZYME: CPT | Performed by: INTERNAL MEDICINE

## 2022-06-28 PROCEDURE — 99207 PR APP CREDIT; MD BILLING SHARED VISIT: CPT | Performed by: HOSPITALIST

## 2022-06-28 RX ORDER — TACROLIMUS 1 MG/1
1 CAPSULE ORAL
Status: COMPLETED | OUTPATIENT
Start: 2022-06-28 | End: 2022-06-28

## 2022-06-28 RX ORDER — TACROLIMUS 1 MG/1
1 CAPSULE ORAL EVERY MORNING
COMMUNITY

## 2022-06-28 RX ORDER — DEXTROSE MONOHYDRATE 25 G/50ML
25-50 INJECTION, SOLUTION INTRAVENOUS
Status: DISCONTINUED | OUTPATIENT
Start: 2022-06-28 | End: 2022-06-29 | Stop reason: HOSPADM

## 2022-06-28 RX ORDER — HYDROMORPHONE HYDROCHLORIDE 1 MG/ML
0.5 INJECTION, SOLUTION INTRAMUSCULAR; INTRAVENOUS; SUBCUTANEOUS EVERY 10 MIN PRN
Status: DISCONTINUED | OUTPATIENT
Start: 2022-06-28 | End: 2022-06-28

## 2022-06-28 RX ORDER — PROCHLORPERAZINE MALEATE 10 MG
10 TABLET ORAL EVERY 6 HOURS PRN
Status: DISCONTINUED | OUTPATIENT
Start: 2022-06-28 | End: 2022-06-29 | Stop reason: HOSPADM

## 2022-06-28 RX ORDER — TACROLIMUS 1 MG/1
1 CAPSULE ORAL EVERY MORNING
Status: DISCONTINUED | OUTPATIENT
Start: 2022-06-29 | End: 2022-06-29 | Stop reason: HOSPADM

## 2022-06-28 RX ORDER — LIDOCAINE 40 MG/G
CREAM TOPICAL
Status: DISCONTINUED | OUTPATIENT
Start: 2022-06-28 | End: 2022-06-29 | Stop reason: HOSPADM

## 2022-06-28 RX ORDER — ACETAMINOPHEN 650 MG/1
650 SUPPOSITORY RECTAL EVERY 6 HOURS PRN
Status: DISCONTINUED | OUTPATIENT
Start: 2022-06-28 | End: 2022-06-29 | Stop reason: HOSPADM

## 2022-06-28 RX ORDER — ACETAMINOPHEN 325 MG/1
650 TABLET ORAL EVERY 6 HOURS PRN
Status: DISCONTINUED | OUTPATIENT
Start: 2022-06-28 | End: 2022-06-29 | Stop reason: HOSPADM

## 2022-06-28 RX ORDER — ONDANSETRON 2 MG/ML
4 INJECTION INTRAMUSCULAR; INTRAVENOUS EVERY 6 HOURS PRN
Status: DISCONTINUED | OUTPATIENT
Start: 2022-06-28 | End: 2022-06-29 | Stop reason: HOSPADM

## 2022-06-28 RX ORDER — TACROLIMUS 0.5 MG/1
0.5 CAPSULE ORAL EVERY EVENING
Status: DISCONTINUED | OUTPATIENT
Start: 2022-06-28 | End: 2022-06-29 | Stop reason: HOSPADM

## 2022-06-28 RX ORDER — NALOXONE HYDROCHLORIDE 0.4 MG/ML
0.4 INJECTION, SOLUTION INTRAMUSCULAR; INTRAVENOUS; SUBCUTANEOUS
Status: DISCONTINUED | OUTPATIENT
Start: 2022-06-28 | End: 2022-06-29 | Stop reason: HOSPADM

## 2022-06-28 RX ORDER — HYDROMORPHONE HCL IN WATER/PF 6 MG/30 ML
0.2 PATIENT CONTROLLED ANALGESIA SYRINGE INTRAVENOUS
Status: DISCONTINUED | OUTPATIENT
Start: 2022-06-28 | End: 2022-06-29 | Stop reason: HOSPADM

## 2022-06-28 RX ORDER — SODIUM CHLORIDE 9 MG/ML
INJECTION, SOLUTION INTRAVENOUS ONCE
Status: COMPLETED | OUTPATIENT
Start: 2022-06-28 | End: 2022-06-28

## 2022-06-28 RX ORDER — NALOXONE HYDROCHLORIDE 0.4 MG/ML
0.2 INJECTION, SOLUTION INTRAMUSCULAR; INTRAVENOUS; SUBCUTANEOUS
Status: DISCONTINUED | OUTPATIENT
Start: 2022-06-28 | End: 2022-06-29 | Stop reason: HOSPADM

## 2022-06-28 RX ORDER — AZATHIOPRINE 50 MG/1
100 TABLET ORAL DAILY
Status: COMPLETED | OUTPATIENT
Start: 2022-06-28 | End: 2022-06-28

## 2022-06-28 RX ORDER — PROCHLORPERAZINE 25 MG
25 SUPPOSITORY, RECTAL RECTAL EVERY 12 HOURS PRN
Status: DISCONTINUED | OUTPATIENT
Start: 2022-06-28 | End: 2022-06-29 | Stop reason: HOSPADM

## 2022-06-28 RX ORDER — AZATHIOPRINE 50 MG/1
100 TABLET ORAL EVERY MORNING
Status: DISCONTINUED | OUTPATIENT
Start: 2022-06-29 | End: 2022-06-29 | Stop reason: HOSPADM

## 2022-06-28 RX ORDER — AZATHIOPRINE 50 MG/1
100 TABLET ORAL EVERY MORNING
COMMUNITY

## 2022-06-28 RX ORDER — SODIUM CHLORIDE 9 MG/ML
INJECTION, SOLUTION INTRAVENOUS CONTINUOUS
Status: DISCONTINUED | OUTPATIENT
Start: 2022-06-28 | End: 2022-06-29

## 2022-06-28 RX ORDER — NICOTINE POLACRILEX 4 MG
15-30 LOZENGE BUCCAL
Status: DISCONTINUED | OUTPATIENT
Start: 2022-06-28 | End: 2022-06-29 | Stop reason: HOSPADM

## 2022-06-28 RX ORDER — SULFAMETHOXAZOLE AND TRIMETHOPRIM 400; 80 MG/1; MG/1
1 TABLET ORAL EVERY OTHER DAY
Status: DISCONTINUED | OUTPATIENT
Start: 2022-06-28 | End: 2022-06-29 | Stop reason: HOSPADM

## 2022-06-28 RX ORDER — ONDANSETRON 4 MG/1
4 TABLET, ORALLY DISINTEGRATING ORAL EVERY 6 HOURS PRN
Status: DISCONTINUED | OUTPATIENT
Start: 2022-06-28 | End: 2022-06-29 | Stop reason: HOSPADM

## 2022-06-28 RX ADMIN — ACETAMINOPHEN 650 MG: 325 TABLET ORAL at 04:33

## 2022-06-28 RX ADMIN — SODIUM CHLORIDE: 9 INJECTION, SOLUTION INTRAVENOUS at 07:48

## 2022-06-28 RX ADMIN — TACROLIMUS 0.5 MG: 0.5 CAPSULE ORAL at 20:24

## 2022-06-28 RX ADMIN — AZATHIOPRINE 100 MG: 50 TABLET ORAL at 07:45

## 2022-06-28 RX ADMIN — SODIUM CHLORIDE: 9 INJECTION, SOLUTION INTRAVENOUS at 01:42

## 2022-06-28 RX ADMIN — OXYCODONE HYDROCHLORIDE 2.5 MG: 5 TABLET ORAL at 19:09

## 2022-06-28 RX ADMIN — KIT FOR THE PREPARATION OF TECHNETIUM TC 99M ALBUMIN AGGREGATED 3.6 MILLICURIE: 2.5 INJECTION, POWDER, FOR SOLUTION INTRAVENOUS at 08:11

## 2022-06-28 RX ADMIN — ONDANSETRON 4 MG: 2 INJECTION INTRAMUSCULAR; INTRAVENOUS at 22:12

## 2022-06-28 RX ADMIN — HYDROMORPHONE HYDROCHLORIDE 0.5 MG: 1 INJECTION, SOLUTION INTRAMUSCULAR; INTRAVENOUS; SUBCUTANEOUS at 01:42

## 2022-06-28 RX ADMIN — OXYCODONE HYDROCHLORIDE 2.5 MG: 5 TABLET ORAL at 10:54

## 2022-06-28 RX ADMIN — SODIUM CHLORIDE: 9 INJECTION, SOLUTION INTRAVENOUS at 18:46

## 2022-06-28 RX ADMIN — SULFAMETHOXAZOLE AND TRIMETHOPRIM 1 TABLET: 400; 80 TABLET ORAL at 13:36

## 2022-06-28 RX ADMIN — OXYCODONE HYDROCHLORIDE 2.5 MG: 5 TABLET ORAL at 14:45

## 2022-06-28 RX ADMIN — ACETAMINOPHEN 650 MG: 325 TABLET ORAL at 19:09

## 2022-06-28 RX ADMIN — TACROLIMUS 1 MG: 1 CAPSULE ORAL at 07:45

## 2022-06-28 ASSESSMENT — ACTIVITIES OF DAILY LIVING (ADL)
ADLS_ACUITY_SCORE: 20
ADLS_ACUITY_SCORE: 35
ADLS_ACUITY_SCORE: 20

## 2022-06-28 NOTE — ED NOTES
[facility]  ED Nurse Handoff Report    ED Chief complaint: Abdominal Pain      ED Diagnosis:   Final diagnoses:   Small bowel obstruction (H)   Lymphoma of intestine (H)   Dyspnea, unspecified type       Code Status: To be determined by admitting provider.     Allergies:   Allergies   Allergen Reactions     Cefazolin Other (See Comments)     Patient tolerated ceftriaxone and ceftazidime during 1/13/2020 admission to [facility] - ceftriaxone was tolerated after possible reaction to cefazolin.  Pt describes rxn as facial swelling     Flurbiprofen Other (See Comments)     Can not take due to kidney transplant  Can not take due to kidney transplant       Nsaids      PN: LW Reaction: KIDNEY PROBLEM       Patient Story:  Patient reports with lower abdominal discomfort for the last several days. N/D with the pain. Patient has a history of kidney transplant and enlarged lymph nodes in abdomen.    Focused Assessment:    Patient is A&Ox4. Patient denies cough or SOB. Breathing rate, rhythm and SPO2 WDL. HR WDL and denies chest pain. Mild HTN. Moderate lower abdominal centralized pain.     Labs Ordered and Resulted from Time of ED Arrival to Time of ED Departure   ROUTINE UA WITH MICROSCOPIC REFLEX TO CULTURE - Abnormal       Result Value    Color Urine Yellow      Appearance Urine Clear      Glucose Urine Negative      Bilirubin Urine Negative      Ketones Urine Negative      Specific Gravity Urine 1.028      Blood Urine Negative      pH Urine 5.5      Protein Albumin Urine 20  (*)     Urobilinogen Urine Normal      Nitrite Urine Negative      Leukocyte Esterase Urine Moderate (*)     Mucus Urine Present (*)     RBC Urine 5 (*)     WBC Urine 11 (*)     Squamous Epithelials Urine 1     COMPREHENSIVE METABOLIC PANEL - Abnormal    Sodium 132 (*)     Potassium 4.6      Chloride 103      Carbon Dioxide (CO2) 26      Anion Gap 3      Urea Nitrogen 21      Creatinine 1.49 (*)     Calcium 9.3      Glucose 107  (*)     Alkaline Phosphatase 95      AST 32      ALT 22      Protein Total 10.9 (*)     Albumin 3.3 (*)     Bilirubin Total 0.4      GFR Estimate 41 (*)    CBC WITH PLATELETS AND DIFFERENTIAL - Abnormal    WBC Count 4.3      RBC Count 3.48 (*)     Hemoglobin 11.1 (*)     Hematocrit 34.1 (*)     MCV 98      MCH 31.9      MCHC 32.6      RDW 14.6      Platelet Count 267      % Neutrophils 61      % Lymphocytes 23      % Monocytes 13      % Eosinophils 1      % Basophils 1      % Immature Granulocytes 1      NRBCs per 100 WBC 0      Absolute Neutrophils 2.7      Absolute Lymphocytes 1.0      Absolute Monocytes 0.6      Absolute Eosinophils 0.1      Absolute Basophils 0.0      Absolute Immature Granulocytes 0.0      Absolute NRBCs 0.0     D DIMER QUANTITATIVE - Abnormal    D-Dimer Quantitative 3.18 (*)    LACTIC ACID WHOLE BLOOD - Normal    Lactic Acid 0.8     NT PROBNP INPATIENT - Normal    N terminal Pro BNP Inpatient 74     TROPONIN I - Normal    Troponin I High Sensitivity 5     COVID-19 VIRUS (CORONAVIRUS) BY PCR   URINE CULTURE       XR Chest 2 Views   Final Result   IMPRESSION: Resolved plate-like atelectasis in the right lower lung seen previously. Both lungs are clear today. No adenopathy. Normal cardiac size and pulmonary vascularity. Mildly atherosclerotic thoracic aorta. Mild degenerative changes both shoulders    and the spine. Cholecystectomy clips.      Abd/pelvis CT no contrast - Stone Protocol   Final Result   IMPRESSION:    1.  Right lower quadrant renal transplant without stones, obstruction or adjacent free fluid. Native kidneys have been resected.      2.  Interval development of irregular thickening of a loop of mid/distal ileum, worrisome for primary small bowel lymphoma. Upstream ectasia of a few small bowel loops representing partial mechanical obstruction, free gas or free fluid. Numerous    mesenteric nodes, stable.      3.  Cholecystectomy. Hysterectomy.      4.  Findings discussed with the  "referring physician, Dr. Ceasar Licea, following the scan at 2335 hours.      NOTE: ABNORMAL REPORT      THE DICTATION ABOVE DESCRIBES AN ABNORMALITY FOR WHICH FOLLOW-UP IS NEEDED.         US Lower Extremity Venous Duplex Bilateral   Final Result   IMPRESSION:   1.  No deep venous thrombosis in the bilateral lower extremities.            Treatments and/or interventions provided:  Medications   HYDROmorphone (PF) (DILAUDID) injection 0.5 mg (has no administration in time range)   sodium chloride 0.9% infusion (has no administration in time range)       Patient's response to treatments and/or interventions:  Pain control with dilaudid. Patient remains stable.     To be done/followed up on inpatient unit:   See any in-patient orders. VQ scan in the morning. Monitor abdominal pain.    Does this patient have any cognitive concerns?: N/A    Activity level - Baseline/Home:    Independent    Activity Level - Current:    Stand with Assist    Patient's Preferred language: English     Needed?: No    Isolation: None  Infection: Not Applicable  Patient tested for COVID 19 prior to admission: YES    Bariatric?: No    Vital Signs:   Vitals:    06/27/22 1509 06/27/22 2100   BP: (!) 158/86 (!) 142/86   Pulse: 101 91   Resp: 18 20   Temp: 97.6  F (36.4  C)    TempSrc: Oral    SpO2: 97% 99%   Weight: 79.4 kg (175 lb)    Height: 1.651 m (5' 5\")        Cardiac Rhythm:     Was the PSS-3 completed:   Yes  What interventions are required if any?                 Family Comments: N/A    OBS brochure/video discussed/provided to patient/family: N/A              Name of person given brochure if not patient: N/A              Relationship to patient: N/A    For the majority of the shift this patient's behavior was Green.  Behavioral interventions performed were N/A.    ED NURSE PHONE NUMBER: [phone]  "

## 2022-06-28 NOTE — PHARMACY-ADMISSION MEDICATION HISTORY
Pharmacy Medication History  Admission medication history interview status for the 6/27/2022  admission is complete. See EPIC admission navigator for prior to admission medications     Location of Interview: Phone  Medication history sources: Patient and Surescripts    Significant changes made to the medication list:  Added Imuran.  Changed tacrolimus to 1 mg in AM and 0.5 mg in PM.  Changed dose of Ozempic injection to 0.5 mg every Sunday.  Changed sodium bicarb to BID PRN, has not used in past month.  Changed fish oil to 1g BID.  Changed Vitamin D to 1000 units daily.     In the past week, patient estimated taking medication this percent of the time: greater than 90%    Medication reconciliation completed by provider prior to medication history? Partially    Time spent in this activity: 25 minutes    Prior to Admission medications    Medication Sig Last Dose Taking? Auth Provider Long Term End Date   azaTHIOprine (IMURAN) 50 MG tablet Take 100 mg by mouth every morning 6/27/2022 at AM Yes Unknown, Entered By History No    cholecalciferol (VITAMIN  -D) 1000 UNITS capsule Take 1 capsule by mouth daily 6/27/2022 at AM Yes Sola Enriquez MD     fish oil-omega-3 fatty acids 1000 MG capsule Take 1 g by mouth 2 times daily 6/27/2022 at AM Yes Reported, Patient     OZEMPIC, 1 MG/DOSE, 2 MG/1.5ML pen Inject 0.5 mg Subcutaneous every 7 days Every Sunday. 6/26/2022 at AM Yes Reported, Patient     sodium bicarbonate 650 MG tablet Take 650 mg by mouth 2 times daily as needed (low bicarbonate on labs) More than a month at Unknown time Yes Lino Levi MD     sulfamethoxazole-trimethoprim (BACTRIM) 400-80 MG tablet Take 1 tablet by mouth every other day 6/26/2022 at AM Yes Lino Levi MD Yes    tacrolimus (GENERIC EQUIVALENT) 0.5 MG capsule Take 0.5 mg by mouth every evening 6/26/2022 at PM Yes Lino Levi MD Yes    tacrolimus (GENERIC EQUIVALENT) 1 MG capsule Take 1 mg by mouth every morning  6/27/2022 at AM Yes Unknown, Entered By History Yes    blood glucose (PHUONG CONTOUR NEXT) test strip Use to test blood sugar three times daily or as directed.  Patient not taking: No sig reported   Nellie Emerson PA-C         The information provided in this note is only as accurate as the sources available at the time of update(s)

## 2022-06-28 NOTE — PROGRESS NOTES
RECEIVING UNIT ED HANDOFF REVIEW    ED Nurse Handoff Report was reviewed by: Arabella Chua RN on June 28, 2022 at 3:42 AM

## 2022-06-28 NOTE — ED PROVIDER NOTES
"  History   Chief Complaint:  Abdominal Pain    HPI   Awa Alexander is a 56 year old female with history of kidney transplant who presents for evaluation of left lower quadrant abdominal pain that radiates into her left upper quadrant.  Symptoms have been worsening over the last 5 days.  Her temperature was elevated to 100 degrees last night.  Her kidney transplant is located in her right lower quadrant.    She states she has a decrease in the amount of gas she is passing.  She had a normal bowel movement yesterday.  She denies dysuria.  No history of similar symptoms.    In addition, she complains of dyspnea on exertion with walking up stairs.          [place] Nurse Advisor Triage call from today:   Patient calling with abdominal pain for 5 days   Shooting pain from lower abdomen to top of stomach that she rates as severe.   Feels distended and hard. She is bloated   Symptoms are worsening   Difficulty eating, causes her to be nauseous.   Temperature 100.0 last night   SOB after going up stairs last night and today. She reports feeling \"easily winded.\"   Per protocol, advised patient to go to ER now. She verbalizes understanding and agreement. Care advice given. She plans on having someone drive her to [facility].   Maira Sprague RN   06/27/22 2:09 PM  [facility] Nurse Advisor      CT scan done at [facility] June 1, 2022  Merlin Harden M.B.B.S., M.D. - 06/01/2022   Formatting of this note might be different from the original.   EXAM:  CT ABDOMEN PELVIS WITHOUT IV CONTRAST   COMPARISON:  Compared to outside hospital CT 01/28/2022   FINDINGS:  Subcentimeter hypodensity in the left hepatic lobe is unchanged. Cholecystectomy.   Pancreas, spleen and adrenals are within normal limits. Right lower quadrant renal transplant.   Bilateral native kidneys are surgically absent. Multiple mildly enlarged mesenteric lymph nodes are   stable. For example a lymph node in the central mesentery measures 2.9 cm " (series 3/image 61). There   is a focal thickening of the central lower abdominal bowel loop which extends over a length of 7 cm   (series 3/image 88, series 4/image 43) with increased surrounding perienteric fat stranding. This is   consistent with lymphomatous involvement of the bowel loop. No evidence of bowel obstruction.   Degenerative changes in the spine, bilateral SI and hip joints.   IMPRESSION:   1. New focal thickening involving the small bowel loop in the lower abdomen is consistent with   lymphomatous involvement.   2. No change in the hypoattenuating liver lesions and mesenteric lymphadenopathy.        Allergies:  Cefazolin  Flurbiprofen  Nsaids    Medications:   blood glucose (PHUONG CONTOUR NEXT) test strip  cholecalciferol (VITAMIN  -D) 1000 UNITS capsule  everolimus (ZORTRESS) 1 MG tablet  fish oil-omega-3 fatty acids 1000 MG capsule  OZEMPIC, 1 MG/DOSE, 2 MG/1.5ML pen  sodium bicarbonate 650 MG tablet  sulfamethoxazole-trimethoprim (BACTRIM) 400-80 MG tablet  Sulfamethoxazole-Trimethoprim (SMZ-TMP DS PO)  tacrolimus (GENERIC EQUIVALENT) 0.5 MG capsule        Past Medical History:    Past Medical History:   Diagnosis Date     Anemia in chronic kidney disease(285.21)      Diabetes mellitus (H) 2015     Dyslipidaemia      ESRD (end stage renal disease) (H)      High risk medication use      History of basal cell cancer      Immunosuppressed status (H)      Kidney replaced by transplant 2011     PKD (polycystic kidney disease) 1994     Patient Active Problem List   Diagnosis     PKD (polycystic kidney disease)     Ureteral stricture of right kidney transplant     Aftercare following organ transplant     Health Care Home     History of basal cell cancer     Immunosuppression (H)     Kidney replaced by transplant     HTN, kidney transplant related     Dyslipidemia     Type 2 diabetes, HbA1c goal < 7% (H)     Diabetes mellitus (H)     Vitamin D deficiency        Past Surgical History:    Past Surgical  "History:   Procedure Laterality Date     APPENDECTOMY       CHOLECYSTECTOMY       CYSTOSCOPY, REMOVE STENT(S), COMBINED  1/24/2013    Procedure: COMBINED CYSTOSCOPY, REMOVE STENT(S);  Removal of Double J Stent ;  Surgeon: Manpreet Mares MD;  Location: [facility] OR     GYN SURGERY      supracervical hysterectomy.  pt has ovaries     INSERT STENT URETER  10/29/2012    Procedure: INSERT STENT URETER (PRE-OP);  Replacement of Single  J Stents with Tandem Stents;  Surgeon: Shahab Silva MD;  Location: [facility] OR     NEPHRECTOMY BILATERAL  12/21/2012    Procedure: NEPHRECTOMY BILATERAL;  Bilateral Native Nephrectomy with Ureteropylostomy, Appendectomy, Cholecystectomy, Transplant Ureter Stent Placement 6fr;  Surgeon: Manpreet Mares MD;  Location: [facility] OR     PERCUTANEOUS NEPHROSTOMY  3/16/2012    Procedure:PERCUTANEOUS NEPHROSTOMY; Double J Stent Change melissa; Surgeon:GASTON LAU; Location:[facility] OR     TRANSPLANT KIDNEY RECIPIENT LIVING UNRELATED  7/22/2011    Procedure:TRANSPLANT KIDNEY RECIPIENT LIVING UNRELATED; transplanted into right iliac fossa.; Surgeon:MANPREET MARES; Location:[facility] OR        Family History:    Family History   Problem Relation Age of Onset     Genitourinary Problems Mother         PCKD     Cardiovascular Mother         brain aneurysm causing death     Genitourinary Problems Brother         PCKD     Genitourinary Problems Sister         PCKD     Diabetes Sister         on high dose steroid for kidney transplant       Social History:  PCP: Moisés Mares  Presents to the ED with her sister  Social History     Tobacco Use     Smoking status: Never Smoker     Smokeless tobacco: Never Used   Substance Use Topics     Alcohol use: No     Alcohol/week: 0.0 standard drinks     Comment: \"not anymore since the transplant\"     Drug use: No       Review of Systems  See the HPI, otherwise the rest of the ROS is negative.      Physical Exam     Patient Vitals for the past 24 hrs:   BP Temp Temp src Pulse Resp SpO2 " "Height Weight   06/28/22 0215 -- -- -- -- -- 96 % -- --   06/28/22 0200 -- -- -- -- -- 95 % -- --   06/28/22 0145 128/86 -- -- -- -- -- -- --   06/27/22 2100 (!) 142/86 -- -- 91 20 99 % -- --   06/27/22 1509 (!) 158/86 97.6  F (36.4  C) Oral 101 18 97 % 1.651 m (5' 5\") 79.4 kg (175 lb)       Physical Exam  General: Alert, No distress. Nontoxic appearance  Head: No signs of trauma.   Mouth/Throat: Oropharynx moist.   Eyes: Conjunctivae are normal. Pupils are equal..   Neck: Normal range of motion.    CV: Appears well perfused.  Resp:No respiratory distress.   MSK: Normal range of motion. No obvious deformity.   Neuro: The patient is alert and interactive. PALOMINO. Speech normal. GCS 15  Skin: No lesion or sign of trauma noted.   Psych: normal mood and affect. behavior is normal.       Emergency Department Course     ECG:  ECG taken at 2120, ECG read at 2121 by Ceasar Licea MD  Normal sinus rhythm  Low voltage QRS  Borderline ECG  Rate 77 bpm. MA interval 178. QRS duration 68. QT/QTc 380/430. P-R-T axes 51 12 58.      Imaging:  XR Chest 2 Views   Final Result   IMPRESSION: Resolved plate-like atelectasis in the right lower lung seen previously. Both lungs are clear today. No adenopathy. Normal cardiac size and pulmonary vascularity. Mildly atherosclerotic thoracic aorta. Mild degenerative changes both shoulders    and the spine. Cholecystectomy clips.      Abd/pelvis CT no contrast - Stone Protocol   Final Result   IMPRESSION:    1.  Right lower quadrant renal transplant without stones, obstruction or adjacent free fluid. Native kidneys have been resected.      2.  Interval development of irregular thickening of a loop of mid/distal ileum, worrisome for primary small bowel lymphoma. Upstream ectasia of a few small bowel loops representing partial mechanical obstruction, free gas or free fluid. Numerous    mesenteric nodes, stable.      3.  Cholecystectomy. Hysterectomy.      4.  Findings discussed with the referring " physician, Dr. Ceasar Licea, following the scan at 2335 hours.      NOTE: ABNORMAL REPORT      THE DICTATION ABOVE DESCRIBES AN ABNORMALITY FOR WHICH FOLLOW-UP IS NEEDED.         US Lower Extremity Venous Duplex Bilateral   Final Result   IMPRESSION:   1.  No deep venous thrombosis in the bilateral lower extremities.         Laboratory:  Labs Ordered and Resulted from Time of ED Arrival to Time of ED Departure   ROUTINE UA WITH MICROSCOPIC REFLEX TO CULTURE - Abnormal       Result Value    Color Urine Yellow      Appearance Urine Clear      Glucose Urine Negative      Bilirubin Urine Negative      Ketones Urine Negative      Specific Gravity Urine 1.028      Blood Urine Negative      pH Urine 5.5      Protein Albumin Urine 20  (*)     Urobilinogen Urine Normal      Nitrite Urine Negative      Leukocyte Esterase Urine Moderate (*)     Mucus Urine Present (*)     RBC Urine 5 (*)     WBC Urine 11 (*)     Squamous Epithelials Urine 1     COMPREHENSIVE METABOLIC PANEL - Abnormal    Sodium 132 (*)     Potassium 4.6      Chloride 103      Carbon Dioxide (CO2) 26      Anion Gap 3      Urea Nitrogen 21      Creatinine 1.49 (*)     Calcium 9.3      Glucose 107 (*)     Alkaline Phosphatase 95      AST 32      ALT 22      Protein Total 10.9 (*)     Albumin 3.3 (*)     Bilirubin Total 0.4      GFR Estimate 41 (*)    CBC WITH PLATELETS AND DIFFERENTIAL - Abnormal    WBC Count 4.3      RBC Count 3.48 (*)     Hemoglobin 11.1 (*)     Hematocrit 34.1 (*)     MCV 98      MCH 31.9      MCHC 32.6      RDW 14.6      Platelet Count 267      % Neutrophils 61      % Lymphocytes 23      % Monocytes 13      % Eosinophils 1      % Basophils 1      % Immature Granulocytes 1      NRBCs per 100 WBC 0      Absolute Neutrophils 2.7      Absolute Lymphocytes 1.0      Absolute Monocytes 0.6      Absolute Eosinophils 0.1      Absolute Basophils 0.0      Absolute Immature Granulocytes 0.0      Absolute NRBCs 0.0     D DIMER QUANTITATIVE - Abnormal     D-Dimer Quantitative 3.18 (*)    LACTIC ACID WHOLE BLOOD - Normal    Lactic Acid 0.8     NT PROBNP INPATIENT - Normal    N terminal Pro BNP Inpatient 74     TROPONIN I - Normal    Troponin I High Sensitivity 5     COVID-19 VIRUS (CORONAVIRUS) BY PCR - Normal    SARS CoV2 PCR Negative     EBV DNA PCR QUANTITATIVE WHOLE BLOOD   URINE CULTURE         Procedures:    Interventions:  Medications   HYDROmorphone (PF) (DILAUDID) injection 0.5 mg (0.5 mg Intravenous Given 6/28/22 0142)   sodium chloride 0.9% infusion ( Intravenous New Bag 6/28/22 0142)       Emergency Department Course/Medical Decision Making:  This patient is presenting to the ER with abdominal pain.  She has a past medical history of renal transplant.  In addition, a CT done through [facility] at the beginning of June showed a mass in her bowel.  Her differential diagnosis today includes diverticulitis, bowel obstruction, perforation, hemorrhage, enlarging mass.  The CT done today shows an enlarging mass which is now caused a partial bowel obstruction.  She is not actively vomiting but is in need of bowel rest and pain control.  I spoke with Dr. Navas regarding the admission.  He felt the patient would likely be better served at the [facility] because that is where her nephrologist has been and this issue has been partially evaluated using a fine needle biopsy.  We made arrangements for her to be transferred to [facility] but they currently are on divert and have no beds.  In addition the [facility] is where the patient had her renal transplant.  We attempted to make transfer to the [facility] but they to do not have beds at this time.  The patient will be admitted to the hospitalist for further evaluation and treatment.    In addition the patient complains of ongoing dyspnea with exertion.  With possible malignancy she is at high risk for development of pulmonary embolism.  Her D-dimer is elevated.  She refuses to undergo studies  with IV contrast because of her renal transplant.  Her chest x-ray is clear.  Bilateral lower extremity ultrasound showed no evidence of DVT.  She is not hypoxic nor tachypneic.  In order to fully evaluate for PE in this patient a VQ scan would be indicated.  This may be done while the patient is admitted to the hospital.  No indication for starting heparin at this time      Diagnosis:    ICD-10-CM    1. Small bowel obstruction (H)  K56.609    2. Dyspnea, unspecified type  R06.00    3. Elevated d-dimer  R79.89    4. Small bowel mass  K63.89        Disposition:  Admitted to Dr Navas.         Ceasar Licea MD  06/28/22 [id number]

## 2022-06-28 NOTE — H&P
[facility]    History and Physical - Hospitalist Service       Date of Admission:  6/27/2022    Assessment & Plan      Awa Alexander is a 56 year old female admitted on 6/27/2022. 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  Numerous prominent, though stable mesenteric lymph nodes on CT imaging.  -General surgery consulted; I anticipate a need for tissue sampling and possible resection in the setting of obstruction.  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  This was discussed with patient on admission.  Attempted transfer to [facility], where she is being followed for this possibility as well as [facility] where she initially underwent transplant and previously had been followed by transplant team.  No beds were available.  -Sending repeat EBV quantitative PCR; previously downtrending viremia as of March, still a positive study at [place] earlier this month, though not quantitative PCR.  -N.p.o.  -As needed nausea medications, low-dose narcotics for pain control  -Normal saline maintenance fluids    Polycystic kidney disease  status post living donor kidney transplant 2011, native nephrectomies 2012: Initial transplant at the [facility].  It appears that she did have some acute BK cellular rejection briefly after transplant.  Had been doing well with transplant up until this past year where she had some increasing creatinine.  Kidney biopsy this past March was reassuring.  Initially followed at [facility], recently transferred to [facility] where her prior nephrologist is secondary to insurance coverage for recent PET scan available at [facility] though not at the [facility].  -For now, plan to resume prior to admission Imuran and tacrolimus when reconciled by pharmacy.  It appears prior to admission Imuran is 100 mg daily and PTA tacrolimus was 1 mg qAM, 0.5 mg HS (recent change based on chart reivew).  I have ordered initial a.m. doses of these medications  -Nephrology consulted.  Given concern for posttransplant lymphoproliferative disease, there has been some discussion regarding possible changes in her immunosuppression as recently as 6/1/2022 visit with her nephrology team at [facility].  -Resume prior to admission Bactrim QOD when reconciled by pharmacy    Type 2 diabetes: Typically on weekly Ozempic.  Hemoglobin A1c has been in the mid 5 range.  -Medium dose sliding scale insulin every 4 hours while NPO  -Hemoglobin A1c pending    Elevated D-dimer: On presentation, patient apparently had shortness of breath.  She has no shortness of breath, chest pain, pleuritic discomfort currently.  A D-dimer was obtained in ER and elevated; plan had been for a CT PE study in the emergency department, though patient reported that she is unable to receive contrast secondary to her renal transplant.  Suspect this lab value secondary to inflammatory condition rather than pulmonary embolism   -VQ scan ordered; these are not available overnight    Chronic kidney disease, stage III: Creatinine of 1.49.  On the better end of her  "baseline  -Normal saline at 100/h while n.p.o.  -Monitor CMP         Diet:  NPO; for now we will plan to continue medications, though if worsening nausea or episode of emesis, would make strict n.p.o. and given her to IV formulations of immunosuppression  DVT Prophylaxis: Pneumatic Compression Devices pending surgical evaluation.  Likely chemoprophylaxis if there is no immediate plan for procedural intervention or biopsy  Zeng Catheter: Not present  Central Lines: None  Cardiac Monitoring: None  Code Status:  Full code.  Confirmed with patient on admission.    Clinically Significant Risk Factors Present on Admission         # Hyponatremia: Na = 132 mmol/L (Ref range: 133 - 144 mmol/L) on admission, will monitor as appropriate     # Hypoalbuminemia: Albumin = 3.3 g/dL (Ref range: 3.4 - 5.0 g/dL) on admission, will monitor as appropriate        # Overweight: Estimated body mass index is 29.12 kg/m  as calculated from the following:    Height as of this encounter: 1.651 m (5' 5\").    Weight as of this encounter: 79.4 kg (175 lb).        Disposition Plan    anticipate discharge home 3-5 days pending return of bowel function; possible need for procedural intervention for tissue or bowel resection.  I also have concern that patient may require transfer to transplant center for treatment of posttransplant lymphoproliferative disease.     The patient's care was discussed with the Patient and Dr. Licea in the emergency department.    Jl Navas MD  Hospitalist Service  [facility]  Securely message with the [employer] Web Console (learn more here)  Text page via [employer] Paging/Directory         ______________________________________________________________________    Chief Complaint   Abdominal pain, nausea    History is obtained from patient, chart review, discussion with Dr. Licea in the emergency department, review of outside records including recent follow-up including liver lesion biopsy at " [facility] 6/6/2022.    History of Present Illness   Awa Alexander is a 56 year old female who presents to the emergency department with approximately 6 days of abdominal pain, today with nausea and increasing pain, absence of flatus or bowel movement today.  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    The patient has a past medical history significant for polycystic kidney disease, and she underwent right lower quadrant living kidney transplant in 2011 at the [facility].  In 2012 she had bilateral nephrectomy of her native kidneys.  It appears that she did have some BK virus cellular rejection shortly after transplant, though this was apparently mild.    Patient had some increasing creatinine over the past few years, also noted to have EBV viremia at the [facility] where she had been following with transplant team.  Was recommended for a PET scan to evaluate mesenteric lymphadenopathy, though patient tells me that this was not [facility] only covered if performed through the [facility].  Patient subsequently transitioned her care to [facility], where her primary nephrologist from years ago, Dr. Diaz, was working.  She underwent a kidney transplant biopsy 3/23/2022 in the setting of concern for posttransplant lymphoproliferative disease as well as increasing creatinine.  This did not demonstrate any acute rejection or evidence of inflammation or cellular infiltrate related to EBV viremia.  There was a plan to follow patient's mesenteric lymphadenopathy and hypermetabolic liver lesions with serial imaging as of March follow-up with nephrology.  Patient was seen in June after repeat imaging demonstrating some focal thickening in the small bowel loops but otherwise stable liver lesions and mesenteric lymphadenopathy.  The case was discussed with oncology at that  time with recommendation to pursue CT-guided sampling of mesenteric lymphadenopathy.  There is a mention that if biopsy was nondiagnostic, laparoscopic surgery may need to be pursued to rule out PTLD.  There is also mention of immunosuppression changes at that time pending biopsy results.    On June 6, patient underwent an ultrasound-guided fine-needle aspiration and core biopsy of a 1.2 cm liver lesion.  As above, initial plan had been for CT-guided biopsy of mesenteric lymphadenopathy, though given anticipated difficulty and risk associated with obtaining the sample from mesentery, liver biopsy was pursued with the understanding that if this study was nondiagnostic, might later need laparoscopic tissue sampling of mesentery.  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    Patient tells me she was awaiting further follow-up with her nephrologist, though her general understanding was that her biopsy results were reassuring.  She recognizes that there was a plan for potential evaluation of her mesenteric lymphadenopathy or bowel wall thickening, though tells me that this was to take place months from now after repeat imaging.  I discussed at length that with her current presentation for partial bowel obstruction related likely to bowel wall thickening that the time course for evaluation of her bowel wall thickening may have been moved forward.  Similarly, with obstruction secondary to this, she might require surgical treatment for her bowel obstruction rather than watchful waiting.  I discussed my concerns that she might require significant changes to her immunosuppression including possible initiation on rituximab and steroids.  This was also mentioned on her 6/1/2022 visit with nephrology.  In this setting, attempted transfer to transplant center, though no beds were available at [facility] or the [facility].  Patient will be  admitted here for treatment of her bowel obstruction, though we may need to involve her transplant team via telephone if bed remains unavailable.    Patient has some peritoneal signs.  She tells me that she had a low-grade temperature of 100, though no true fever.  Her abdominal pain has been present for the past 6 days or so, though she has been having regular bowel movements.  No bowel movement as of 6/27/2022, however.  She also tells me that she had new nausea this evening, though no episodes of emesis.  She thought she had gas pain, and actually thought she might be discharging from the emergency department after her pain was controlled.    Review of Systems    The 10 point Review of Systems is negative other than noted in the HPI or here.  No fevers  Nausea this evening which patient believes might have been related to pain.  No episodes of emesis  No blood in stool.     Past Medical History    I have reviewed this patient's medical history and updated it with pertinent information if needed.   Past Medical History:   Diagnosis Date     Anemia in chronic kidney disease(285.21)      Diabetes mellitus (H) 2015     Dyslipidaemia      ESRD (end stage renal disease) (H)      High risk medication use      History of basal cell cancer     nose and ear     Immunosuppressed status (H)      Kidney replaced by transplant 2011     PKD (polycystic kidney disease) 1994       Past Surgical History   I have reviewed this patient's surgical history and updated it with pertinent information if needed.  Past Surgical History:   Procedure Laterality Date     APPENDECTOMY       CHOLECYSTECTOMY       CYSTOSCOPY, REMOVE STENT(S), COMBINED  1/24/2013    Procedure: COMBINED CYSTOSCOPY, REMOVE STENT(S);  Removal of Double J Stent ;  Surgeon: Manpreet Lopes MD;  Location: [facility] OR     GYN SURGERY      supracervical hysterectomy.  pt has ovaries     INSERT STENT URETER  10/29/2012    Procedure: INSERT STENT URETER (PRE-OP);  Replacement of  "Single  J Stents with Tandem Stents;  Surgeon: Shahab Silva MD;  Location: [facility] OR     NEPHRECTOMY BILATERAL  12/21/2012    Procedure: NEPHRECTOMY BILATERAL;  Bilateral Native Nephrectomy with Ureteropylostomy, Appendectomy, Cholecystectomy, Transplant Ureter Stent Placement 6fr;  Surgeon: Manpreet Mares MD;  Location: [facility] OR     PERCUTANEOUS NEPHROSTOMY  3/16/2012    Procedure:PERCUTANEOUS NEPHROSTOMY; Double J Stent Change melissa; Surgeon:GASTON LAU; Location:[facility] OR     TRANSPLANT KIDNEY RECIPIENT LIVING UNRELATED  7/22/2011    Procedure:TRANSPLANT KIDNEY RECIPIENT LIVING UNRELATED; transplanted into right iliac fossa.; Surgeon:MANPREET MARES; Location:[facility] OR       Social History   I have reviewed this patient's social history and updated it with pertinent information if needed.  Social History     Tobacco Use     Smoking status: Never Smoker     Smokeless tobacco: Never Used   Substance Use Topics     Alcohol use: No     Alcohol/week: 0.0 standard drinks     Comment: \"not anymore since the transplant\"     Drug use: No       Family History   I have reviewed this patient's family history and updated it with pertinent information if needed.  Family History   Problem Relation Age of Onset     Genitourinary Problems Mother         PCKD     Cardiovascular Mother         brain aneurysm causing death     Genitourinary Problems Brother         PCKD     Genitourinary Problems Sister         PCKD     Diabetes Sister         on high dose steroid for kidney transplant       Prior to Admission Medications   Prior to Admission Medications   Prescriptions Last Dose Informant Patient Reported? Taking?   OZEMPIC, 1 MG/DOSE, 2 MG/1.5ML pen   Yes No   Sig: Every monday   Sulfamethoxazole-Trimethoprim (SMZ-TMP DS PO)   Yes No   blood glucose (PHUONG CONTOUR NEXT) test strip   No No   Sig: Use to test blood sugar three times daily or as directed.   Patient not taking: Reported on 1/30/2020   cholecalciferol (VITAMIN  -D) " 1000 UNITS capsule   No No   Sig: Take 5 capsules (5,000 Units) by mouth daily   everolimus (ZORTRESS) 1 MG tablet   No No   Sig: Take 1 tablet (1 mg) by mouth 2 times daily   fish oil-omega-3 fatty acids 1000 MG capsule   Yes No   Sig: Take 2 g by mouth daily   sodium bicarbonate 650 MG tablet   No No   Sig: Take 1 tablet (650 mg) by mouth 2 times daily   sulfamethoxazole-trimethoprim (BACTRIM) 400-80 MG tablet   No No   Sig: Take 1 tablet by mouth every other day   tacrolimus (GENERIC EQUIVALENT) 0.5 MG capsule   No No   Sig: Take 1 capsule (0.5 mg) by mouth 2 times daily      Facility-Administered Medications: None     Allergies   Allergies   Allergen Reactions     Cefazolin Other (See Comments)     Patient tolerated ceftriaxone and ceftazidime during 1/13/2020 admission to [facility] - ceftriaxone was tolerated after possible reaction to cefazolin.  Pt describes rxn as facial swelling     Flurbiprofen Other (See Comments)     Can not take due to kidney transplant  Can not take due to kidney transplant       Nsaids      PN: LW Reaction: KIDNEY PROBLEM       Physical Exam   Vital Signs: Temp: 97.6  F (36.4  C) Temp src: Oral BP: (!) 142/86 Pulse: 91   Resp: 20 SpO2: 99 % O2 Device: None (Room air)    Weight: 175 lbs 0 oz     General Appearance: Generally well-appearing 56-year-old female resting comfortably in bed.  She is in no acute distress.  Does not appear toxic   Eyes: no scleral icterus or injection  HEENT: Normocephalic and atraumatic.   Respiratory: Breath sounds are pristine bilaterally with no wheezes, no crackles   Cardiovascular: regular rate and rhythm, no murmur  GI: Bowel sounds are present and normal active.  Not particularly high-pitched.  Diffuse tenderness to palpation with some rebound present throughout abdomen.  Mild guarding.  Abdomen is not tense or significantly distended.  Skin: Mild rosacea present.  No jaundice  Musculoskeletal: Muscular tone and bulk intact in all extremities  and appropriate for age  Neurologic: Alert, conversant, appropriate conversation.  Mental status is grossly intact.  No tremor noted  Psychiatric: Pleasant, normal affect    Data   Data reviewed today: I reviewed all medications, new labs and imaging results over the last 24 hours. I personally reviewed the abdominal CT image(s) showing Small bowel wall thickening with some mild dilation concerning for obstruction. renal transplant in right lower quadrant.     Recent Labs   Lab 06/27/22  2107   WBC 4.3   HGB 11.1*   MCV 98      *   POTASSIUM 4.6   CHLORIDE 103   CO2 26   BUN 21   CR 1.49*   ANIONGAP 3   NATASHA 9.3   *   ALBUMIN 3.3*   PROTTOTAL 10.9*   BILITOTAL 0.4   ALKPHOS 95   ALT 22   AST 32

## 2022-06-28 NOTE — CONSULTS
[facility]    Hematology/Oncology Consult Note      Date of Admission:  6/27/2022  Date of Consult:  06/28/22  Reason for Consult: ? 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  We have to wait for surgical biopsy from the small bowel to determine whether this is a post transplant related lymphoproliferative disorder and what type of lymphoma this is versus any other pathology.    I will continue to chart check peripherally.  Check LDH.  Plan at this point is to hopefully transfer to the [facility] where her transplant team along with her oncology team is.  Please do not hesitate to call me if there are any questions.  We have to wait for pathology to further make recommendations of her systemic care      Elvis Lopes MD       HISTORY OF PRESENT ILLNESS: Awa is a very pleasant 56-year-old female with a history of kidney transplant at the [facility] in 2011.  She was admitted yesterday with abdominal painHer symptoms since then have resolved.   .  CT of the abdomen pelvis was completed which showed interval development of thickening of the mid/distal ileum worrisome for primary small bowel lymphoma. Dr Aguilera from surgery has seen the patient and her recommendation is transfer to the [facility] which we are awaiting.  We from the medical oncology team are asked for comanagement of this patient in light of recent findings. Awa denies any night sweats.  She states that her abdominal discomfort is doing better with some oxycodone.  She has been n.p.o. since Sunday.  She denies any unintentional weight loss.    Her oncologic history  is as follows    Sees Dr Aleman at the [facility], last seen 2/08/2022  Underwent a living unrelated [facility].  Transplant was complicated by mild rejection as well as a stenosis of the ureter  JJ stent placed for 3 months  Low-grade BK viremia  CMV viremia  Bilateral native nephrectomy due to large polycystic kidneys and a ureterostomy.  EBV negative  2013 patient was weaned off prednisone  2020 patient had chronic diarrhea and was switched from MMF to azathioprine 150 mg/day  2020 EBV DNA was 844, a 45  January 8, 2022 patient is CT of abdomen chest and pelvis few pulmonary nodules 4 mm in size.  CT showed mesenteric nodes with the largest 2.8 cm in transverse diameter.  February 2022 patient was on azathioprine 50 mg 2 times per day and tacrolimus 0.5 mg twice daily no palpable adenopathy.  Kidney biopsy February 2022 no evidence of acute rejection  Liver lesions on PET/CT query PTLD  Met with Dr Diaz from the transplant team and the plan at that time was to pursue CT-guided sampling of the mesenteric lymphadenopathy in June to rule out PTLD.  It is sampling of the mesenteric adenopathy not diagnostic she may require bowel surgery for biopsy to rule out PTLD.  Patient admitted at [facility] with abdominal discomfort and worsening CT findings.    REVIEW OF SYSTEMS:   14 point ROS was reviewed and is negative other than as noted above in HPI.       MEDICATIONS:  Current Facility-Administered Medications   Medication     acetaminophen (TYLENOL) tablet 650 mg    Or     acetaminophen (TYLENOL) Suppository 650 mg     [START ON 6/29/2022] azaTHIOprine (IMURAN) tablet 100 mg     glucose gel 15-30 g    Or     dextrose 50 % injection 25-50 mL    Or     glucagon injection 1 mg     HYDROmorphone (DILAUDID) injection 0.2 mg     insulin aspart (NovoLOG) injection (RAPID ACTING)     lidocaine (LMX4) cream     lidocaine 1 % 0.1-1 mL     melatonin tablet 1 mg     naloxone  (NARCAN) injection 0.2 mg    Or     naloxone (NARCAN) injection 0.4 mg    Or     naloxone (NARCAN) injection 0.2 mg    Or     naloxone (NARCAN) injection 0.4 mg     ondansetron (ZOFRAN ODT) ODT tab 4 mg    Or     ondansetron (ZOFRAN) injection 4 mg     oxyCODONE IR (ROXICODONE) half-tab 2.5 mg     prochlorperazine (COMPAZINE) injection 10 mg    Or     prochlorperazine (COMPAZINE) tablet 10 mg    Or     prochlorperazine (COMPAZINE) suppository 25 mg     sodium chloride (PF) 0.9% PF flush 3 mL     sodium chloride (PF) 0.9% PF flush 3 mL     sodium chloride 0.9% infusion     sulfamethoxazole-trimethoprim (BACTRIM) 400-80 MG per tablet 1 tablet     tacrolimus (GENERIC EQUIVALENT) capsule 0.5 mg     [START ON 6/29/2022] tacrolimus (GENERIC EQUIVALENT) capsule 1 mg         ALLERGIES:  Allergies   Allergen Reactions     Cefazolin Other (See Comments)     Patient tolerated ceftriaxone and ceftazidime during 1/13/2020 admission to [facility] - ceftriaxone was tolerated after possible reaction to cefazolin.  Pt describes rxn as facial swelling     Flurbiprofen Other (See Comments)     Can not take due to kidney transplant  Can not take due to kidney transplant       Nsaids      PN: LW Reaction: KIDNEY PROBLEM         PAST MEDICAL HISTORY:  Past Medical History:   Diagnosis Date     Anemia in chronic kidney disease(285.21)      Diabetes mellitus (H) 2015     Dyslipidaemia      ESRD (end stage renal disease) (H)      High risk medication use      History of basal cell cancer     nose and ear     Immunosuppressed status (H)      Kidney replaced by transplant 2011     PKD (polycystic kidney disease) 1994         PAST SURGICAL HISTORY:  Past Surgical History:   Procedure Laterality Date     APPENDECTOMY       CHOLECYSTECTOMY       CYSTOSCOPY, REMOVE STENT(S), COMBINED  1/24/2013    Procedure: COMBINED CYSTOSCOPY, REMOVE STENT(S);  Removal of Double J Stent ;  Surgeon: Manpreet Lopes MD;  Location: [facility] OR     GYN SURGERY    "   supracervical hysterectomy.  pt has ovaries     INSERT STENT URETER  10/29/2012    Procedure: INSERT STENT URETER (PRE-OP);  Replacement of Single  J Stents with Tandem Stents;  Surgeon: Shahab Silva MD;  Location: [facility] OR     NEPHRECTOMY BILATERAL  12/21/2012    Procedure: NEPHRECTOMY BILATERAL;  Bilateral Native Nephrectomy with Ureteropylostomy, Appendectomy, Cholecystectomy, Transplant Ureter Stent Placement 6fr;  Surgeon: Manpreet Mares MD;  Location: [facility] OR     PERCUTANEOUS NEPHROSTOMY  3/16/2012    Procedure:PERCUTANEOUS NEPHROSTOMY; Double J Stent Change melissa; Surgeon:GASTON LAU; Location:[facility] OR     TRANSPLANT KIDNEY RECIPIENT LIVING UNRELATED  7/22/2011    Procedure:TRANSPLANT KIDNEY RECIPIENT LIVING UNRELATED; transplanted into right iliac fossa.; Surgeon:MANPREET MARES; Location:[facility] OR         SOCIAL HISTORY:  Social History     Socioeconomic History     Marital status: Single     Spouse name: Not on file     Number of children: Not on file     Years of education: Not on file     Highest education level: Not on file   Occupational History     Not on file   Tobacco Use     Smoking status: Never Smoker     Smokeless tobacco: Never Used   Substance and Sexual Activity     Alcohol use: No     Alcohol/week: 0.0 standard drinks     Comment: \"not anymore since the transplant\"     Drug use: No     Sexual activity: Yes     Partners: Male   Other Topics Concern     Parent/sibling w/ CABG, MI or angioplasty before 65F 55M? No   Social History Narrative     Not on file     Social Determinants of Health     Financial Resource Strain: Not on file   Food Insecurity: Not on file   Transportation Needs: Not on file   Physical Activity: Not on file   Stress: Not on file   Social Connections: Not on file   Intimate Partner Violence: Not on file   Housing Stability: Not on file         FAMILY HISTORY:  Family History   Problem Relation Age of Onset     Genitourinary Problems Mother         PCKD     " "Cardiovascular Mother         brain aneurysm causing death     Genitourinary Problems Brother         PCKD     Genitourinary Problems Sister         PCKD     Diabetes Sister         on high dose steroid for kidney transplant         PHYSICAL EXAM:  Vital signs:  Temp: 97.9  F (36.6  C) Temp src: Oral BP: 104/71 Pulse: 81   Resp: 16 SpO2: 98 % O2 Device: None (Room air)   Height: 165.1 cm (5' 5\") Weight: 80.7 kg (177 lb 14.4 oz)  Estimated body mass index is 29.6 kg/m  as calculated from the following:    Height as of this encounter: 1.651 m (5' 5\").    Weight as of this encounter: 80.7 kg (177 lb 14.4 oz).    ECO  GENERAL/CONSTITUTIONAL: No acute distress.  EYES: Pupils are equal, round, and react to light and accommodation. Extraocular movements intact.  No scleral icterus.  ENT/MOUTH: Neck supple. Oropharynx clear, no mucositis.  LYMPH: No anterior cervical, posterior cervical, supraclavicular, axillary or inguinal adenopathy.   RESPIRATORY: Clear to auscultation bilaterally. No crackles or wheezing.   CARDIOVASCULAR: Regular rate and rhythm without murmurs, gallops, or rubs.  GASTROINTESTINAL: distended abdomen, bs +  MUSCULOSKELETAL: Warm and well-perfused, no cyanosis, clubbing, or edema.  NEUROLOGIC: Cranial nerves II-XII are intact. Alert, oriented, answers questions appropriately.  INTEGUMENTARY: No rashes or jaundice.  GAIT: did not assess      LABS:  CBC RESULTS:   Recent Labs   Lab Test 22  2107   WBC 4.3   RBC 3.48*   HGB 11.1*   HCT 34.1*   MCV 98   MCH 31.9   MCHC 32.6   RDW 14.6          Recent Labs   Lab Test 22  1240 22  0946 22  0748 22  2107   NA  --  135  --  132*   POTASSIUM  --  4.6  4.6  --  4.6   CHLORIDE  --  107  --  103   CO2  --  23  --  26   ANIONGAP  --  5  --  3   GLC 97 97   < > 107*   BUN  --  20  --  21   CR  --  1.42*  --  1.49*   NATASHA  --  9.0  --  9.3    < > = values in this interval not displayed.   Labs noted and reviewed "       PATHOLOGY:    na  IMAGING:    Ct cap                                                               IMPRESSION:   1.  Right lower quadrant renal transplant without stones, obstruction or adjacent free fluid. Native kidneys have been resected.     2.  Interval development of irregular thickening of a loop of mid/distal ileum, worrisome for primary small bowel lymphoma. Upstream ectasia of a few small bowel loops representing partial mechanical obstruction, free gas or free fluid. Numerous   mesenteric nodes, stable.     3.  Cholecystectomy. Hysterectomy.     4.  Findings discussed with the referring physician, Dr. Ceasar Licea, following the scan at 2335 hours.        Thank you for the opportunity to participate in this patient's care.  Please call with any questions.    Elvis Lopes MD  Hematology/Oncology  [facility]

## 2022-06-28 NOTE — PROGRESS NOTES
[facility]    Hospitalist Progress Note      Assessment & Plan   Awa Alexander is a 56 year old female who was admitted on 6/27/2022 with increased abdominal pain and is found to have a partial small bowel obstruction associated with mid to distal ileal thickening concerning for small bowel lymphoma; had already been following with [facility] nephrology with hematology curbside for concern of transplant related lymphoproliferative disease.    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  Numerous prominent, though stable mesenteric lymph nodes on CT imaging.  -General surgery consult appreciated, likely gastrografin challenge on 6/29  - NGT if worsening abd pain, emesis  -Hematology, nephrology consulted; concern here is that patient has post transplant lymphoproliferative disease in the setting of recent EBV viremia and will require significant changes in her immunosuppression regimen.  -EBV pending  -N.p.o.  -NS @100ml/hr  - Planning transfer to [place], bed may be available in AM per nephrology conversation with Dr Diaz (transplant at [facility])**     Polycystic kidney disease status post living donor kidney transplant 2011, native nephrectomies 2012: Initial transplant at the [facility].  It appears that she did have some acute BK cellular rejection briefly after transplant.  Had been doing well with transplant up until this past year where she had some increasing creatinine.  Kidney biopsy this past March was reassuring.  Initially followed at [facility], recently transferred to [facility] where her prior nephrologist is secondary to insurance coverage for recent PET scan available at [facility] though not at the [facility].  - PTA imuran and tacrolimus continued  -Nephrology consulted.  Given concern for posttransplant lymphoproliferative  "disease, there has been some discussion regarding possible changes in her immunosuppression as recently as 6/1/2022 visit with her nephrology team at [place].  -every other day bactrim continued     Type 2 diabetes: Typically on weekly Ozempic.  Hemoglobin A1c has been in the mid 5 range. HgbA1C is 6  -Medium dose sliding scale insulin every 4 hours while NPO     Elevated D-dime  On presentation, patient apparently had shortness of breath.  She has no shortness of breath, chest pain, pleuritic discomfort currently.  d-dimer was 3.18; VQ scan negative for PE, LE duplex US negative for DVT.  Suspect this lab value secondary to inflammatory condition rather than pulmonary embolism      Chronic kidney disease, stage III: Creatinine of 1.49.  On the better end of her baseline  -Normal saline at 100/h while n.p.o.  -CMP in AM    Clinically Significant Risk Factors Present on Admission             # Hypoalbuminemia: Albumin = 3.3 g/dL (Ref range: 3.4 - 5.0 g/dL) on admission, will monitor as appropriate        # Overweight: Estimated body mass index is 29.6 kg/m  as calculated from the following:    Height as of this encounter: 1.651 m (5' 5\").    Weight as of this encounter: 80.7 kg (177 lb 14.4 oz).          DVT Prophylaxis: Pneumatic Compression Devices  Code Status: Full Code     Expected Discharge Date: 06/30/2022               Ivonne Pettit DO  Hospitalist Service    Securely message with the Vocera Web Console (learn more here)  Text Page (7am - 6pm) via [facility] Paging/Directory      Interval History   Patient seen and examined. She denies nausea, BM. She has diffuse pain in her abdomen at rest, but it is improved with oxycodone. She would like transfer to [place] if able, discussed that this may not happen right away.  Spent 35 minutes with >50% time spent evaluating patient, reviewing chart. Attempting to coordinate transfer to [place], updating specialist/RN or plan.    -Data reviewed " today: I reviewed all new labs and imaging results over the last 24 hours. I personally reviewed VQ scan negative for PE    Physical Exam   Temp: 98.1  F (36.7  C) Temp src: Oral BP: 123/80 Pulse: 76   Resp: 17 SpO2: 99 % O2 Device: None (Room air)    Vitals:    06/27/22 1509 06/28/22 0758   Weight: 79.4 kg (175 lb) 80.7 kg (177 lb 14.4 oz)     Vital Signs with Ranges  Temp:  [97.9  F (36.6  C)-98.2  F (36.8  C)] 98.1  F (36.7  C)  Pulse:  [76-91] 76  Resp:  [16-20] 17  BP: (104-142)/(71-86) 123/80  SpO2:  [95 %-99 %] 99 %  I/O last 3 completed shifts:  In: 1768 [P.O.:120; I.V.:1648]  Out: -     Constitutional: Awake, alert, cooperative, no apparent distress, sitting up at bedside  Respiratory: Clear to auscultation bilaterally, no crackles or wheezing  Cardiovascular: Regular rate and rhythm, normal S1 and S2, and no murmur noted  GI: Normal bowel sounds, soft, mildly distended, tender in lower quadrants to deep palpation.  Skin/Integumen: No rashes, no cyanosis, no edema  Other:     Medications     sodium chloride 100 mL/hr at 06/28/22 0748       [START ON 6/29/2022] azaTHIOprine  100 mg Oral QAM     insulin aspart  1-6 Units Subcutaneous Q4H     sodium chloride (PF)  3 mL Intracatheter Q8H     sulfamethoxazole-trimethoprim  1 tablet Oral Every Other Day     tacrolimus  0.5 mg Oral QPM     [START ON 6/29/2022] tacrolimus  1 mg Oral QAM       Data   Recent Labs   Lab 06/28/22  1240 06/28/22  0946 06/28/22  0748 06/27/22  2107   WBC  --   --   --  4.3   HGB  --   --   --  11.1*   MCV  --   --   --  98   PLT  --   --   --  267   NA  --  135  --  132*   POTASSIUM  --  4.6  4.6  --  4.6   CHLORIDE  --  107  --  103   CO2  --  23  --  26   BUN  --  20  --  21   CR  --  1.42*  --  1.49*   ANIONGAP  --  5  --  3   NATASHA  --  9.0  --  9.3   GLC 97 97 89 107*   ALBUMIN  --   --   --  3.3*   PROTTOTAL  --   --   --  10.9*   BILITOTAL  --   --   --  0.4   ALKPHOS  --   --   --  95   ALT  --   --   --  22   AST  --   --   --   32       Recent Results (from the past 24 hour(s))   US Lower Extremity Venous Duplex Bilateral    Narrative    EXAM: US LOWER EXTREMITY VENOUS DUPLEX BILATERAL  LOCATION: [facility]  DATE/TIME: 6/27/2022 10:23 PM    INDICATION: Pain and swelling.  COMPARISON: None.  TECHNIQUE: Venous Duplex ultrasound of bilateral lower extremities with and without compression, augmentation and duplex. Color flow and spectral Doppler with waveform analysis performed.    FINDINGS: Exam includes the common femoral, femoral, popliteal veins as well as segmentally visualized deep calf veins and greater saphenous vein.     RIGHT: No deep vein thrombosis. No superficial thrombophlebitis. No popliteal cyst.    LEFT: No deep vein thrombosis. No superficial thrombophlebitis. No popliteal cyst.      Impression    IMPRESSION:  1.  No deep venous thrombosis in the bilateral lower extremities.   Abd/pelvis CT no contrast - Stone Protocol    Narrative    EXAM: CT ABDOMEN PELVIS W/O CONTRAST  LOCATION: [facility]  DATE/TIME: 6/27/2022 10:42 PM    INDICATION: LLQ pain. History of renal transplant, refusing contrast.  COMPARISON: CT chest, abdomen and pelvis without IV contrast 1/28/2022.  TECHNIQUE: CT scan of the abdomen and pelvis was performed without IV contrast. Multiplanar reformats were obtained. Dose reduction techniques were used.  CONTRAST: None.    FINDINGS:   LOWER CHEST: Clear. No pleural effusion on either side. Normal cardiac size. No pericardial effusion.    HEPATOBILIARY: Cholecystectomy. No discrete hepatic lesion.    PANCREAS: Normal.    SPLEEN: Normal.    ADRENAL GLANDS: Normal.    KIDNEYS/BLADDER: Native kidneys have been resected. Right lower quadrant renal transplant without stones, obstruction or adjacent free fluid.    BOWEL: Interval development of irregular thickening of a loop of mid and distal ileum (images 113-148, series 4, images 119-36, series 5, images 38-60,  series 6), several loops of upstream small bowel extending over a length of approximately 15 cm   (images 20-31, series 5), worrisome for primary small bowel lymphoma. Upstream ectasia of a few small bowel loops representing partial mechanical obstruction. No free gas or free fluid. Formed stool material within normal caliber colon.    LYMPH NODES: Numerous prominent mesenteric nodes, a representative node in the left abdomen measures 2.7 x 1.5 cm (image 85, series 4), unchanged. Another node in the mesentery measures 1.9 x 1.1 cm, also stable.    VASCULATURE: Atherosclerotic normal caliber distal abdominal aorta measuring 1.8 x 1.8 cm (image 104, series 4). Normal caliber IVC.    PELVIC ORGANS: Right lower quadrant renal transplant without stones, obstruction or adjacent free fluid. Irregular thickening of a short segment of the mid/distal ileum. No pelvic sidewall or inguinal adenopathy. Vascular calcifications. No free fluid.   Hysterectomy. Phleboliths.    MUSCULOSKELETAL: Mild degenerative changes involving the spine and pelvis. Slight right convex lumbar curve.      Impression    IMPRESSION:   1.  Right lower quadrant renal transplant without stones, obstruction or adjacent free fluid. Native kidneys have been resected.    2.  Interval development of irregular thickening of a loop of mid/distal ileum, worrisome for primary small bowel lymphoma. Upstream ectasia of a few small bowel loops representing partial mechanical obstruction, free gas or free fluid. Numerous   mesenteric nodes, stable.    3.  Cholecystectomy. Hysterectomy.    4.  Findings discussed with the referring physician, Dr. Ceasar Licea, following the scan at 2335 hours.    NOTE: ABNORMAL REPORT    THE DICTATION ABOVE DESCRIBES AN ABNORMALITY FOR WHICH FOLLOW-UP IS NEEDED.     XR Chest 2 Views    Narrative    EXAM: XR CHEST 2 VW  LOCATION: [facility]  DATE/TIME: 6/27/2022 10:45 PM    INDICATION: Shortness of  breath.  COMPARISON: Chest x-ray single view 3/16/2012.      Impression    IMPRESSION: Resolved plate-like atelectasis in the right lower lung seen previously. Both lungs are clear today. No adenopathy. Normal cardiac size and pulmonary vascularity. Mildly atherosclerotic thoracic aorta. Mild degenerative changes both shoulders   and the spine. Cholecystectomy clips.   NM Lung Scan Perfusion Particulate    Narrative    EXAM: NM LUNG SCAN PERFUSION PARTICULATE  LOCATION: [facility]  DATE/TIME: 6/28/2022 7:37 AM    INDICATION: dyspnea, chest pain, elevated D dimer (has bowel obstruction, possible lymphoma, prior renal transplant)  COMPARISON: CXR 06/27/2022 reviewed.  TECHNIQUE: 3.6 mCi technetium-99m MAA, IV. Standard lung perfusion imaging.    FINDINGS: Normal perfusion to both lungs. No segmental perfusion defects.      Impression    IMPRESSION:   Negative for pulmonary embolism.

## 2022-06-28 NOTE — CONSULTS
[facility]    RENAL CONSULTATION NOTE    REFERRING MD:  Dr. Navas    REASON FOR CONSULTATION:  Kidney tx, possible PTLD    DATE OF CONSULTATION: 06/28/22    SHORTHAND KEY FOR MY NOTES:  c = with, s = without, p = after, a = before, x = except, asx = asymptomatic, tx = transplant or treatment, sx = symptoms or symptomatic, cx = canceled or culture, rxn = reaction, yday = yesterday, nl = normal, abx = antibiotics, fxn = function, dx = diagnosis, dz = disease, m/h = melena/hematochezia, c/d/l/ha = cramping/dizziness/lightheadedness/headache, d/c = discharge or diarrhea/constipation, f/c/n/v = fevers/chills/nausea/vomiting, cp/sob = chest pain/shortness of breath, tbv = total body volume, rxn = reaction, tdc = tunneled dialysis catheter, pta = prior to admission, hd = hemodialysis, pd = peritoneal dialysis, hhd = home hemodialysis, edw = estimated dry wt    HPI: Awa Alexander is a 56 year old female c ESKD 2 PCKD s/p LDKT (friend - 2011) who was admitted on 6/27/2022 c abd pain and found to have an SBO 2 bowel mass.    Pt developed LLQ / epigastric abd pain ~5d ago and it has been progressively worsening.  Yday, she developed a fever and noticed she was passing less gas.  She did have a nl BM on Sunday, but didn't have one yday.  Pt has known bowel thickening and is being followed at [place].  She has had a R hepatic lobe bx of a mass on 6/6/22.  Results are pending, per the pt.      She had some SOB yday and had a w/u for PE that was neg.  She had a CT scan done that showed thickening of the mid-distal ileum and small bowel measuring ~15 cm.  Surg has seen her and may get a gastrografin study tmrw if she hasn't been transferred to [place].    Pt's baseline cr is 1.6-1.8 and was 1.5 yday.  She is NPO, so is on IVF now.  She is urinating well and has no UTI sx.  No uremic sx, though her appetite has been lower than usual due to the pain.    ROS:  A complete review of systems was performed and is x as  noted above.    PMH:    Past Medical History:   Diagnosis Date     Anemia in chronic kidney disease(285.21)      Diabetes mellitus (H) 2015     Dyslipidaemia      ESRD (end stage renal disease) (H)      High risk medication use      History of basal cell cancer     nose and ear     Immunosuppressed status (H)      Kidney replaced by transplant 2011     PKD (polycystic kidney disease) 1994     PSH:    Past Surgical History:   Procedure Laterality Date     APPENDECTOMY       CHOLECYSTECTOMY       CYSTOSCOPY, REMOVE STENT(S), COMBINED  1/24/2013    Procedure: COMBINED CYSTOSCOPY, REMOVE STENT(S);  Removal of Double J Stent ;  Surgeon: Manpreet Mares MD;  Location: [facility] OR     GYN SURGERY      supracervical hysterectomy.  pt has ovaries     INSERT STENT URETER  10/29/2012    Procedure: INSERT STENT URETER (PRE-OP);  Replacement of Single  J Stents with Tandem Stents;  Surgeon: Shahab Silva MD;  Location: [facility] OR     NEPHRECTOMY BILATERAL  12/21/2012    Procedure: NEPHRECTOMY BILATERAL;  Bilateral Native Nephrectomy with Ureteropylostomy, Appendectomy, Cholecystectomy, Transplant Ureter Stent Placement 6fr;  Surgeon: Manpreet Mares MD;  Location: [facility] OR     PERCUTANEOUS NEPHROSTOMY  3/16/2012    Procedure:PERCUTANEOUS NEPHROSTOMY; Double J Stent Change melissa; Surgeon:GASTON LAU; Location:[facility] OR     TRANSPLANT KIDNEY RECIPIENT LIVING UNRELATED  7/22/2011    Procedure:TRANSPLANT KIDNEY RECIPIENT LIVING UNRELATED; transplanted into right iliac fossa.; Surgeon:MANPREET MARES; Location:[facility] OR     MEDICATIONS:      [START ON 6/29/2022] azaTHIOprine  100 mg Oral QAM     insulin aspart  1-6 Units Subcutaneous Q4H     sodium chloride (PF)  3 mL Intracatheter Q8H     sulfamethoxazole-trimethoprim  1 tablet Oral Every Other Day     tacrolimus  0.5 mg Oral QPM     [START ON 6/29/2022] tacrolimus  1 mg Oral QAM     ALLERGIES:    Allergies as of 06/27/2022 - Reviewed 06/27/2022   Allergen Reaction Noted     Cefazolin Other  "(See Comments) 01/15/2020     Flurbiprofen Other (See Comments) 08/13/2018     Nsaids  02/13/2003     FH:    Family History   Problem Relation Age of Onset     Genitourinary Problems Mother         PCKD     Cardiovascular Mother         brain aneurysm causing death     Genitourinary Problems Brother         PCKD     Genitourinary Problems Sister         PCKD     Diabetes Sister         on high dose steroid for kidney transplant     SH:    Social History     Socioeconomic History     Marital status: Single     Spouse name: Not on file     Number of children: Not on file     Years of education: Not on file     Highest education level: Not on file   Occupational History     Not on file   Tobacco Use     Smoking status: Never Smoker     Smokeless tobacco: Never Used   Substance and Sexual Activity     Alcohol use: No     Alcohol/week: 0.0 standard drinks     Comment: \"not anymore since the transplant\"     Drug use: No     Sexual activity: Yes     Partners: Male   Other Topics Concern     Parent/sibling w/ CABG, MI or angioplasty before 65F 55M? No   Social History Narrative     Not on file     Social Determinants of Health     Financial Resource Strain: Not on file   Food Insecurity: Not on file   Transportation Needs: Not on file   Physical Activity: Not on file   Stress: Not on file   Social Connections: Not on file   Intimate Partner Violence: Not on file   Housing Stability: Not on file     PHYSICAL EXAM:    /71 (BP Location: Left arm)   Pulse 81   Temp 97.9  F (36.6  C) (Oral)   Resp 16   Ht 1.651 m (5' 5\")   Wt 80.7 kg (177 lb 14.4 oz)   SpO2 98%   BMI 29.60 kg/m      GENERAL: awake, alert, NAD  HEENT:  normocephalic, no gross abnormalities; MMM, dentition is ok; pupils equal, EOMI, no scleral icterus or conj edema  CV: RRR, nl S1/S2; no significant ble edema  RESP: CTA B c good efforts; no crackles, rhonchi, wheezes  GI: abd distended, tender in LLQ/epi regions, decreased BS; RLQ tx site - " non-tender, no warmth  MUSCULOSKELETAL: extremities nl - no gross deformities noted  SKIN: no suspicious lesions or rashes, dry to touch  NEURO:  strength normal and symmetric  PSYCH: mood good, affect appropriate  LYMPH: no palpable ant/post cervical and supraclavicular adenopathy  LINES:  + PIV    LABS:      CBC RESULTS:     Recent Labs   Lab 06/27/22 2107   WBC 4.3   RBC 3.48*   HGB 11.1*   HCT 34.1*        BMP RESULTS:  Recent Labs   Lab 06/28/22  1240 06/28/22  0946 06/28/22  0748 06/27/22 2107   NA  --  135  --  132*   POTASSIUM  --  4.6  4.6  --  4.6   CHLORIDE  --  107  --  103   CO2  --  23  --  26   BUN  --  20  --  21   CR  --  1.42*  --  1.49*   GLC 97 97 89 107*   NATASHA  --  9.0  --  9.3     INRNo lab results found in last 7 days.     DIAGNOSTICS:  Personally reviewed - abd CT (thickened bowel, tx kidney looks ok), CXR (clear)    A/P:  Awa Alexander is a 56 year old female c ESKD s/p LDKT, known bowel thickening / liver mass who has an SBO.    1.  ESKD s/p LDKT.  Pt is on aza and tac.  She also takes Bactrim 3x/wk.  Her cr is a bit better than baseline, in part due to fluids.  She doesn't have any uremic sx and TBV is good.    A.  Continue same doses of tac/aza.  B.  LM for Dr. Diaz to discuss her case.    2.  SBO 2 bowel mass.  Pt is undergoing a w/u for this at [place] and she prefers it continue there.  Gen Surg is involved to help if she is not able to transfer.  A.  Agree c [place] transfer when able.  B.  Gastrografin study tmrw.    3.  Anemia.  Hb is stable as compared to recent labs on 6/6 at [place].  No signs of bleeding.  A.  Follow hb, clinically.    4.  Elevated total protein.  Her TProt is 10.9 compared to an alb of 3.3.  Reviewed records from [place] and it was ~11 there, too.  This is prob related to the mass that has been found.  A.  No further w/u at this time.    5.  FEN.  Electrolytes are ok.  She is NPO at present and on IVF.  A.  Continue IVF.    Thank you for this consultation. We  will follow c you.  Please call if any questions.    Attestation:   I have reviewed today's relevant vital signs, notes, medications, labs and imaging.    Oleksandr Andrade MD  [facility] - Nephrology  [phone]

## 2022-06-28 NOTE — PROGRESS NOTES
Spoke to Dr. Molly Diaz ([place] [facility]) and Dr. Pettit (hospitalist).    He will arrange transfer to [place] tmw AM.

## 2022-06-28 NOTE — CONSULTS
[facility] General Surgery Consultation    Awa Alexander MRN# [id number]   YOB: 1965 Age: 56 year old      Date of Admission:  6/27/2022  Date of Consult: 6/28/2022         Assessment and Plan:   Patient is a 56 year old female with h/o kidney transplant 2011 and bilateral nephrectomies 2012with appendectomy and cholecystectomy who presents with abdominal pain and CT evidence of small bowel obstruction due to a possible mass in the mid to distal ileum. I discussed options with the patient, at this time no indication for emergent surgical intervention (no peritonitis, no evidence of bowel ischemia or perforation on imaging, normal WBC) and I would ultimately recommend transfer to [place] (which the patient would prefer) for ongoing management and possible need for OR for definitive diagnosis of the small bowel mass.     PLAN:  Recommend transfer to [facility] for ongoing cares  If pt unable to transfer will consider gastrograffin challenge tomorrow to see if obstruction resolving  Place NGT if worsening pain, distention, nausea or emesis         Requesting Physician:      Dr. Navas        Chief Complaint:     Chief Complaint   Patient presents with     Abdominal Pain          History of Present Illness:   Awa Alexander is a 56 year old female who was seen in consultation at the request of Dr. Navas who presented with h/o kidney transplant 2011 and bilateral nephrectomies 2012with appendectomy and cholecystectomy who presents with abdominal pain and CT evidence of small bowel obstruction due to a possible mass in the mid to distal ileum. She reports pain began yesterday and was very severe. It worsened throughout the day and she called the RN triage line and was recommended to present to the ER. She denies nausea, pain is generalized. Last bm was Sunday. No melena or BRBPR. Currently pain improved after oxycodone. Prior surgeries as above.  WBC 4.3, hgb 11.1, creatinine 1.49.           Physical  "Exam:   Blood pressure 104/71, pulse 81, temperature 97.9  F (36.6  C), temperature source Oral, resp. rate 16, height 1.651 m (5' 5\"), weight 80.7 kg (177 lb 14.4 oz), SpO2 98 %.  177 lbs 14.4 oz  General: lying in bed, appears comfortable  Psych: Alert and Oriented.  Normal affect  Neurological: grossly intact  Eyes: Sclera clear  Respiratory:  nonlabored breathing  Cardiovascular:  Regular Rate and Rhythm   GI: soft, mildly distended, tender to deep palpation in mid abdomen and suprapubic. No rebound or guarding.    Lymphatic/Hematologic/Immune:  No femoral or cervical lymphadenopathy.  Integumentary:  No rashes         Past Medical History:     Past Medical History:   Diagnosis Date     Anemia in chronic kidney disease(285.21)      Diabetes mellitus (H) 2015     Dyslipidaemia      ESRD (end stage renal disease) (H)      High risk medication use      History of basal cell cancer     nose and ear     Immunosuppressed status (H)      Kidney replaced by transplant 2011     PKD (polycystic kidney disease) 1994            Past Surgical History:     Past Surgical History:   Procedure Laterality Date     APPENDECTOMY       CHOLECYSTECTOMY       CYSTOSCOPY, REMOVE STENT(S), COMBINED  1/24/2013    Procedure: COMBINED CYSTOSCOPY, REMOVE STENT(S);  Removal of Double J Stent ;  Surgeon: Manpreet Lopes MD;  Location: [facility] OR     GYN SURGERY      supracervical hysterectomy.  pt has ovaries     INSERT STENT URETER  10/29/2012    Procedure: INSERT STENT URETER (PRE-OP);  Replacement of Single  J Stents with Tandem Stents;  Surgeon: Shahab Silva MD;  Location: [facility] OR     NEPHRECTOMY BILATERAL  12/21/2012    Procedure: NEPHRECTOMY BILATERAL;  Bilateral Native Nephrectomy with Ureteropylostomy, Appendectomy, Cholecystectomy, Transplant Ureter Stent Placement 6fr;  Surgeon: Manpreet Lopes MD;  Location: [facility] OR     PERCUTANEOUS NEPHROSTOMY  3/16/2012    Procedure:PERCUTANEOUS NEPHROSTOMY; Double J Stent Change melissa; " Surgeon:GASTNO LAU; Location:[facility] OR     TRANSPLANT KIDNEY RECIPIENT LIVING UNRELATED  7/22/2011    Procedure:TRANSPLANT KIDNEY RECIPIENT LIVING UNRELATED; transplanted into right iliac fossa.; Surgeon:ZORA MARES; Location:[facility] OR            Current Medications:           [START ON 6/29/2022] azaTHIOprine  100 mg Oral QAM     insulin aspart  1-6 Units Subcutaneous Q4H     sodium chloride (PF)  3 mL Intracatheter Q8H     sulfamethoxazole-trimethoprim  1 tablet Oral Every Other Day     tacrolimus  0.5 mg Oral QPM     [START ON 6/29/2022] tacrolimus  1 mg Oral QAM       acetaminophen **OR** acetaminophen, glucose **OR** dextrose **OR** glucagon, HYDROmorphone, lidocaine 4%, lidocaine (buffered or not buffered), melatonin, naloxone **OR** naloxone **OR** naloxone **OR** naloxone, ondansetron **OR** ondansetron, oxyCODONE, prochlorperazine **OR** prochlorperazine **OR** prochlorperazine, sodium chloride (PF)         Home Medications:     Prior to Admission medications    Medication Sig Last Dose Taking? Auth Provider Long Term End Date   azaTHIOprine (IMURAN) 50 MG tablet Take 100 mg by mouth every morning 6/27/2022 at AM Yes Unknown, Entered By History No    cholecalciferol (VITAMIN  -D) 1000 UNITS capsule Take 5 capsules (5,000 Units) by mouth daily  Patient taking differently: Take 1 capsule by mouth daily 6/27/2022 at AM Yes Sola Enriquez MD     fish oil-omega-3 fatty acids 1000 MG capsule Take 1 g by mouth 2 times daily 6/27/2022 at AM Yes Reported, Patient     OZEMPIC, 1 MG/DOSE, 2 MG/1.5ML pen Inject 0.5 mg Subcutaneous every 7 days Every Sunday. 6/26/2022 at AM Yes Reported, Patient     sodium bicarbonate 650 MG tablet Take 1 tablet (650 mg) by mouth 2 times daily  Patient taking differently: Take 650 mg by mouth 2 times daily as needed (low bicarbonate on labs) More than a month at Unknown time Yes Lino Levi MD     sulfamethoxazole-trimethoprim (BACTRIM) 400-80 MG tablet Take 1 tablet  by mouth every other day 6/26/2022 at AM Yes Lino Levi MD Yes    tacrolimus (GENERIC EQUIVALENT) 0.5 MG capsule Take 1 capsule (0.5 mg) by mouth 2 times daily  Patient taking differently: Take 0.5 mg by mouth every evening 6/26/2022 at PM Yes Lino Levi MD Yes    tacrolimus (GENERIC EQUIVALENT) 1 MG capsule Take 1 mg by mouth every morning 6/27/2022 at AM Yes Unknown, Entered By History Yes    blood glucose (PHUONG CONTOUR NEXT) test strip Use to test blood sugar three times daily or as directed.  Patient not taking: No sig reported   Nellie Emerson PA-C              Allergies:     Allergies   Allergen Reactions     Cefazolin Other (See Comments)     Patient tolerated ceftriaxone and ceftazidime during 1/13/2020 admission to [facility] - ceftriaxone was tolerated after possible reaction to cefazolin.  Pt describes rxn as facial swelling     Flurbiprofen Other (See Comments)     Can not take due to kidney transplant  Can not take due to kidney transplant       Nsaids      PN: LW Reaction: KIDNEY PROBLEM            Family History:     Family History   Problem Relation Age of Onset     Genitourinary Problems Mother         PCKD     Cardiovascular Mother         brain aneurysm causing death     Genitourinary Problems Brother         PCKD     Genitourinary Problems Sister         PCKD     Diabetes Sister         on high dose steroid for kidney transplant           Social History:   Awa Alexander  reports that she has never smoked. She has never used smokeless tobacco. She reports that she does not drink alcohol and does not use drugs.          Review of Systems:   The 10 point Review of Systems is negative other than noted in the HPI.         Labs/Imaging   All new lab and imaging data was reviewed.   I have personally reviewed the imaging studies including her CT.         Ankita Aguilera MD

## 2022-06-28 NOTE — PLAN OF CARE
NPO. Up ind in room and halls. IVF infusing.Voiding. No flatus at this time.   Abdominal pain, oxy with relief. Lung scan today, negative.   Plan transfer for [facility]--call has been made but no beds at this time. Surgery/Neph/Onc following.

## 2022-06-28 NOTE — PROGRESS NOTES
Problem:  SBO  Vitals and oxygen: VSS on RA  Orientation/Neuro: A&Ox4  Activity Level: independent  Diet: NPO  Comorbidity: DM2, HTN, R. Kidney transplant, immunosuppression   Labs: sodium 132, creatinine 1.49, GFR 41, albumin 3.3, HgB 11.1  IV Fluids/Drains/Tubes: IVF infusing NS at 100ml/hr to R arm   Tests/Procedures:   Pain Management: pain managed with PRN APAP this shift with PRN oxycodone and dilaudid available  Skin: WDL  GI/: RUQ/RLQ BS hypoactive, LLQ/LUQ BS not audible. abd soft/tender, distended, no flatus/stool, and is voiding spontaneously.    Respiratory: WDL  Cardio: WDL  Plan: anticipate discharge home 3-5 days pending return of bowel function; possible need for procedural intervention for tissue or bowel resection  Other: patient reports she does not monitor BG and does not take insulin. She declined a BG check and therefore sl;iding scale insulin.

## 2022-06-29 VITALS
SYSTOLIC BLOOD PRESSURE: 116 MMHG | RESPIRATION RATE: 16 BRPM | HEIGHT: 65 IN | OXYGEN SATURATION: 99 % | WEIGHT: 177.5 LBS | BODY MASS INDEX: 29.57 KG/M2 | HEART RATE: 86 BPM | TEMPERATURE: 97.8 F | DIASTOLIC BLOOD PRESSURE: 81 MMHG

## 2022-06-29 LAB
BACTERIA UR CULT: NORMAL
GLUCOSE BLDC GLUCOMTR-MCNC: 71 MG/DL (ref 70–99)
GLUCOSE BLDC GLUCOMTR-MCNC: 82 MG/DL (ref 70–99)

## 2022-06-29 PROCEDURE — 250N000013 HC RX MED GY IP 250 OP 250 PS 637: Performed by: INTERNAL MEDICINE

## 2022-06-29 PROCEDURE — 250N000011 HC RX IP 250 OP 636: Performed by: INTERNAL MEDICINE

## 2022-06-29 PROCEDURE — 99239 HOSP IP/OBS DSCHRG MGMT >30: CPT | Performed by: HOSPITALIST

## 2022-06-29 PROCEDURE — 250N000012 HC RX MED GY IP 250 OP 636 PS 637: Performed by: HOSPITALIST

## 2022-06-29 RX ADMIN — ACETAMINOPHEN 650 MG: 325 TABLET ORAL at 07:43

## 2022-06-29 RX ADMIN — TACROLIMUS 1 MG: 1 CAPSULE ORAL at 07:45

## 2022-06-29 RX ADMIN — ONDANSETRON 4 MG: 2 INJECTION INTRAMUSCULAR; INTRAVENOUS at 08:15

## 2022-06-29 RX ADMIN — HYDROMORPHONE HYDROCHLORIDE 0.2 MG: 0.2 INJECTION, SOLUTION INTRAMUSCULAR; INTRAVENOUS; SUBCUTANEOUS at 02:11

## 2022-06-29 RX ADMIN — ACETAMINOPHEN 650 MG: 325 TABLET ORAL at 01:11

## 2022-06-29 RX ADMIN — AZATHIOPRINE 100 MG: 50 TABLET ORAL at 07:43

## 2022-06-29 ASSESSMENT — ACTIVITIES OF DAILY LIVING (ADL)
ADLS_ACUITY_SCORE: 20

## 2022-06-29 NOTE — PLAN OF CARE
Problem: SBO, likely from mass  Hx: Kidney Tx  Vitals: VSS on RA  Orientation/Neuro: A/Ox4, pleasant  Activity Level: Ind  Diet: NPO with ice chips  GI: not passing flatus, c/o nausea, Zofran given; effective  : Voiding adequately/spontaneously  Labs: BG checks Q4hrs  IV Fluids/Drains/Tubes: PIV NS 100ml/hr  Pain Management: Pain managed with PRN oxy x1, PRN Tylenol x1  Skin: WDL  Plan: Pt will transfer to [place] tomorrow 8:30AM

## 2022-06-29 NOTE — PLAN OF CARE
Goal Outcome Evaluation:    Patient discharging to [place]. AVS gone over with patient in room. All Questions answered. Pt escorted out by NA. Sister is driving her to [place]. Pt belongings packed and with pt. [place] nurse given report.

## 2022-06-29 NOTE — PLAN OF CARE
Goal Outcome Evaluation:NPO. VSS on RA. BS hypo. Up ind in room and halls independently Denies nausea. IVF infusing.Voiding. Pain in abdomen managed with oxy & Tylenol x1, with relief.  Plan transfer for [place] this morning at 0830, transport provided by pt sister. [place] nurse given report late last night, may call again in am.    Plan of Care Reviewed With: patient     Overall Patient Progress: no change

## 2022-06-29 NOTE — DISCHARGE SUMMARY
[facility]    Discharge Summary  Hospitalist    Date of Admission:  6/27/2022  Date of Discharge:  6/29/2022  Discharging Provider: Ivonne Pettit DO  Date of Service (when I saw the patient): 06/29/22    Discharge Diagnoses   Partial small bowel obstruction  Polycystic kidney disease status post living donor kidney transplant 2011, native nephrectomies 2012  Type 2 diabetes  Elevated D-dimer  Chronic kidney disease, stage III    History of Present Illness   Awa Alexander is an 56 year old female who presented with increased abdominal pain and is found to have a partial small bowel obstruction associated with mid to distal ileal thickening 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    Hospital Course   Awa Alexander was admitted on 6/27/2022.  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  Numerous prominent, though stable mesenteric lymph nodes on CT imaging.  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  -EBV 391k (less than in March 20220)  -N.p.o except meds  - discharge to [facility] this morning, she will transfer via private vehicle     Polycystic kidney disease status post living donor kidney transplant 2011, native nephrectomies 2012: Initial transplant at the [facility].  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  Had been doing well with transplant up until this past year where she had some increasing creatinine.  Kidney biopsy this past March was reassuring.  Initially followed at [facility], recently transferred to [facility] where her prior nephrologist is secondary to insurance coverage for recent PET scan available at [place] though not at the [facility].  - PTA imuran and tacrolimus continued  -Nephrology consulted  -every other day bactrim continued     Type 2 diabetes: Typically on weekly Ozempic.  Hemoglobin A1c has been in the mid 5 range. HgbA1C is 6  -Medium dose sliding scale insulin every 4 hours given while NPO     Elevated D-dimer  On presentation, patient apparently had shortness of breath.  She has no shortness of breath, chest pain, pleuritic discomfort currently.  d-dimer was 3.18; VQ scan negative for PE, LE duplex US negative for DVT.  Suspect this lab value secondary to inflammatory condition rather than pulmonary embolism      Chronic kidney disease, stage III: Creatinine of 1.49.  On the better end of her baseline  -received IVF while inpatient, disconnected on discharge. AM labs not drawn prior to discharg    Ivonne Pettit, DO    Significant Results and Procedures   See above    Pending Results   NA    Code Status   Full Code       Primary Care Physician   Moisés Lopes    Physical Exam   Temp: 97.8  F (36.6  C) Temp src: Oral BP: 116/81 Pulse: 86   Resp: 16 SpO2: 99 % O2 Device: None (Room air)    Vitals:    06/27/22 1509 06/28/22 0758 06/29/22 0530   Weight: 79.4 kg (175 lb) 80.7 kg (177 lb 14.4 oz) 80.5 kg (177 lb 8 oz)     Vital Signs with Ranges  Temp:  [97.8  F (36.6  C)-98.1  F (36.7  C)] 97.8  F (36.6  C)  Pulse:  [76-86] 86  Resp:  [16-17] 16  BP: (104-123)/(71-82) 116/81  SpO2:  [98 %-99 %] 99 %  I/O last 3 completed shifts:  In: 1768 [P.O.:120; I.V.:1648]  Out: -     Patient seen and examined on day of discharge. Had some retching last night, but no vomiting. Discussed  zofran IV prior to IV removal and discharge. She would like tylenol oral prior to leaving as well. Sister picking her up and driving her to admissions at [facility]. Stable for discharge with readmission at [facility] in 2 hours.    Constitutional: Awake, alert, cooperative, no apparent distress.  Eyes: Conjunctiva and pupils examined and normal.  HEENT: Moist mucous membranes, normal dentition.  Respiratory: Clear to auscultation bilaterally, no crackles or wheezing.  Cardiovascular: Regular rate and rhythm, normal S1 and S2, and no murmur noted.  GI: Soft, non-distended, tender low abdomen, normal bowel sounds.  Lymph/Hematologic: No anterior cervical or supraclavicular adenopathy.  Skin: No rashes, no cyanosis, no edema.  Musculoskeletal: No joint swelling, erythema or tenderness.  Neurologic: Cranial nerves 2-12 intact, normal strength and sensation.  Psychiatric: Alert, oriented to person, place and time, no obvious anxiety or depression.    Discharge Disposition   Discharged to private vehicle with plan to directly admit to [facility]  Condition at discharge: Stable    Consultations This Hospital Stay   HEMATOLOGY & ONCOLOGY IP CONSULT  SURGERY GENERAL IP CONSULT  NEPHROLOGY IP CONSULT    Time Spent on this Encounter   IIvonne DO, personally saw the patient today and spent greater than 30 minutes discharging this patient.    Discharge Orders      Reason for your hospital stay    Partial small bowel obstruction     Glucose monitor nursing POCT    Before meals and at bedtime     Activity - Up ad abhilash     Follow Up and recommended labs and tests    Follow up with [facility] specialists    Go to [facility], 1st floor admissions     Full Code     Diet    Follow this diet upon discharge: Nothing to eat or drink except medications     Discharge Medications   Current Discharge Medication List      CONTINUE these medications which have NOT CHANGED    Details   azaTHIOprine (IMURAN) 50 MG tablet Take 100  mg by mouth every morning      cholecalciferol (VITAMIN  -D) 1000 UNITS capsule Take 5 capsules (5,000 Units) by mouth daily  Qty: 450 capsule, Refills: 1    Associated Diagnoses: Low vitamin D level      fish oil-omega-3 fatty acids 1000 MG capsule Take 1 g by mouth 2 times daily      OZEMPIC, 1 MG/DOSE, 2 MG/1.5ML pen Inject 0.5 mg Subcutaneous every 7 days Every Sunday.      sodium bicarbonate 650 MG tablet Take 1 tablet (650 mg) by mouth 2 times daily  Qty: 180 tablet, Refills: 3    Associated Diagnoses: Aftercare following organ transplant; Diarrhea; Metabolic acidosis      sulfamethoxazole-trimethoprim (BACTRIM) 400-80 MG tablet Take 1 tablet by mouth every other day  Qty: 45 tablet, Refills: 3    Associated Diagnoses: Immunosuppressed status (H); Kidney transplanted; S/P kidney transplant; Low vitamin D level; Kidney replaced by transplant; Encounter for long-term current use of medication      tacrolimus (GENERIC EQUIVALENT) 0.5 MG capsule Take 1 capsule (0.5 mg) by mouth 2 times daily  Qty: 180 capsule, Refills: 3    Comments: TXP DT 7/22/2011 (Kidney) TXP Dischg DT 7/26/2011 DX Kidney replaced by transplant Z94.0 [facility] ([place], [place])  Associated Diagnoses: Kidney transplanted; Encounter for long-term current use of medication      tacrolimus (GENERIC EQUIVALENT) 1 MG capsule Take 1 mg by mouth every morning      blood glucose (PHUONG CONTOUR NEXT) test strip Use to test blood sugar three times daily or as directed.  Qty: 100 strip, Refills: prn    Associated Diagnoses: Type 2 diabetes, HbA1c goal < 7% (H)           Allergies   Allergies   Allergen Reactions     Cefazolin Other (See Comments)     Patient tolerated ceftriaxone and ceftazidime during 1/13/2020 admission to [facility] - ceftriaxone was tolerated after possible reaction to cefazolin.  Pt describes rxn as facial swelling     Flurbiprofen Other (See Comments)     Can not take due to  kidney transplant  Can not take due to kidney transplant       Nsaids      PN: LW Reaction: KIDNEY PROBLEM     Data   Most Recent 3 CBC's:  Recent Labs   Lab Test 06/27/22 2107 03/02/22  0716 01/28/22  0701   WBC 4.3 2.8* 3.6*   HGB 11.1* 10.4* 10.1*   MCV 98 103* 103*    221 216      Most Recent 3 BMP's:  Recent Labs   Lab Test 06/29/22  0440 06/29/22  0029 06/28/22 2023 06/28/22  1240 06/28/22  0946 06/28/22  0748 06/27/22 2107 03/02/22  0716   NA  --   --   --   --  135  --  132* 134   POTASSIUM  --   --   --   --  4.6  4.6  --  4.6 4.3   CHLORIDE  --   --   --   --  107  --  103 111*   CO2  --   --   --   --  23  --  26 19*   BUN  --   --   --   --  20  --  21 27   CR  --   --   --   --  1.42*  --  1.49* 1.71*   ANIONGAP  --   --   --   --  5  --  3 4   NATASHA  --   --   --   --  9.0  --  9.3 8.9   GLC 82 71 84   < > 97   < > 107* 95    < > = values in this interval not displayed.     Most Recent 2 LFT's:  Recent Labs   Lab Test 06/27/22  2107 04/22/15  0741 11/26/14  1439   AST 32  --  20   ALT 22 26 25   ALKPHOS 95  --  80   BILITOTAL 0.4  --  0.3     Most Recent INR's and Anticoagulation Dosing History:  Anticoagulation Dose History     Recent Dosing and Labs Latest Ref Rng & Units 8/31/2011 9/20/2011 9/21/2011 10/20/2011 3/16/2012 5/7/2013 12/8/2014    INR 0.86 - 1.14 0.99 0.96 0.99 1.11 1.18(H) 1.04 1.1        Most Recent 3 Troponin's:  Recent Labs   Lab Test 04/25/14  1439   TROPI <0.012     Most Recent Cholesterol Panel:  Recent Labs   Lab Test 06/15/16  0708   CHOL 202*   LDL 93   HDL 29*   TRIG 398*     Most Recent 6 Bacteria Isolates From Any Culture (See EPIC Reports for Culture Details):  Recent Labs   Lab Test 05/19/20  0938 12/08/14  1030 12/05/14  1059 11/26/14  1438 04/16/14  0750   CULT No growth Heavy growth Candida glabrata Susceptibility testing not routinely done*  Incorrectly ordered by PCU/Clinic  Canceled, Test credited  Ordered abscess culture instead.   Specimen not received  Canceled, Test credited  Specimen not received Canceled, Test credited >100,000 colonies/mL Escherichia coli* >100,000 colonies/mL Escherichia coli*     Most Recent TSH, T4 and A1c Labs:  Recent Labs   Lab Test 06/27/22 2107   A1C 6.0*     Results for orders placed or performed during the hospital encounter of 06/27/22   XR Chest 2 Views    Narrative    EXAM: XR CHEST 2 VW  LOCATION: [facility]  DATE/TIME: 6/27/2022 10:45 PM    INDICATION: Shortness of breath.  COMPARISON: Chest x-ray single view 3/16/2012.      Impression    IMPRESSION: Resolved plate-like atelectasis in the right lower lung seen previously. Both lungs are clear today. No adenopathy. Normal cardiac size and pulmonary vascularity. Mildly atherosclerotic thoracic aorta. Mild degenerative changes both shoulders   and the spine. Cholecystectomy clips.   Abd/pelvis CT no contrast - Stone Protocol    Narrative    EXAM: CT ABDOMEN PELVIS W/O CONTRAST  LOCATION: [facility]  DATE/TIME: 6/27/2022 10:42 PM    INDICATION: LLQ pain. History of renal transplant, refusing contrast.  COMPARISON: CT chest, abdomen and pelvis without IV contrast 1/28/2022.  TECHNIQUE: CT scan of the abdomen and pelvis was performed without IV contrast. Multiplanar reformats were obtained. Dose reduction techniques were used.  CONTRAST: None.    FINDINGS:   LOWER CHEST: Clear. No pleural effusion on either side. Normal cardiac size. No pericardial effusion.    HEPATOBILIARY: Cholecystectomy. No discrete hepatic lesion.    PANCREAS: Normal.    SPLEEN: Normal.    ADRENAL GLANDS: Normal.    KIDNEYS/BLADDER: Native kidneys have been resected. Right lower quadrant renal transplant without stones, obstruction or adjacent free fluid.    BOWEL: Interval development of irregular thickening of a loop of mid and distal ileum (images 113-148, series 4, images 119-36, series 5, images 38-60, series 6), several loops of upstream small bowel  extending over a length of approximately 15 cm   (images 20-31, series 5), worrisome for primary small bowel lymphoma. Upstream ectasia of a few small bowel loops representing partial mechanical obstruction. No free gas or free fluid. Formed stool material within normal caliber colon.    LYMPH NODES: Numerous prominent mesenteric nodes, a representative node in the left abdomen measures 2.7 x 1.5 cm (image 85, series 4), unchanged. Another node in the mesentery measures 1.9 x 1.1 cm, also stable.    VASCULATURE: Atherosclerotic normal caliber distal abdominal aorta measuring 1.8 x 1.8 cm (image 104, series 4). Normal caliber IVC.    PELVIC ORGANS: Right lower quadrant renal transplant without stones, obstruction or adjacent free fluid. Irregular thickening of a short segment of the mid/distal ileum. No pelvic sidewall or inguinal adenopathy. Vascular calcifications. No free fluid.   Hysterectomy. Phleboliths.    MUSCULOSKELETAL: Mild degenerative changes involving the spine and pelvis. Slight right convex lumbar curve.      Impression    IMPRESSION:   1.  Right lower quadrant renal transplant without stones, obstruction or adjacent free fluid. Native kidneys have been resected.    2.  Interval development of irregular thickening of a loop of mid/distal ileum, worrisome for primary small bowel lymphoma. Upstream ectasia of a few small bowel loops representing partial mechanical obstruction, free gas or free fluid. Numerous   mesenteric nodes, stable.    3.  Cholecystectomy. Hysterectomy.    4.  Findings discussed with the referring physician, Dr. Ceasar Licea, following the scan at 2335 hours.    NOTE: ABNORMAL REPORT    THE DICTATION ABOVE DESCRIBES AN ABNORMALITY FOR WHICH FOLLOW-UP IS NEEDED.     US Lower Extremity Venous Duplex Bilateral    Narrative    EXAM: US LOWER EXTREMITY VENOUS DUPLEX BILATERAL  LOCATION: [facility]  DATE/TIME: 6/27/2022 10:23 PM    INDICATION: Pain and  swelling.  COMPARISON: None.  TECHNIQUE: Venous Duplex ultrasound of bilateral lower extremities with and without compression, augmentation and duplex. Color flow and spectral Doppler with waveform analysis performed.    FINDINGS: Exam includes the common femoral, femoral, popliteal veins as well as segmentally visualized deep calf veins and greater saphenous vein.     RIGHT: No deep vein thrombosis. No superficial thrombophlebitis. No popliteal cyst.    LEFT: No deep vein thrombosis. No superficial thrombophlebitis. No popliteal cyst.      Impression    IMPRESSION:  1.  No deep venous thrombosis in the bilateral lower extremities.   NM Lung Scan Perfusion Particulate    Narrative    EXAM: NM LUNG SCAN PERFUSION PARTICULATE  LOCATION: [facility]  DATE/TIME: 6/28/2022 7:37 AM    INDICATION: dyspnea, chest pain, elevated D dimer (has bowel obstruction, possible lymphoma, prior renal transplant)  COMPARISON: CXR 06/27/2022 reviewed.  TECHNIQUE: 3.6 mCi technetium-99m MAA, IV. Standard lung perfusion imaging.    FINDINGS: Normal perfusion to both lungs. No segmental perfusion defects.      Impression    IMPRESSION:   Negative for pulmonary embolism.

## 2022-08-29 ENCOUNTER — LAB (OUTPATIENT)
Dept: LAB | Facility: CLINIC | Age: 57
End: 2022-08-29
Payer: COMMERCIAL

## 2022-08-29 DIAGNOSIS — D47.Z1 PTLD (POST-TRANSPLANT LYMPHOPROLIFERATIVE DISORDER) (H): Primary | ICD-10-CM

## 2022-08-29 LAB
BASOPHILS # BLD MANUAL: 0 10E3/UL (ref 0–0.2)
BASOPHILS NFR BLD MANUAL: 0 %
DACRYOCYTES BLD QL SMEAR: SLIGHT
EOSINOPHIL # BLD MANUAL: 0 10E3/UL (ref 0–0.7)
EOSINOPHIL NFR BLD MANUAL: 0 %
ERYTHROCYTE [DISTWIDTH] IN BLOOD BY AUTOMATED COUNT: 14.3 % (ref 10–15)
HCT VFR BLD AUTO: 27.4 % (ref 35–47)
HGB BLD-MCNC: 8.8 G/DL (ref 11.7–15.7)
HOLD SPECIMEN: NORMAL
LYMPHOCYTES # BLD MANUAL: 0.3 10E3/UL (ref 0.8–5.3)
LYMPHOCYTES NFR BLD MANUAL: 8 %
MCH RBC QN AUTO: 28.5 PG (ref 26.5–33)
MCHC RBC AUTO-ENTMCNC: 32.1 G/DL (ref 31.5–36.5)
MCV RBC AUTO: 89 FL (ref 78–100)
MONOCYTES # BLD MANUAL: 0 10E3/UL (ref 0–1.3)
MONOCYTES NFR BLD MANUAL: 0 %
NEUTROPHILS # BLD MANUAL: 3.3 10E3/UL (ref 1.6–8.3)
NEUTROPHILS NFR BLD MANUAL: 92 %
PLAT MORPH BLD: ABNORMAL
PLATELET # BLD AUTO: 220 10E3/UL (ref 150–450)
RBC # BLD AUTO: 3.09 10E6/UL (ref 3.8–5.2)
RBC MORPH BLD: ABNORMAL
WBC # BLD AUTO: 3.6 10E3/UL (ref 4–11)

## 2022-08-29 PROCEDURE — 84550 ASSAY OF BLOOD/URIC ACID: CPT

## 2022-08-29 PROCEDURE — 84100 ASSAY OF PHOSPHORUS: CPT

## 2022-08-29 PROCEDURE — 36415 COLL VENOUS BLD VENIPUNCTURE: CPT

## 2022-08-29 PROCEDURE — 85027 COMPLETE CBC AUTOMATED: CPT

## 2022-08-29 PROCEDURE — 85007 BL SMEAR W/DIFF WBC COUNT: CPT

## 2022-08-29 PROCEDURE — 80048 BASIC METABOLIC PNL TOTAL CA: CPT

## 2022-08-30 LAB
ANION GAP SERPL CALCULATED.3IONS-SCNC: 7 MMOL/L (ref 3–14)
BUN SERPL-MCNC: 34 MG/DL (ref 7–30)
CALCIUM SERPL-MCNC: 8.6 MG/DL (ref 8.5–10.1)
CHLORIDE BLD-SCNC: 103 MMOL/L (ref 94–109)
CO2 SERPL-SCNC: 23 MMOL/L (ref 20–32)
CREAT SERPL-MCNC: 1.05 MG/DL (ref 0.52–1.04)
GFR SERPL CREATININE-BSD FRML MDRD: 62 ML/MIN/1.73M2
GLUCOSE BLD-MCNC: 249 MG/DL (ref 70–99)
PHOSPHATE SERPL-MCNC: 2.4 MG/DL (ref 2.5–4.5)
POTASSIUM BLD-SCNC: 4.4 MMOL/L (ref 3.4–5.3)
SODIUM SERPL-SCNC: 133 MMOL/L (ref 133–144)
URATE SERPL-MCNC: 3.8 MG/DL (ref 2.6–6)

## 2022-09-01 ENCOUNTER — LAB (OUTPATIENT)
Dept: LAB | Facility: CLINIC | Age: 57
End: 2022-09-01
Payer: COMMERCIAL

## 2022-09-01 DIAGNOSIS — D47.Z1 PTLD (POST-TRANSPLANT LYMPHOPROLIFERATIVE DISORDER) (H): ICD-10-CM

## 2022-09-01 LAB
BASOPHILS # BLD MANUAL: 0 10E3/UL (ref 0–0.2)
BASOPHILS NFR BLD MANUAL: 0 %
EOSINOPHIL # BLD MANUAL: 0 10E3/UL (ref 0–0.7)
EOSINOPHIL NFR BLD MANUAL: 1 %
ERYTHROCYTE [DISTWIDTH] IN BLOOD BY AUTOMATED COUNT: 14.1 % (ref 10–15)
HCT VFR BLD AUTO: 28.7 % (ref 35–47)
HGB BLD-MCNC: 9.5 G/DL (ref 11.7–15.7)
HOLD SPECIMEN: NORMAL
LYMPHOCYTES # BLD MANUAL: 0.2 10E3/UL (ref 0.8–5.3)
LYMPHOCYTES NFR BLD MANUAL: 47 %
MCH RBC QN AUTO: 28 PG (ref 26.5–33)
MCHC RBC AUTO-ENTMCNC: 33.1 G/DL (ref 31.5–36.5)
MCV RBC AUTO: 85 FL (ref 78–100)
MONOCYTES # BLD MANUAL: 0.1 10E3/UL (ref 0–1.3)
MONOCYTES NFR BLD MANUAL: 33 %
NEUTROPHILS # BLD MANUAL: 0.1 10E3/UL (ref 1.6–8.3)
NEUTROPHILS NFR BLD MANUAL: 19 %
PLAT MORPH BLD: ABNORMAL
PLATELET # BLD AUTO: 227 10E3/UL (ref 150–450)
RBC # BLD AUTO: 3.39 10E6/UL (ref 3.8–5.2)
RBC MORPH BLD: ABNORMAL
WBC # BLD AUTO: 0.4 10E3/UL (ref 4–11)

## 2022-09-01 PROCEDURE — 85027 COMPLETE CBC AUTOMATED: CPT

## 2022-09-01 PROCEDURE — 84550 ASSAY OF BLOOD/URIC ACID: CPT

## 2022-09-01 PROCEDURE — 85007 BL SMEAR W/DIFF WBC COUNT: CPT

## 2022-09-01 PROCEDURE — 84100 ASSAY OF PHOSPHORUS: CPT

## 2022-09-01 PROCEDURE — 36415 COLL VENOUS BLD VENIPUNCTURE: CPT

## 2022-09-01 PROCEDURE — 80048 BASIC METABOLIC PNL TOTAL CA: CPT

## 2022-09-02 ENCOUNTER — TELEPHONE (OUTPATIENT)
Dept: TRANSPLANT | Facility: CLINIC | Age: 57
End: 2022-09-02

## 2022-09-02 ENCOUNTER — HOSPITAL ENCOUNTER (EMERGENCY)
Facility: CLINIC | Age: 57
End: 2022-09-02
Payer: COMMERCIAL

## 2022-09-02 ENCOUNTER — HOSPITAL ENCOUNTER (OUTPATIENT)
Facility: CLINIC | Age: 57
Setting detail: OBSERVATION
Discharge: HOME | End: 2022-09-03
Attending: INTERNAL MEDICINE | Admitting: INTERNAL MEDICINE
Payer: COMMERCIAL

## 2022-09-02 DIAGNOSIS — Z11.52 ENCOUNTER FOR SCREENING LABORATORY TESTING FOR SEVERE ACUTE RESPIRATORY SYNDROME CORONAVIRUS 2 (SARS-COV-2): ICD-10-CM

## 2022-09-02 DIAGNOSIS — D47.Z1 POST-TRANSPLANT LYMPHOPROLIFERATIVE DISORDER (H): ICD-10-CM

## 2022-09-02 DIAGNOSIS — N17.9 ACUTE KIDNEY INJURY (H): ICD-10-CM

## 2022-09-02 DIAGNOSIS — E87.6 HYPOKALEMIA: Primary | ICD-10-CM

## 2022-09-02 DIAGNOSIS — E87.20 METABOLIC ACIDOSIS: ICD-10-CM

## 2022-09-02 DIAGNOSIS — Z48.298 AFTERCARE FOLLOWING ORGAN TRANSPLANT: ICD-10-CM

## 2022-09-02 DIAGNOSIS — R19.7 DIARRHEA: ICD-10-CM

## 2022-09-02 DIAGNOSIS — Z94.0 KIDNEY TRANSPLANTED: Primary | ICD-10-CM

## 2022-09-02 DIAGNOSIS — Z92.21 S/P CHEMOTHERAPY, TIME SINCE LESS THAN 4 WEEKS: ICD-10-CM

## 2022-09-02 DIAGNOSIS — Z94.0 S/P KIDNEY TRANSPLANT: ICD-10-CM

## 2022-09-02 DIAGNOSIS — E86.0 DEHYDRATION: ICD-10-CM

## 2022-09-02 DIAGNOSIS — R19.7 DIARRHEA, UNSPECIFIED TYPE: ICD-10-CM

## 2022-09-02 LAB
ALBUMIN SERPL BCG-MCNC: 3.4 G/DL (ref 3.5–5.2)
ALBUMIN UR-MCNC: 20 MG/DL
ALP SERPL-CCNC: 59 U/L (ref 35–104)
ALT SERPL W P-5'-P-CCNC: 15 U/L (ref 10–35)
ANION GAP SERPL CALCULATED.3IONS-SCNC: 11 MMOL/L (ref 3–14)
ANION GAP SERPL CALCULATED.3IONS-SCNC: 11 MMOL/L (ref 7–15)
APPEARANCE UR: CLEAR
AST SERPL W P-5'-P-CCNC: 13 U/L (ref 10–35)
BACTERIA #/AREA URNS HPF: ABNORMAL /HPF
BASOPHILS # BLD MANUAL: 0 10E3/UL (ref 0–0.2)
BASOPHILS NFR BLD MANUAL: 0 %
BILIRUB SERPL-MCNC: 0.2 MG/DL
BILIRUB UR QL STRIP: NEGATIVE
BUN SERPL-MCNC: 28 MG/DL (ref 7–30)
BUN SERPL-MCNC: 37.6 MG/DL (ref 6–20)
CALCIUM SERPL-MCNC: 8.2 MG/DL (ref 8.6–10)
CALCIUM SERPL-MCNC: 8.8 MG/DL (ref 8.5–10.1)
CHLORIDE BLD-SCNC: 99 MMOL/L (ref 94–109)
CHLORIDE SERPL-SCNC: 104 MMOL/L (ref 98–107)
CO2 SERPL-SCNC: 17 MMOL/L (ref 20–32)
COLOR UR AUTO: ABNORMAL
CREAT SERPL-MCNC: 1.81 MG/DL (ref 0.52–1.04)
CREAT SERPL-MCNC: 2.01 MG/DL (ref 0.51–0.95)
DEPRECATED HCO3 PLAS-SCNC: 19 MMOL/L (ref 22–29)
EOSINOPHIL # BLD MANUAL: 0 10E3/UL (ref 0–0.7)
EOSINOPHIL NFR BLD MANUAL: 0 %
ERYTHROCYTE [DISTWIDTH] IN BLOOD BY AUTOMATED COUNT: 14.1 % (ref 10–15)
GFR SERPL CREATININE-BSD FRML MDRD: 28 ML/MIN/1.73M2
GFR SERPL CREATININE-BSD FRML MDRD: 32 ML/MIN/1.73M2
GLUCOSE BLD-MCNC: 248 MG/DL (ref 70–99)
GLUCOSE SERPL-MCNC: 174 MG/DL (ref 70–99)
GLUCOSE UR STRIP-MCNC: NEGATIVE MG/DL
HCO3 BLDV-SCNC: 17 MMOL/L (ref 21–28)
HCT VFR BLD AUTO: 22.7 % (ref 35–47)
HGB BLD-MCNC: 7.7 G/DL (ref 11.7–15.7)
HGB UR QL STRIP: NEGATIVE
KETONES UR STRIP-MCNC: NEGATIVE MG/DL
LACTATE BLD-SCNC: 1.1 MMOL/L
LEUKOCYTE ESTERASE UR QL STRIP: NEGATIVE
LIPASE SERPL-CCNC: 34 U/L (ref 13–60)
LYMPHOCYTES # BLD MANUAL: 0.1 10E3/UL (ref 0.8–5.3)
LYMPHOCYTES NFR BLD MANUAL: 4 %
MAGNESIUM SERPL-MCNC: 2 MG/DL (ref 1.7–2.3)
MCH RBC QN AUTO: 28.4 PG (ref 26.5–33)
MCHC RBC AUTO-ENTMCNC: 33.9 G/DL (ref 31.5–36.5)
MCV RBC AUTO: 84 FL (ref 78–100)
MONOCYTES # BLD MANUAL: 0.2 10E3/UL (ref 0–1.3)
MONOCYTES NFR BLD MANUAL: 12 %
MYELOCYTES # BLD MANUAL: 0 10E3/UL
MYELOCYTES NFR BLD MANUAL: 2 %
NEUTROPHILS # BLD MANUAL: 1.3 10E3/UL (ref 1.6–8.3)
NEUTROPHILS NFR BLD MANUAL: 82 %
NITRATE UR QL: NEGATIVE
NRBC # BLD AUTO: 0 10E3/UL
NRBC BLD MANUAL-RTO: 2 %
PCO2 BLDV: 32 MM HG (ref 40–50)
PH BLDV: 7.33 [PH] (ref 7.32–7.43)
PH UR STRIP: 6 [PH] (ref 5–7)
PHOSPHATE SERPL-MCNC: 2.3 MG/DL (ref 2.5–4.5)
PLAT MORPH BLD: ABNORMAL
PLATELET # BLD AUTO: 150 10E3/UL (ref 150–450)
PO2 BLDV: 17 MM HG (ref 25–47)
POTASSIUM BLD-SCNC: 3.2 MMOL/L (ref 3.4–5.3)
POTASSIUM SERPL-SCNC: 2.7 MMOL/L (ref 3.4–5.3)
PROT SERPL-MCNC: 6.9 G/DL (ref 6.4–8.3)
RBC # BLD AUTO: 2.71 10E6/UL (ref 3.8–5.2)
RBC MORPH BLD: ABNORMAL
RBC URINE: <1 /HPF
SAO2 % BLDV: 22 % (ref 94–100)
SARS-COV-2 RNA RESP QL NAA+PROBE: NEGATIVE
SODIUM SERPL-SCNC: 127 MMOL/L (ref 133–144)
SODIUM SERPL-SCNC: 134 MMOL/L (ref 136–145)
SP GR UR STRIP: 1.01 (ref 1–1.03)
SQUAMOUS EPITHELIAL: 1 /HPF
TRANSITIONAL EPI: <1 /HPF
TROPONIN T SERPL HS-MCNC: 16 NG/L
URATE SERPL-MCNC: 4.6 MG/DL (ref 2.6–6)
UROBILINOGEN UR STRIP-MCNC: NORMAL MG/DL
WBC # BLD AUTO: 1.6 10E3/UL (ref 4–11)
WBC URINE: 6 /HPF

## 2022-09-02 PROCEDURE — 99285 EMERGENCY DEPT VISIT HI MDM: CPT | Mod: CS | Performed by: INTERNAL MEDICINE

## 2022-09-02 PROCEDURE — U0005 INFEC AGEN DETEC AMPLI PROBE: HCPCS | Performed by: INTERNAL MEDICINE

## 2022-09-02 PROCEDURE — 87040 BLOOD CULTURE FOR BACTERIA: CPT | Performed by: INTERNAL MEDICINE

## 2022-09-02 PROCEDURE — G0378 HOSPITAL OBSERVATION PER HR: HCPCS

## 2022-09-02 PROCEDURE — 258N000003 HC RX IP 258 OP 636: Performed by: INTERNAL MEDICINE

## 2022-09-02 PROCEDURE — 250N000013 HC RX MED GY IP 250 OP 250 PS 637: Performed by: INTERNAL MEDICINE

## 2022-09-02 PROCEDURE — 85007 BL SMEAR W/DIFF WBC COUNT: CPT | Performed by: INTERNAL MEDICINE

## 2022-09-02 PROCEDURE — 36415 COLL VENOUS BLD VENIPUNCTURE: CPT | Performed by: INTERNAL MEDICINE

## 2022-09-02 PROCEDURE — 84484 ASSAY OF TROPONIN QUANT: CPT | Performed by: INTERNAL MEDICINE

## 2022-09-02 PROCEDURE — 82803 BLOOD GASES ANY COMBINATION: CPT

## 2022-09-02 PROCEDURE — C9803 HOPD COVID-19 SPEC COLLECT: HCPCS

## 2022-09-02 PROCEDURE — 81001 URINALYSIS AUTO W/SCOPE: CPT | Performed by: INTERNAL MEDICINE

## 2022-09-02 PROCEDURE — 96360 HYDRATION IV INFUSION INIT: CPT

## 2022-09-02 PROCEDURE — 83690 ASSAY OF LIPASE: CPT | Performed by: INTERNAL MEDICINE

## 2022-09-02 PROCEDURE — 99220 PR INITIAL OBSERVATION CARE,LEVEL III: CPT | Performed by: STUDENT IN AN ORGANIZED HEALTH CARE EDUCATION/TRAINING PROGRAM

## 2022-09-02 PROCEDURE — 99285 EMERGENCY DEPT VISIT HI MDM: CPT | Mod: 25

## 2022-09-02 PROCEDURE — 84100 ASSAY OF PHOSPHORUS: CPT | Performed by: PHYSICIAN ASSISTANT

## 2022-09-02 PROCEDURE — 80053 COMPREHEN METABOLIC PANEL: CPT | Performed by: INTERNAL MEDICINE

## 2022-09-02 PROCEDURE — 83735 ASSAY OF MAGNESIUM: CPT | Performed by: INTERNAL MEDICINE

## 2022-09-02 PROCEDURE — 85014 HEMATOCRIT: CPT | Performed by: INTERNAL MEDICINE

## 2022-09-02 RX ORDER — SODIUM CHLORIDE 9 MG/ML
INJECTION, SOLUTION INTRAVENOUS CONTINUOUS
Status: DISCONTINUED | OUTPATIENT
Start: 2022-09-02 | End: 2022-09-03

## 2022-09-02 RX ORDER — AZATHIOPRINE 50 MG/1
100 TABLET ORAL EVERY MORNING
Status: DISCONTINUED | OUTPATIENT
Start: 2022-09-03 | End: 2022-09-03 | Stop reason: HOSPADM

## 2022-09-02 RX ORDER — TACROLIMUS 0.5 MG/1
0.5 CAPSULE ORAL EVERY EVENING
Status: DISCONTINUED | OUTPATIENT
Start: 2022-09-02 | End: 2022-09-03 | Stop reason: HOSPADM

## 2022-09-02 RX ORDER — SODIUM BICARBONATE 650 MG/1
650 TABLET ORAL 2 TIMES DAILY
Status: DISCONTINUED | OUTPATIENT
Start: 2022-09-03 | End: 2022-09-03

## 2022-09-02 RX ORDER — TACROLIMUS 1 MG/1
1 CAPSULE ORAL EVERY MORNING
Status: DISCONTINUED | OUTPATIENT
Start: 2022-09-03 | End: 2022-09-03 | Stop reason: HOSPADM

## 2022-09-02 RX ORDER — LIDOCAINE 40 MG/G
CREAM TOPICAL
Status: DISCONTINUED | OUTPATIENT
Start: 2022-09-02 | End: 2022-09-03 | Stop reason: HOSPADM

## 2022-09-02 RX ORDER — PREDNISONE 5 MG/1
10 TABLET ORAL DAILY
Status: DISCONTINUED | OUTPATIENT
Start: 2022-09-03 | End: 2022-09-03 | Stop reason: HOSPADM

## 2022-09-02 RX ORDER — SULFAMETHOXAZOLE AND TRIMETHOPRIM 400; 80 MG/1; MG/1
1 TABLET ORAL EVERY OTHER DAY
Status: DISCONTINUED | OUTPATIENT
Start: 2022-09-03 | End: 2022-09-03

## 2022-09-02 RX ORDER — POTASSIUM CHLORIDE 1.5 G/1.58G
40 POWDER, FOR SOLUTION ORAL ONCE
Status: COMPLETED | OUTPATIENT
Start: 2022-09-02 | End: 2022-09-02

## 2022-09-02 RX ADMIN — SODIUM CHLORIDE: 9 INJECTION, SOLUTION INTRAVENOUS at 22:59

## 2022-09-02 RX ADMIN — SODIUM CHLORIDE 1000 ML: 9 INJECTION, SOLUTION INTRAVENOUS at 19:45

## 2022-09-02 RX ADMIN — POTASSIUM CHLORIDE 40 MEQ: 1.5 POWDER, FOR SOLUTION ORAL at 21:10

## 2022-09-02 ASSESSMENT — ACTIVITIES OF DAILY LIVING (ADL)
ADLS_ACUITY_SCORE: 35

## 2022-09-02 ASSESSMENT — ENCOUNTER SYMPTOMS
ANAL BLEEDING: 0
DIFFICULTY URINATING: 0
COUGH: 0
NAUSEA: 1
SHORTNESS OF BREATH: 0
DYSURIA: 0
BACK PAIN: 0
DIARRHEA: 1
APPETITE CHANGE: 1
CHILLS: 0
CONFUSION: 0
TROUBLE SWALLOWING: 0
ADENOPATHY: 0
FEVER: 0
LIGHT-HEADEDNESS: 1

## 2022-09-02 NOTE — TELEPHONE ENCOUNTER
Creatinine = 1.81  (9/1/22)  Baseline 1.2-1.4      PLAN:  Check for recent illness.  Any fever?  Any missed medication?  Changes in medication, (angeline diuretics)?  Any pain over the transplanted kidney?  Any nausea / vomiting / diarrhea?  Dehydrated?   If not on fluid restriction, instruct to improve hydration and recheck BMP 1-2 weeks.    OUTCOME:  No answer. Left VM.  BMP order placed.

## 2022-09-03 VITALS
RESPIRATION RATE: 16 BRPM | DIASTOLIC BLOOD PRESSURE: 57 MMHG | BODY MASS INDEX: 26.99 KG/M2 | WEIGHT: 162 LBS | HEIGHT: 65 IN | TEMPERATURE: 98.5 F | OXYGEN SATURATION: 100 % | HEART RATE: 71 BPM | SYSTOLIC BLOOD PRESSURE: 94 MMHG

## 2022-09-03 LAB
ALBUMIN SERPL BCG-MCNC: 3.1 G/DL (ref 3.5–5.2)
ALP SERPL-CCNC: 61 U/L (ref 35–104)
ALT SERPL W P-5'-P-CCNC: 12 U/L (ref 10–35)
ANION GAP SERPL CALCULATED.3IONS-SCNC: 10 MMOL/L (ref 7–15)
AST SERPL W P-5'-P-CCNC: 22 U/L (ref 10–35)
BASOPHILS # BLD MANUAL: 0 10E3/UL (ref 0–0.2)
BASOPHILS NFR BLD MANUAL: 0 %
BILIRUB SERPL-MCNC: 0.2 MG/DL
BUN SERPL-MCNC: 24.1 MG/DL (ref 6–20)
CALCIUM SERPL-MCNC: 8.3 MG/DL (ref 8.6–10)
CHLORIDE SERPL-SCNC: 110 MMOL/L (ref 98–107)
CREAT SERPL-MCNC: 1.37 MG/DL (ref 0.51–0.95)
DEPRECATED HCO3 PLAS-SCNC: 16 MMOL/L (ref 22–29)
EOSINOPHIL # BLD MANUAL: 0 10E3/UL (ref 0–0.7)
EOSINOPHIL NFR BLD MANUAL: 0 %
ERYTHROCYTE [DISTWIDTH] IN BLOOD BY AUTOMATED COUNT: 14.3 % (ref 10–15)
GFR SERPL CREATININE-BSD FRML MDRD: 45 ML/MIN/1.73M2
GLUCOSE BLDC GLUCOMTR-MCNC: 134 MG/DL (ref 70–99)
GLUCOSE SERPL-MCNC: 103 MG/DL (ref 70–99)
HCT VFR BLD AUTO: 23.4 % (ref 35–47)
HGB BLD-MCNC: 7.4 G/DL (ref 11.7–15.7)
LYMPHOCYTES # BLD MANUAL: 0.4 10E3/UL (ref 0.8–5.3)
LYMPHOCYTES NFR BLD MANUAL: 16 %
MAGNESIUM SERPL-MCNC: 1.9 MG/DL (ref 1.7–2.3)
MCH RBC QN AUTO: 27.9 PG (ref 26.5–33)
MCHC RBC AUTO-ENTMCNC: 31.6 G/DL (ref 31.5–36.5)
MCV RBC AUTO: 88 FL (ref 78–100)
MONOCYTES # BLD MANUAL: 0.4 10E3/UL (ref 0–1.3)
MONOCYTES NFR BLD MANUAL: 19 %
NEUTROPHILS # BLD MANUAL: 1.5 10E3/UL (ref 1.6–8.3)
NEUTROPHILS NFR BLD MANUAL: 65 %
PHOSPHATE SERPL-MCNC: 2.7 MG/DL (ref 2.5–4.5)
PLAT MORPH BLD: ABNORMAL
PLATELET # BLD AUTO: 168 10E3/UL (ref 150–450)
POTASSIUM SERPL-SCNC: 3.5 MMOL/L (ref 3.4–5.3)
PROT SERPL-MCNC: 6.6 G/DL (ref 6.4–8.3)
RBC # BLD AUTO: 2.65 10E6/UL (ref 3.8–5.2)
RBC MORPH BLD: ABNORMAL
SODIUM SERPL-SCNC: 136 MMOL/L (ref 136–145)
TROPONIN T SERPL HS-MCNC: 13 NG/L
WBC # BLD AUTO: 2.3 10E3/UL (ref 4–11)

## 2022-09-03 PROCEDURE — 99217 PR OBSERVATION CARE DISCHARGE: CPT | Performed by: STUDENT IN AN ORGANIZED HEALTH CARE EDUCATION/TRAINING PROGRAM

## 2022-09-03 PROCEDURE — 258N000003 HC RX IP 258 OP 636: Performed by: STUDENT IN AN ORGANIZED HEALTH CARE EDUCATION/TRAINING PROGRAM

## 2022-09-03 PROCEDURE — 83735 ASSAY OF MAGNESIUM: CPT | Performed by: PHYSICIAN ASSISTANT

## 2022-09-03 PROCEDURE — 250N000013 HC RX MED GY IP 250 OP 250 PS 637: Performed by: STUDENT IN AN ORGANIZED HEALTH CARE EDUCATION/TRAINING PROGRAM

## 2022-09-03 PROCEDURE — 250N000012 HC RX MED GY IP 250 OP 636 PS 637: Performed by: STUDENT IN AN ORGANIZED HEALTH CARE EDUCATION/TRAINING PROGRAM

## 2022-09-03 PROCEDURE — 80053 COMPREHEN METABOLIC PANEL: CPT | Performed by: STUDENT IN AN ORGANIZED HEALTH CARE EDUCATION/TRAINING PROGRAM

## 2022-09-03 PROCEDURE — 99223 1ST HOSP IP/OBS HIGH 75: CPT | Performed by: NURSE PRACTITIONER

## 2022-09-03 PROCEDURE — 99207 PR APP CREDIT; MD BILLING SHARED VISIT: CPT | Performed by: PHYSICIAN ASSISTANT

## 2022-09-03 PROCEDURE — 85007 BL SMEAR W/DIFF WBC COUNT: CPT | Performed by: STUDENT IN AN ORGANIZED HEALTH CARE EDUCATION/TRAINING PROGRAM

## 2022-09-03 PROCEDURE — 96361 HYDRATE IV INFUSION ADD-ON: CPT

## 2022-09-03 PROCEDURE — 82962 GLUCOSE BLOOD TEST: CPT

## 2022-09-03 PROCEDURE — 84484 ASSAY OF TROPONIN QUANT: CPT | Performed by: STUDENT IN AN ORGANIZED HEALTH CARE EDUCATION/TRAINING PROGRAM

## 2022-09-03 PROCEDURE — G0378 HOSPITAL OBSERVATION PER HR: HCPCS

## 2022-09-03 PROCEDURE — 85014 HEMATOCRIT: CPT | Performed by: STUDENT IN AN ORGANIZED HEALTH CARE EDUCATION/TRAINING PROGRAM

## 2022-09-03 PROCEDURE — 36415 COLL VENOUS BLD VENIPUNCTURE: CPT | Performed by: STUDENT IN AN ORGANIZED HEALTH CARE EDUCATION/TRAINING PROGRAM

## 2022-09-03 RX ORDER — POTASSIUM CHLORIDE 1.5 G/1.58G
40 POWDER, FOR SOLUTION ORAL DAILY
Qty: 6 PACKET | Refills: 0 | Status: SHIPPED | OUTPATIENT
Start: 2022-09-03 | End: 2022-09-06

## 2022-09-03 RX ORDER — POTASSIUM CHLORIDE 750 MG/1
20 TABLET, EXTENDED RELEASE ORAL ONCE
Status: COMPLETED | OUTPATIENT
Start: 2022-09-03 | End: 2022-09-03

## 2022-09-03 RX ORDER — PREDNISONE 10 MG/1
10 TABLET ORAL DAILY
COMMUNITY
Start: 2022-07-05

## 2022-09-03 RX ORDER — SODIUM BICARBONATE 650 MG/1
1300 TABLET ORAL 2 TIMES DAILY
Status: DISCONTINUED | OUTPATIENT
Start: 2022-09-03 | End: 2022-09-03 | Stop reason: HOSPADM

## 2022-09-03 RX ORDER — SODIUM BICARBONATE 650 MG/1
1300 TABLET ORAL 2 TIMES DAILY
Qty: 180 TABLET | Refills: 3 | Status: SHIPPED | OUTPATIENT
Start: 2022-09-03 | End: 2022-09-06

## 2022-09-03 RX ORDER — POTASSIUM CHLORIDE 750 MG/1
40 TABLET, EXTENDED RELEASE ORAL ONCE
Status: COMPLETED | OUTPATIENT
Start: 2022-09-03 | End: 2022-09-03

## 2022-09-03 RX ORDER — SULFAMETHOXAZOLE AND TRIMETHOPRIM 400; 80 MG/1; MG/1
1 TABLET ORAL DAILY
Status: DISCONTINUED | OUTPATIENT
Start: 2022-09-04 | End: 2022-09-03 | Stop reason: HOSPADM

## 2022-09-03 RX ADMIN — POTASSIUM CHLORIDE 20 MEQ: 750 TABLET, EXTENDED RELEASE ORAL at 11:05

## 2022-09-03 RX ADMIN — SODIUM CHLORIDE: 9 INJECTION, SOLUTION INTRAVENOUS at 08:27

## 2022-09-03 RX ADMIN — SODIUM BICARBONATE 650 MG: 650 TABLET ORAL at 11:05

## 2022-09-03 RX ADMIN — POTASSIUM CHLORIDE 40 MEQ: 750 TABLET, EXTENDED RELEASE ORAL at 08:29

## 2022-09-03 RX ADMIN — PREDNISONE 10 MG: 5 TABLET ORAL at 08:28

## 2022-09-03 ASSESSMENT — ACTIVITIES OF DAILY LIVING (ADL)
ADLS_ACUITY_SCORE: 35

## 2022-09-03 NOTE — PROGRESS NOTES
"Pt is irate and wants to leave. She is currently refusing fluids and labs. Writer educated pt on importance of interventions. Pt yelled, \"My labs and blood pressure are fine. I feel fine. I could run a marathon right now\". MD made aware. Will continue to monitor.    BP 94/57 (BP Location: Left arm)   Pulse 71   Temp 98.5  F (36.9  C) (Oral)   Resp 16   Ht 1.638 m (5' 4.5\")   Wt 73.5 kg (162 lb)   SpO2 100%   BMI 27.38 kg/m    "

## 2022-09-03 NOTE — CONSULTS
[facility]  Transplant Nephrology Consult  Date of Admission:  9/2/2022  Today's Date: 09/03/2022  Requesting physician: Marcellus Jones*    Recommendations:  - give additional KCL 40 meq every day & repeat labs tomorrow  -sodium bicarb 1300mg TID    Assessment & Plan   # LDKT: Stable .  GISELLA improved with hydration   - Baseline Creatinine: ~ 1.2-1.4   - Proteinuria: Minimal (0.2-0.5 grams)   - Date DSA Last Checked: Jun/2017      Latest DSA: No              - BK Viremia: No              - Kidney Tx Biopsy: Sep 22, 2001; Result: No rejection. There are no glomerular, vascular or tubulointerstitial abnormalities    # Immunosuppression: Prednisone (dose 10 mg daily)   - Changes: Not at this time    # Infection Prophylaxis:   - PJP: Sulfa/TMP (Bactrim)    # Hypertension: Controlled;  Goal BP: > 100, but < 130 systolic   - Volume status: Euvolemic    - Changes: Not at this time    # Diabetes: Controlled (HbA1c <7%) Last HbA1c: 6.0%   - Management as per primary team.    # Anemia in Chronic Renal Disease: Hgb: Stable      CHANCE: No   - Iron studies: Replete    # Mineral Bone Disorder:   - Secondary renal hyperparathyroidism; PTH level: Not checked recently        On treatment: None  - Vitamin D; level: Not checked recently, but was low last check        On supplement: No  - Calcium; level: Low        On supplement: No  - Phosphorus; level: Normal        On supplement: No    # Electrolytes:   - Potassium; level: Normal        On supplement: Yes  - Magnesium; level: Normal        On supplement: No  - Bicarbonate; level: Low        On supplement: Yes.  Start sodium bicarb 1300mg TID  - Sodium; level: Normal     #Diarreha: Improved.  Started after her first course of CHOP. States she has lost 17 lbs in one week. She denies any vomiting.     #PTLD  Leukopenia, ANC >1, no neutropenia   Acute on chronic anemia  On R-CHOP at [place]. Concerning for this leading to diarrhea given  severity of colonic lesions.     # Aneurysm Risk in PKD: Had MRA in 2006 that was normal    # Transplant History:  Etiology of Kidney Failure: Polycystic kidney disease (PKD)  Tx: LDKT  Transplant: 7/22/2011 (Kidney)  Crossmatch at time of Tx: negative  Significant changes in immunosuppression: None  Significant transplant-related complications: None    Recommendations were communicated to the primary team verbally.    Seen and discussed with Dr. Yanely Torrez, NP  Pager: [phone]    REASON FOR CONSULT   Kidney transplant    History of Present Illness   Awa Alexander is a 56 year old female admitted on 9/2/2022. She has a PMH of polycystic kidney disease s/p renal tx in 2011 c/b by PTLD now on R-CHOP managed by [facility].  She presented to the ED with an GISELLA, and diarrhea.  She states she lost 17 lbs in 1 week, but diarrhea has improved.      She states she was nauseated and that has improved.  She denies any hematuria, dysuria or frequency.      She denies any fevers or chills.      Review of Systems    The 10 point Review of Systems is negative other than noted in the HPI or here.     Past Medical History    I have reviewed this patient's medical history and updated it with pertinent information if needed.   Past Medical History:   Diagnosis Date     Anemia in chronic kidney disease(285.21)      Diabetes mellitus (H) 2015     Dyslipidaemia      ESRD (end stage renal disease) (H)      High risk medication use      History of basal cell cancer     nose and ear     Immunosuppressed status (H)      Kidney replaced by transplant 2011     PKD (polycystic kidney disease) 1994       Past Surgical History   I have reviewed this patient's surgical history and updated it with pertinent information if needed.  Past Surgical History:   Procedure Laterality Date     APPENDECTOMY       CHOLECYSTECTOMY       CYSTOSCOPY, REMOVE STENT(S), COMBINED  1/24/2013    Procedure: COMBINED CYSTOSCOPY, REMOVE STENT(S);   Removal of Double J Stent ;  Surgeon: Manpreet Mares MD;  Location: [facility] OR     GYN SURGERY      supracervical hysterectomy.  pt has ovaries     INSERT STENT URETER  10/29/2012    Procedure: INSERT STENT URETER (PRE-OP);  Replacement of Single  J Stents with Tandem Stents;  Surgeon: Shahab Silva MD;  Location: [facility] OR     NEPHRECTOMY BILATERAL  12/21/2012    Procedure: NEPHRECTOMY BILATERAL;  Bilateral Native Nephrectomy with Ureteropylostomy, Appendectomy, Cholecystectomy, Transplant Ureter Stent Placement 6fr;  Surgeon: Manpreet Mares MD;  Location: [facility] OR     PERCUTANEOUS NEPHROSTOMY  3/16/2012    Procedure:PERCUTANEOUS NEPHROSTOMY; Double J Stent Change melissa; Surgeon:GASTON LAU; Location:[facility] OR     TRANSPLANT KIDNEY RECIPIENT LIVING UNRELATED  7/22/2011    Procedure:TRANSPLANT KIDNEY RECIPIENT LIVING UNRELATED; transplanted into right iliac fossa.; Surgeon:MANPREET MARES; Location:[facility] OR       Family History   I have reviewed this patient's family history and updated it with pertinent information if needed.   Family History   Problem Relation Age of Onset     Genitourinary Problems Mother         PCKD     Cardiovascular Mother         brain aneurysm causing death     Genitourinary Problems Brother         PCKD     Genitourinary Problems Sister         PCKD     Diabetes Sister         on high dose steroid for kidney transplant       Social History   I have reviewed this patient's social history and updated it with pertinent information if needed. Awa Alexander  reports that she has never smoked. She has never used smokeless tobacco. She reports that she does not drink alcohol and does not use drugs.    Allergies   Allergies   Allergen Reactions     Cefazolin Other (See Comments)     Patient tolerated ceftriaxone and ceftazidime during 1/13/2020 admission to [facility] - ceftriaxone was tolerated after possible reaction to cefazolin.  Pt describes rxn as facial swelling     Flurbiprofen Other  "(See Comments)     Can not take due to kidney transplant  Can not take due to kidney transplant       Nsaids      PN: LW Reaction: KIDNEY PROBLEM     Prior to Admission Medications     [Held by provider] azaTHIOprine  100 mg Oral QAM     lactated ringers  1,000 mL Intravenous Once     predniSONE  10 mg Oral Daily     sodium bicarbonate  1,300 mg Oral BID     sodium chloride (PF)  3 mL Intracatheter Q8H     [START ON 2022] sulfamethoxazole-trimethoprim  1 tablet Oral Daily     [Held by provider] tacrolimus  0.5 mg Oral QPM     [Held by provider] tacrolimus  1 mg Oral QAM         Physical Exam   Temp  Av.2  F (36.8  C)  Min: 97.9  F (36.6  C)  Max: 98.5  F (36.9  C)      Pulse  Av  Min: 71  Max: 84 Resp  Av  Min: 16  Max: 18  SpO2  Av.2 %  Min: 99 %  Max: 100 %     BP 94/57 (BP Location: Left arm)   Pulse 71   Temp 98.5  F (36.9  C) (Oral)   Resp 16   Ht 1.638 m (5' 4.5\")   Wt 73.5 kg (162 lb)   SpO2 100%   BMI 27.38 kg/m      Admit Weight: 73.5 kg (162 lb)     GENERAL APPEARANCE: alert and no distress  HENT: mouth without ulcers or lesions  LYMPHATICS: no cervical or supraclavicular nodes  RESP: lungs clear to auscultation - no rales, rhonchi or wheezes  CV: regular rhythm, normal rate, no rub, no murmur  EDEMA: no LE edema bilaterally  ABDOMEN: soft, nondistended, nontender, bowel sounds normal  MS: extremities normal - no gross deformities noted, no evidence of inflammation in joints, no muscle tenderness  SKIN: no rash    Data   CMP  Recent Labs   Lab 22  0925 22  0835 22  1919 22  1256 22  1333     --  134* 127* 133   POTASSIUM 3.5  --  2.7* 3.2* 4.4   CHLORIDE 110*  --  104 99 103   CO2 16*  --  19* 17* 23   ANIONGAP 10  --  11 11 7   * 134* 174* 248* 249*   BUN 24.1*  --  37.6* 28 34*   CR 1.37*  --  2.01* 1.81* 1.05*   GFRESTIMATED 45*  --  28* 32* 62   NATASHA 8.3*  --  8.2* 8.8 8.6   MAG  --   --  2.0  --   --    PHOS  --   --  2.7 2.3* " 2.4*   PROTTOTAL 6.6  --  6.9  --   --    ALBUMIN 3.1*  --  3.4*  --   --    BILITOTAL 0.2  --  0.2  --   --    ALKPHOS 61  --  59  --   --    AST 22  --  13  --   --    ALT 12  --  15  --   --      CBC  Recent Labs   Lab 09/03/22  0925 09/02/22  1919 09/01/22  1256 08/29/22  1333   HGB 7.4* 7.7* 9.5* 8.8*   WBC 2.3* 1.6* 0.4* 3.6*   RBC 2.65* 2.71* 3.39* 3.09*   HCT 23.4* 22.7* 28.7* 27.4*   MCV 88 84 85 89   MCH 27.9 28.4 28.0 28.5   MCHC 31.6 33.9 33.1 32.1   RDW 14.3 14.1 14.1 14.3    150 227 220     INRNo lab results found in last 7 days.  ABG  Recent Labs   Lab 09/02/22  2120   PH 7.33      Urine Studies  Recent Labs   Lab Test 09/02/22  2041 06/27/22  2107 05/19/20  0918 11/26/14  1438   COLOR Light Yellow Yellow Yellow Yellow   APPEARANCE Clear Clear Clear Clear   URINEGLC Negative Negative Negative >=1000*   URINEBILI Negative Negative Negative Negative   URINEKETONE Negative Negative Negative Negative   SG 1.014 1.028 1.025 1.020   UBLD Negative Negative Negative Moderate*   URINEPH 6.0 5.5 6.0 6.0   PROTEIN 20 * 20 * 30* 30*   UROBILINOGEN  --   --  0.2 0.2   NITRITE Negative Negative Negative Positive*   LEUKEST Negative Moderate* Small* Negative   RBCU <1 5* 2-5* 5-10*   WBCU 6* 11* 10-25* O - 2     Recent Labs   Lab Test 01/12/22  0721 01/13/21  0731 05/19/20  0919 07/17/19  0723 01/16/19  0726 06/21/17  0721 12/16/14  0907   UTPG 0.51* 0.36* 0.50* 0.25* 0.17 0.14 0.18     PTH  No lab results found.  Iron Studies  Recent Labs   Lab Test 01/19/22  0719   IRON 64      IRONSAT 21   SHADY 143       IMAGING:  All imaging studies reviewed by me.        Physician Attestation     I saw and evaluated Awa KAY Moreirahalina as part of a shared APRN/PA visit.     I personally reviewed the vital signs, medications, labs and imaging.    I personally performed the substantive portion of the medical decision making for this visit - please see the SARINA's documentation for full details.    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 Renal function improved with IVF, needs further KCl and sodium bicarb supplementation with repeat labs     Eleno Ny MD  Date of Service (when I saw the patient): 09/03/22

## 2022-09-03 NOTE — ED PROVIDER NOTES
"ED Provider Note  [facility]      History     Chief Complaint   Patient presents with     Abnormal Labs     HPI  Awa Alexander is a 56 year old female who presents with dehydration and GISELLA She has a history of polycystic kidneys, Kidney transplant, Type 1 DM,  And post transplant lymphoproliferative disorder. She was started on CHOP chemotherapy 1 week ago.She was neutopenic yesterday. Labs were consistent with dehydration with GISELLA. She was referred here for evaluation..    She received her first course of CHOP chemotherapy 1 week ago after progressing on a course of another agent starting with \"R\". She has had copious diarrhea as well as anorexia for the past 4 days. Diarrhea has tapered off today. She reports 17 lb weight loss in the past week. She has nausea and anorexia. She has only vomited once. She denies fever, chills, URI symptoms, cough, sputum, shortness of breath, chest pain, abdominal pain, bloody diarrhea, dysuria, skin rash or bleeding symptoms. Creatinine was 1.8 in clinic yesterday and 2.2 today. Baseline creatinine is 1.2-1.6. She is neutropenic.      Past Medical History:   Diagnosis Date     Anemia in chronic kidney disease(285.21)      Diabetes mellitus (H) 2015     Dyslipidaemia      ESRD (end stage renal disease) (H)      High risk medication use      History of basal cell cancer     nose and ear     Immunosuppressed status (H)      Kidney replaced by transplant 2011     PKD (polycystic kidney disease) 1994       Past Surgical History:   Procedure Laterality Date     APPENDECTOMY       CHOLECYSTECTOMY       CYSTOSCOPY, REMOVE STENT(S), COMBINED  1/24/2013    Procedure: COMBINED CYSTOSCOPY, REMOVE STENT(S);  Removal of Double J Stent ;  Surgeon: Manpreet Lopes MD;  Location: [facility] OR     GYN SURGERY      supracervical hysterectomy.  pt has ovaries     INSERT STENT URETER  10/29/2012    Procedure: INSERT STENT URETER (PRE-OP);  Replacement of Single  J Stents with " "Tandem Stents;  Surgeon: Shahab Silva MD;  Location: [facility] OR     NEPHRECTOMY BILATERAL  12/21/2012    Procedure: NEPHRECTOMY BILATERAL;  Bilateral Native Nephrectomy with Ureteropylostomy, Appendectomy, Cholecystectomy, Transplant Ureter Stent Placement 6fr;  Surgeon: Manpreet Mares MD;  Location: [facility] OR     PERCUTANEOUS NEPHROSTOMY  3/16/2012    Procedure:PERCUTANEOUS NEPHROSTOMY; Double J Stent Change melissa; Surgeon:GASTON LAU; Location:[facility] OR     TRANSPLANT KIDNEY RECIPIENT LIVING UNRELATED  7/22/2011    Procedure:TRANSPLANT KIDNEY RECIPIENT LIVING UNRELATED; transplanted into right iliac fossa.; Surgeon:MANPREET MARES; Location:[facility] OR       Family History   Problem Relation Age of Onset     Genitourinary Problems Mother         PCKD     Cardiovascular Mother         brain aneurysm causing death     Genitourinary Problems Brother         PCKD     Genitourinary Problems Sister         PCKD     Diabetes Sister         on high dose steroid for kidney transplant       Social History     Tobacco Use     Smoking status: Never Smoker     Smokeless tobacco: Never Used   Substance Use Topics     Alcohol use: No     Alcohol/week: 0.0 standard drinks     Comment: \"not anymore since the transplant\"          Review of Systems   Constitutional: Positive for appetite change. Negative for chills and fever.   HENT: Negative for congestion and trouble swallowing.    Eyes: Negative for visual disturbance.   Respiratory: Negative for cough and shortness of breath.    Cardiovascular: Negative for chest pain.   Gastrointestinal: Positive for diarrhea and nausea. Negative for anal bleeding.   Genitourinary: Negative for difficulty urinating and dysuria.   Musculoskeletal: Negative for back pain.   Skin: Negative for pallor.   Neurological: Positive for light-headedness.   Hematological: Negative for adenopathy.   Psychiatric/Behavioral: Negative for confusion.       Physical Exam   BP: 97/69  Pulse: 84  Temp: 98.4  F (36.9 " " C)  Resp: 16  Height: 163.8 cm (5' 4.5\")  Weight: 73.5 kg (162 lb)  SpO2: 99 %  Physical Exam  Vitals and nursing note reviewed.   Constitutional:       General: She is not in acute distress.     Appearance: Normal appearance.   HENT:      Head: Normocephalic and atraumatic.      Right Ear: External ear normal.      Left Ear: External ear normal.      Nose: Nose normal.      Mouth/Throat:      Mouth: Mucous membranes are moist.   Eyes:      General: No scleral icterus.     Extraocular Movements: Extraocular movements intact.      Pupils: Pupils are equal, round, and reactive to light.   Cardiovascular:      Rate and Rhythm: Normal rate and regular rhythm.      Heart sounds: No murmur heard.  Pulmonary:      Effort: Pulmonary effort is normal.      Breath sounds: Normal breath sounds. No wheezing or rales.   Abdominal:      General: Abdomen is flat.      Palpations: Abdomen is soft.      Tenderness: There is no abdominal tenderness. There is no guarding.   Musculoskeletal:      Cervical back: Normal range of motion.      Right lower leg: No edema.      Left lower leg: No edema.   Skin:     General: Skin is warm and dry.   Neurological:      General: No focal deficit present.      Mental Status: She is alert and oriented to person, place, and time.   Psychiatric:         Mood and Affect: Mood normal.         Behavior: Behavior normal.         ED Course      Procedures              Labs/Imaging    Results for orders placed or performed during the hospital encounter of 09/02/22 (from the past 24 hour(s))   CBC with platelets differential    Narrative    The following orders were created for panel order CBC with platelets differential.  Procedure                               Abnormality         Status                     ---------                               -----------         ------                     CBC with platelets and d...[[id number]]  Abnormal            Final result               Manual " Differential[[id number]]          Abnormal            Final result                 Please view results for these tests on the individual orders.   Comprehensive metabolic panel   Result Value Ref Range    Sodium 134 (L) 136 - 145 mmol/L    Potassium 2.7 (L) 3.4 - 5.3 mmol/L    Creatinine 2.01 (H) 0.51 - 0.95 mg/dL    Urea Nitrogen 37.6 (H) 6.0 - 20.0 mg/dL    Chloride 104 98 - 107 mmol/L    Carbon Dioxide (CO2) 19 (L) 22 - 29 mmol/L    Anion Gap 11 7 - 15 mmol/L    Glucose 174 (H) 70 - 99 mg/dL    Calcium 8.2 (L) 8.6 - 10.0 mg/dL    Protein Total 6.9 6.4 - 8.3 g/dL    Albumin 3.4 (L) 3.5 - 5.2 g/dL    Bilirubin Total 0.2 <=1.2 mg/dL    Alkaline Phosphatase 59 35 - 104 U/L    AST 13 10 - 35 U/L    ALT 15 10 - 35 U/L    GFR Estimate 28 (L) >60 mL/min/1.73m2   CBC with platelets and differential   Result Value Ref Range    WBC Count 1.6 (L) 4.0 - 11.0 10e3/uL    RBC Count 2.71 (L) 3.80 - 5.20 10e6/uL    Hemoglobin 7.7 (L) 11.7 - 15.7 g/dL    Hematocrit 22.7 (L) 35.0 - 47.0 %    MCV 84 78 - 100 fL    MCH 28.4 26.5 - 33.0 pg    MCHC 33.9 31.5 - 36.5 g/dL    RDW 14.1 10.0 - 15.0 %    Platelet Count 150 150 - 450 10e3/uL   Lipase   Result Value Ref Range    Lipase 34 13 - 60 U/L   Troponin T, High Sensitivity   Result Value Ref Range    Troponin T, High Sensitivity 16 (H) <=14 ng/L   Magnesium   Result Value Ref Range    Magnesium 2.0 1.7 - 2.3 mg/dL   Manual Differential   Result Value Ref Range    % Neutrophils 82 %    % Lymphocytes 4 %    % Monocytes 12 %    % Eosinophils 0 %    % Basophils 0 %    % Myelocytes 2 %    NRBCs per 100 WBC 2 (H) <=0 %    Absolute Neutrophils 1.3 (L) 1.6 - 8.3 10e3/uL    Absolute Lymphocytes 0.1 (L) 0.8 - 5.3 10e3/uL    Absolute Monocytes 0.2 0.0 - 1.3 10e3/uL    Absolute Eosinophils 0.0 0.0 - 0.7 10e3/uL    Absolute Basophils 0.0 0.0 - 0.2 10e3/uL    Absolute Myelocytes 0.0 <=0.0 10e3/uL    Absolute NRBCs 0.0 <=0.0 10e3/uL    RBC Morphology Confirmed RBC Indices     Platelet Assessment   Automated Count Confirmed. Platelet morphology is normal.     Automated Count Confirmed. Platelet morphology is normal.         Medications   0.9% sodium chloride BOLUS (1,000 mLs Intravenous New Bag 9/2/22 1945)     Followed by   sodium chloride 0.9% infusion (has no administration in time range)   potassium chloride (KLOR-CON) Packet 40 mEq (has no administration in time range)        Assessments & Plan (with Medical Decision Making)   Impression:  Middle aged female with a history of polycystic kidneys, kidney transplant and more recent diagnosis of post transplant lymphoproliferative disorder (being treated at [facility]). She had a first course of CHOP chemotherapy 1 week ago. She has been experiencing diarrhea, anorexia, nausea and 17 lb weight loss associated with GISELLA, hypokalemia and mild metabolic acidosis. She was leukopenic yesterday with WBC of 0.3. WBC is improving today. In setting of significant weight loss, diarrhea and GISELLA in transplant kidney she will be admitted for medicine observation for ongoing hydration and electrolyte replacement.    I have reviewed the nursing notes. I have reviewed the findings, diagnosis, plan and need for follow up with the patient.    New Prescriptions    No medications on file       Final diagnoses:   Dehydration   Acute kidney injury (H)   Post-transplant lymphoproliferative disorder (H)   S/P chemotherapy, time since less than 4 weeks   S/P kidney transplant   Diarrhea, unspecified type       --  Miguel Starr  [facility] EMERGENCY DEPARTMENT  9/2/2022     Miguel Starr MD  09/02/22 2024

## 2022-09-03 NOTE — DISCHARGE SUMMARY
[facility]  Hospitalist Discharge Summary      Date of Admission:  9/2/2022  Date of Discharge:  9/3/2022  Discharging Provider: Faviola Pittman PA-C / Marcellus Jones MD   Discharge Service: Hospitalist Service, GOLD TEAM 6    Discharge Diagnoses   GISELLA on CKD  Non-anion gap metabolic acidosis   Hypokalemia   Leukopenia   Troponinemia    PTLD on R-CHOP  DM2     Follow-ups Needed After Discharge     Unresulted Labs Ordered in the Past 30 Days of this Admission     Date and Time Order Name Status Description    9/3/2022  1:03 PM Magnesium In process     9/2/2022  7:27 PM Blood Culture Peripheral Blood Preliminary     9/2/2022  7:27 PM Blood Culture Peripheral Blood Preliminary       These results will be followed up by hospitalist discharge pool and discharge provider.     Discharge Disposition   Discharged to home   Condition at discharge: Stable    Hospital Course    Awa Alexander is a 56 year old female with PMHx of polycystic kidney disease s/p renal transplant in 2011 c/b by PTLD now on R-CHOP managed by [facility], and DM2, admitted on 9/2/2022 for an GISELLA, hypokalemia, in setting of recent diarrhea after chemotherapy.      # GISELLA on CKD, resolved   # Polycystic kidney disease s/p renal transplant (2011)   Patient appeared hypovolemic in setting of GI losses from chemotherapy likely secondary to R CHOP. Has had difficulty with severe diarrhea (which has since resolved, last episode on Thursday) and anorexia. BL creatinine 1.2-1.4, was 1.8 in clinic yesterday, 2.0 on admission to ED. Patient received aggressive IVF, and creatinine returned to baseline. No further diarrhea occursed and patient tolerating PO. Continued PTA IS with prednisone 10 mg daily (imuran and tacrolimus on temporary hold while on chemotherapy managed by outpatient transplant nephrologist at [facility]). Continued PJP ppx with bactrim.  Transplant nephrology consulted with plan to continue close monitoring  with outpatient labs and follow up, next BMP on 9/6.    # Hypokalemia - resolved.  Potassium repleted per sliding scale repletion. Patient started on KCl 40 mEq daily x3 day with repeat BMP on 9/6.     # NAGMA Secondary to diarrhea and. Bicarb >15 still. Transplant nephrology consulted, and recommended increasing PTA sodium bicarb 1300 mg BID with repeat BMP in 3 days.      # PTLD  # Leukopenia, ANC >1, no neutropenia   # Acute on chronic anemia  On R-CHOP at [facility]. Concerning for this leading to diarrhea given severity of colonic lesions. Oncology consulted during hospital stay, and recommend outpatient follow up with [facility] oncology. Recommend repeat CBC with diff on 9/6.      # Troponinemia -Likely secondary to hypovolemia given significant dehydration. No chest pain or SOB. Repeat level normal.      # DM II - Hemoglobin A1c 6.0 on 6/27/22. Mostly diet controlled, takes weekly ozempic which was continued.     Consultations This Hospital Stay   NEPHROLOGY KIDNEY/PANCREAS TRANSPLANT ADULT IP CONSULT  ONCOLOGY ADULT IP CONSULT  HEMATOLOGY ADULT IP CONSULT    Code Status   Prior    Time Spent on this Encounter   I, BOGDAN Cervantes CNP, personally saw the patient today and spent less than or equal to 30 minutes discharging this patient.      The findings and plan were discussed with Dr. Marcellus Jones who agreed with the plan.     Faviola LEACH [facility] EMERGENCY DEPARTMENT  [address] [place] [address]  Phone: [phone]  ______________________________________________________________________    Physical Exam   Vital Signs: Temp: 98.5  F (36.9  C) Temp src: Oral BP: 94/57 Pulse: 71   Resp: 16 SpO2: 100 % O2 Device: None (Room air)    Weight: 162 lbs 0 oz  GENERAL: Alert and awake. Answering questions appropriately. NAD. Pleasant and conversational.  HEENT: AT/NC. Anicteric sclera. Mucous membranes moist   CARDIOVASCULAR: RRR. S1, S2. No murmurs, rubs, or gallops.   RESPIRATORY: Effort  normal on RA. Clear to auscultation bilaterally, no rales, rhonchi or wheezes, respirations unlabored   GI: Abdomen soft, non-tender abdomen without rebound or guarding, normoactive bowel sounds present  EXTREMITIES: No peripheral edema. No calf asymmetry, erythema, or tenderness.   NEUROLOGICAL:  CN II-XII grossly intact. Moving all extremities symmetrically.   SKIN: Intact. Warm and dry. No jaundice.        Primary Care Physician   Moisés Lopes    Discharge Orders      Reason for your hospital stay    Dear Awa Alexander    Your were hospitalized at [facility] with dehydration, acute kidney injury and low potassium and treated with intravenous fluids, potassium supplements, and sodium bicarb tablets.  Over your hospitalization your symptoms and labs improved and today you are ready to be discharged home.  If you continue to drink plenty of fluids and take your medication therapy you should continue to improve but if you develop fever, shortness of breath, light headedness, chest pain, diarrhea, weakness, or any other concerning symptoms please seek medical attention.    We are suggesting the following medication changes:  - Potassium chloride 40 mEq daily for 3 days until repeat labs  - Sodium Bicarbonate tablets 1300 mg twice daily until repeat labs     Please get the following tests done:  - Repeat BMP in 3 days    Please set up an appointment with:  - Follow up with PCP in 1 week  - Follow up with transplant nephrology in 1-2 weeks  - Follow up with heme/oncology in 1-2 weeks     It was a pleasure meeting with you today. Thank you for allowing me and my team the privilege of caring for you today. You are the reason we are here, and I truly hope we provided you with the excellent service you deserve. Please let us know if there is anything else we can do for you so that we can be sure you are leaving completely satisfied with your care experience.    Take care!  [facility]  Hospitalist Group     Adult [facility] Follow-up and recommended labs and tests    - Follow up with PCP in 1 week  - Follow up with transplant nephrology in 1-2 weeks  - Follow up with heme/oncology in 1-2 weeks     Repeat BMP and CBC with diff in 3 days.     Appointments on [place] and/or [facility] (with [facility] or [facility] provider or service). Call [phone] if you haven't heard regarding these appointments within 7 days of discharge.     Activity    Your activity upon discharge: activity as tolerated     When to contact your care team    Call your PCP or return to ED for temperatures > 100.4 degrees, worsening or changing pain, uncontrolled vomiting or inability to tolerate oral intake, new or worsening diarrhea, new or worsening shortness of breath, decreased urine output, yellowing of the eyes or skin, confusion, weakness, blood in urine or stools, or any other concerning symptoms.     Diet    Follow this diet upon discharge: Orders Placed This Encounter      Combination Diet Regular Diet Adult, high potassium diet       Significant Results and Procedures   Results for orders placed or performed during the hospital encounter of 06/27/22   XR Chest 2 Views    Narrative    EXAM: XR CHEST 2 VW  LOCATION: [facility]  DATE/TIME: 6/27/2022 10:45 PM    INDICATION: Shortness of breath.  COMPARISON: Chest x-ray single view 3/16/2012.      Impression    IMPRESSION: Resolved plate-like atelectasis in the right lower lung seen previously. Both lungs are clear today. No adenopathy. Normal cardiac size and pulmonary vascularity. Mildly atherosclerotic thoracic aorta. Mild degenerative changes both shoulders   and the spine. Cholecystectomy clips.   Abd/pelvis CT no contrast - Stone Protocol    Narrative    EXAM: CT ABDOMEN PELVIS W/O CONTRAST  LOCATION: [facility]  DATE/TIME: 6/27/2022 10:42 PM    INDICATION: LLQ pain. History of renal transplant, refusing  contrast.  COMPARISON: CT chest, abdomen and pelvis without IV contrast 1/28/2022.  TECHNIQUE: CT scan of the abdomen and pelvis was performed without IV contrast. Multiplanar reformats were obtained. Dose reduction techniques were used.  CONTRAST: None.    FINDINGS:   LOWER CHEST: Clear. No pleural effusion on either side. Normal cardiac size. No pericardial effusion.    HEPATOBILIARY: Cholecystectomy. No discrete hepatic lesion.    PANCREAS: Normal.    SPLEEN: Normal.    ADRENAL GLANDS: Normal.    KIDNEYS/BLADDER: Native kidneys have been resected. Right lower quadrant renal transplant without stones, obstruction or adjacent free fluid.    BOWEL: Interval development of irregular thickening of a loop of mid and distal ileum (images 113-148, series 4, images 119-36, series 5, images 38-60, series 6), several loops of upstream small bowel extending over a length of approximately 15 cm   (images 20-31, series 5), worrisome for primary small bowel lymphoma. Upstream ectasia of a few small bowel loops representing partial mechanical obstruction. No free gas or free fluid. Formed stool material within normal caliber colon.    LYMPH NODES: Numerous prominent mesenteric nodes, a representative node in the left abdomen measures 2.7 x 1.5 cm (image 85, series 4), unchanged. Another node in the mesentery measures 1.9 x 1.1 cm, also stable.    VASCULATURE: Atherosclerotic normal caliber distal abdominal aorta measuring 1.8 x 1.8 cm (image 104, series 4). Normal caliber IVC.    PELVIC ORGANS: Right lower quadrant renal transplant without stones, obstruction or adjacent free fluid. Irregular thickening of a short segment of the mid/distal ileum. No pelvic sidewall or inguinal adenopathy. Vascular calcifications. No free fluid.   Hysterectomy. Phleboliths.    MUSCULOSKELETAL: Mild degenerative changes involving the spine and pelvis. Slight right convex lumbar curve.      Impression    IMPRESSION:   1.  Right lower quadrant renal  transplant without stones, obstruction or adjacent free fluid. Native kidneys have been resected.    2.  Interval development of irregular thickening of a loop of mid/distal ileum, worrisome for primary small bowel lymphoma. Upstream ectasia of a few small bowel loops representing partial mechanical obstruction, free gas or free fluid. Numerous   mesenteric nodes, stable.    3.  Cholecystectomy. Hysterectomy.    4.  Findings discussed with the referring physician, Dr. Ceasar Licea, following the scan at 2335 hours.    NOTE: ABNORMAL REPORT    THE DICTATION ABOVE DESCRIBES AN ABNORMALITY FOR WHICH FOLLOW-UP IS NEEDED.     US Lower Extremity Venous Duplex Bilateral    Narrative    EXAM: US LOWER EXTREMITY VENOUS DUPLEX BILATERAL  LOCATION: [facility]  DATE/TIME: 6/27/2022 10:23 PM    INDICATION: Pain and swelling.  COMPARISON: None.  TECHNIQUE: Venous Duplex ultrasound of bilateral lower extremities with and without compression, augmentation and duplex. Color flow and spectral Doppler with waveform analysis performed.    FINDINGS: Exam includes the common femoral, femoral, popliteal veins as well as segmentally visualized deep calf veins and greater saphenous vein.     RIGHT: No deep vein thrombosis. No superficial thrombophlebitis. No popliteal cyst.    LEFT: No deep vein thrombosis. No superficial thrombophlebitis. No popliteal cyst.      Impression    IMPRESSION:  1.  No deep venous thrombosis in the bilateral lower extremities.   NM Lung Scan Perfusion Particulate    Narrative    EXAM: NM LUNG SCAN PERFUSION PARTICULATE  LOCATION: [facility]  DATE/TIME: 6/28/2022 7:37 AM    INDICATION: dyspnea, chest pain, elevated D dimer (has bowel obstruction, possible lymphoma, prior renal transplant)  COMPARISON: CXR 06/27/2022 reviewed.  TECHNIQUE: 3.6 mCi technetium-99m MAA, IV. Standard lung perfusion imaging.    FINDINGS: Normal perfusion to both lungs. No segmental  perfusion defects.      Impression    IMPRESSION:   Negative for pulmonary embolism.       Discharge Medications   Discharge Medication List as of 9/3/2022  1:40 PM      START taking these medications    Details   potassium chloride (KLOR-CON) 20 MEQ packet Take 40 mEq by mouth daily for 3 days, Disp-6 packet, R-0, E-Prescribe         CONTINUE these medications which have CHANGED    Details   sodium bicarbonate 650 MG tablet Take 2 tablets (1,300 mg) by mouth 2 times daily for 3 days, Disp-180 tablet, R-3, E-Prescribe         CONTINUE these medications which have NOT CHANGED    Details   azaTHIOprine (IMURAN) 50 MG tablet Take 100 mg by mouth every morning, Historical      blood glucose (PHUONG CONTOUR NEXT) test strip Use to test blood sugar three times daily or as directed., Disp-100 strip, R-prn, E-Prescribe      cholecalciferol (VITAMIN  -D) 1000 UNITS capsule Take 5 capsules (5,000 Units) by mouth daily, Disp-450 capsule, R-1, E-Prescribe      fish oil-omega-3 fatty acids 1000 MG capsule Take 1 g by mouth 2 times daily, Historical      OZEMPIC, 1 MG/DOSE, 2 MG/1.5ML pen Inject 0.5 mg Subcutaneous every 7 days Every Sunday., ALEC, Historical      predniSONE (DELTASONE) 10 MG tablet Take 10 mg by mouth daily, Historical      sulfamethoxazole-trimethoprim (BACTRIM) 400-80 MG tablet Take 1 tablet by mouth every other day, Disp-45 tablet, R-3, E-Prescribe      tacrolimus (GENERIC EQUIVALENT) 0.5 MG capsule Take 1 capsule (0.5 mg) by mouth 2 times daily, Disp-180 capsule, R-3, E-PrescribeTXP DT 7/22/2011 (Kidney) TXP Dischg DT 7/26/2011 DX Kidney replaced by transplant Z94.0 TX [facility] ([place], [place])      tacrolimus (GENERIC EQUIVALENT) 1 MG capsule Take 1 mg by mouth every morning, Historical           Allergies   Allergies   Allergen Reactions     Cefazolin Other (See Comments)     Patient tolerated ceftriaxone and ceftazidime during 1/13/2020 admission to [facility] - ceftriaxone was tolerated after possible reaction to cefazolin.  Pt describes rxn as facial swelling     Flurbiprofen Other (See Comments)     Can not take due to kidney transplant  Can not take due to kidney transplant       Nsaids      PN: LW Reaction: KIDNEY PROBLEM

## 2022-09-03 NOTE — H&P
[facility]    History and Physical - Hospitalist Service, GOLD TEAM        Date of Admission:  9/2/2022    Assessment & Plan      Awa Alexander is a 56 year old female admitted on 9/2/2022. She has a PMH of polycystic kidney disease s/p renal tx in 2011 c/b by PTLD now on R-CHOP managed by [facility], also with DM II who presents due to 3 days of diarrhea with 17 lb weight loss in 1 week and GISELLA.     GISELLA on CKD  Polycystic kidney disease s/p renal tx 2011  Hypovolemic due to GI losses from chemotherapy likely secondary to R CHOP. Has had difficulty with severe diarrhea and anorexia. BL creatinine 1.2-1.4, was 1.8 in clinic yesterday, 2.0 on admission to ED.    - IVF, got 1 L in ED, will continue at 125cc/hr overnight   - Transplant neph consult in AM   - Continue prednisone 10mg daily (stopped imuran and tacro per MD at [place])     PTLD  Leukopenia, ANC >1, no neutropenia   Acute on chronic anemia  On R-CHOP at [place]. Concerning for this leading to diarrhea given severity of colonic lesions.    - Oncology consulted in AM   - Continue to monitor CBC, may need transfusion given ongoing dilution with fluid resuscitation    Hypokalemia  NAGMA  Secondary to diarrhea. Bicarb >15 still   - Replete lytes PRN, not on RN protocol for potassium due to GISELLA   - Continue PTA bicarb 650mg BID    Troponinemia  Likely secondary to hypovolemia given significant dehydration. No chest pain or SOB.   - Will recheck and monitor.     DM II  Mostly diet controlled, takes weekly ozempic   - Ozempic due on 9/4       Diet: Combination Diet Regular Diet Adult    DVT Prophylaxis: Ambulate every shift  Zeng Catheter: Not present  Central Lines: None  Cardiac Monitoring: None  Code Status: Full Code      Clinically Significant Risk Factors Present on Admission        # Hypokalemia: K = 2.7 mmol/L (Ref range: 3.4 - 5.3 mmol/L) on admission, will replace as needed       # Acute Kidney Injury,  "unspecified: based on a >150% or 0.3 mg/dL increase in creatinine on admission compared to past 90 day average, will monitor renal function       # Overweight: Estimated body mass index is 27.38 kg/m  as calculated from the following:    Height as of this encounter: 1.638 m (5' 4.5\").    Weight as of this encounter: 73.5 kg (162 lb).        Disposition Plan      Expected Discharge Date: 09/03/2022        Discharge Comments: pendin improving of creatinine      The patient's care was discussed with the Bedside Nurse and Patient.    Do Goldman MD  Hospitalist Service, [facility]  Securely message with the Vocera Web Console (learn more here)  Text page via [employer] Paging/Directory   Please see signed in provider for up to date coverage information      ______________________________________________________________________    Chief Complaint   Dehydration    History is obtained from the patient and electronic health record    History of Present Illness   Awa Alexander is a 56 year old female who has a PMH of polycystic kidney disease s/p renal tx in 2011 c/b by PTLD now on R-CHOP managed by [facility], also with DM II. Started R CHOP last week at [facility], had 3 days of diarrhea and increased urination which has led to a 17 lb weight loss in the last week. She said she did gain 7 lbs of \"water\" after getting her chemo last week, so part of this was not surprising. She has had a harder time with eating since starting RCHOP and states food tastes like rubber. Has not had any blood in her stools. Had one episode of nausea that occurred after brushing her teeth but resolved after taking a nausea medication, no emesis with this. Did have one episode of heart burn as well. No edema. No chest pain or SOB. No abdominal pain with the diarrhea.     Review of Systems    The 10 point Review of Systems is negative other than noted in the HPI or here.     Past Medical " History    I have reviewed this patient's medical history and updated it with pertinent information if needed.   Past Medical History:   Diagnosis Date     Anemia in chronic kidney disease(285.21)      Diabetes mellitus (H) 2015     Dyslipidaemia      ESRD (end stage renal disease) (H)      High risk medication use      History of basal cell cancer     nose and ear     Immunosuppressed status (H)      Kidney replaced by transplant 2011     PKD (polycystic kidney disease) 1994       Past Surgical History   I have reviewed this patient's surgical history and updated it with pertinent information if needed.  Past Surgical History:   Procedure Laterality Date     APPENDECTOMY       CHOLECYSTECTOMY       CYSTOSCOPY, REMOVE STENT(S), COMBINED  1/24/2013    Procedure: COMBINED CYSTOSCOPY, REMOVE STENT(S);  Removal of Double J Stent ;  Surgeon: Manpreet Mares MD;  Location: [facility] OR     GYN SURGERY      supracervical hysterectomy.  pt has ovaries     INSERT STENT URETER  10/29/2012    Procedure: INSERT STENT URETER (PRE-OP);  Replacement of Single  J Stents with Tandem Stents;  Surgeon: Shahab Silva MD;  Location: [facility] OR     NEPHRECTOMY BILATERAL  12/21/2012    Procedure: NEPHRECTOMY BILATERAL;  Bilateral Native Nephrectomy with Ureteropylostomy, Appendectomy, Cholecystectomy, Transplant Ureter Stent Placement 6fr;  Surgeon: Manpreet Mares MD;  Location: [facility] OR     PERCUTANEOUS NEPHROSTOMY  3/16/2012    Procedure:PERCUTANEOUS NEPHROSTOMY; Double J Stent Change melissa; Surgeon:GASTON LAU; Location:[facility] OR     TRANSPLANT KIDNEY RECIPIENT LIVING UNRELATED  7/22/2011    Procedure:TRANSPLANT KIDNEY RECIPIENT LIVING UNRELATED; transplanted into right iliac fossa.; Surgeon:MANPREET MARES; Location:[facility] OR       Social History   I have reviewed this patient's social history and updated it with pertinent information if needed.  Social History     Tobacco Use     Smoking status: Never Smoker     Smokeless tobacco: Never Used  "  Substance Use Topics     Alcohol use: No     Alcohol/week: 0.0 standard drinks     Comment: \"not anymore since the transplant\"     Drug use: No       Family History   I have reviewed this patient's family history and updated it with pertinent information if needed.  Family History   Problem Relation Age of Onset     Genitourinary Problems Mother         PCKD     Cardiovascular Mother         brain aneurysm causing death     Genitourinary Problems Brother         PCKD     Genitourinary Problems Sister         PCKD     Diabetes Sister         on high dose steroid for kidney transplant       Prior to Admission Medications   Prior to Admission Medications   Prescriptions Last Dose Informant Patient Reported? Taking?   OZEMPIC, 1 MG/DOSE, 2 MG/1.5ML pen  Self Yes No   Sig: Inject 0.5 mg Subcutaneous every 7 days Every Sunday.   azaTHIOprine (IMURAN) 50 MG tablet  Self Yes No   Sig: Take 100 mg by mouth every morning   blood glucose (PHUONG CONTOUR NEXT) test strip  Self No No   Sig: Use to test blood sugar three times daily or as directed.   Patient not taking: No sig reported   cholecalciferol (VITAMIN  -D) 1000 UNITS capsule  Self No No   Sig: Take 5 capsules (5,000 Units) by mouth daily   Patient taking differently: Take 1 capsule by mouth daily   fish oil-omega-3 fatty acids 1000 MG capsule  Self Yes No   Sig: Take 1 g by mouth 2 times daily   sodium bicarbonate 650 MG tablet  Self No No   Sig: Take 1 tablet (650 mg) by mouth 2 times daily   Patient taking differently: Take 650 mg by mouth 2 times daily as needed (low bicarbonate on labs)   sulfamethoxazole-trimethoprim (BACTRIM) 400-80 MG tablet  Self No No   Sig: Take 1 tablet by mouth every other day   tacrolimus (GENERIC EQUIVALENT) 0.5 MG capsule  Self No No   Sig: Take 1 capsule (0.5 mg) by mouth 2 times daily   Patient taking differently: Take 0.5 mg by mouth every evening   tacrolimus (GENERIC EQUIVALENT) 1 MG capsule  Self Yes No   Sig: Take 1 mg by mouth " every morning      Facility-Administered Medications: None     Allergies   Allergies   Allergen Reactions     Cefazolin Other (See Comments)     Patient tolerated ceftriaxone and ceftazidime during 1/13/2020 admission to [facility] - ceftriaxone was tolerated after possible reaction to cefazolin.  Pt describes rxn as facial swelling     Flurbiprofen Other (See Comments)     Can not take due to kidney transplant  Can not take due to kidney transplant       Nsaids      PN: LW Reaction: KIDNEY PROBLEM       Physical Exam   Vital Signs: Temp: 98.4  F (36.9  C) Temp src: Oral BP: 100/69 Pulse: 74   Resp: 16 SpO2: 99 % O2 Device: None (Room air)    Weight: 162 lbs 0 oz    Gen: NAD, alert, pleasant, cooperative, non-toxic, mildly decreased skin turgor  HEENT: EOMI, no conjunctival icterus, tracking appropriately  Resp: CTAB, no crackles or wheezes, no increased WOB  Cardiac: RRR, no S3/S4, no M/R/G appreciated  GI: soft, non-tender, non-distended, increased bowel sounds  Ext: WWP, no edema, spontaneous movement in all 4  Neuro: AOx3, CN 2-12 grossly intact, appropriate mentation    Data   Data reviewed today: I reviewed all medications, new labs and imaging results over the last 24 hours. Labs and Imaging reviewed in Epic, pertinent discussed in A&P.

## 2022-09-03 NOTE — PLAN OF CARE
"Shift: 4477-0085  VS: /62 (BP Location: Left arm)   Pulse 78   Temp 97.9  F (36.6  C) (Oral)   Resp 18   Ht 1.638 m (5' 4.5\")   Wt 73.5 kg (162 lb)   SpO2 99%   BMI 27.38 kg/m     Pain: Denies pain overnight.   Neuro: A/Ox4  Respiratory: On RA  Diet/Appetite: Did not have dinner, but snack is given to pt overnight.    /GI: Abdominal discomfort   LDA's: NS running at 125mL/hr  Activity: Stand by assist  Pertinent labs: Am labs     Plan: Maintain hydration     "

## 2022-09-03 NOTE — CONSULTS
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 Habermann ([facility])  Current Treatment Plan: R-CHOP (C1D1 8/23/22)    Reason for Consult: \"on CHOP for PTLD, please assist with dehydration/symptoms and organization of follow up - normally gets care at [place]\"    Assessment:   Awa Alexander is a 56 year old female with renal transplant-associated PTLD recently started on RCHOP who is admitted for diarrhea and GISELLA     1. 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 Chemotherapy induced pancytopenia, improving  b. Chemotherapy induced diarrhea, improving  2. GISELLA in the setting of PCKD s/p LUKT 7/22/11 and b/l nephrectomies 12/21/12    Consults: Nephrology  Access: peripheral    Plan & Recommendations:   Patient was in the processes of discharging when I met her in the ER. Her diarrhea and GISELLA have resolved with IVF and antiemetics. She has all her care at [facility] and has been in touch with her care coordinator to schedule follow up. No further recommendations at this time    We will continue to follow this patient. Please do not hesitate to page with any questions or concerns.  Patient was seen and plan of care was discussed with attending physician Dr. Waller.    Rosas Méndez MD   PGY5 Hematology/Oncology Fellow   AMCOM: \"MEDICINE HEMATOLOGY/ONCOLOGY\"    History of Present Illness:    Awa Alexander presented on day 9 of her first cycle of R-CHOP for PTLD with diarrhea and was found to have an GISELLA. She was treated with antiemetics and IV fluids overnight. She expresses frustration that she has been in the ER and never admitted to a bed in the hospital, but otherwise she has no other current symptoms and no questions for the oncology team.     Oncologic History:  Per [facility] onc clinic visit 8/23/22:    July 22, 2011: The patient " underwent a living unrelated kidney transplant at the [facility]. The transplant was complicated by mild acute rejection as well as a stenosis of the ureter. She had a JJ stent in place for 3 months. 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 She had a bilateral native nephrectomy due to large polycystic kidneys and had a ureterostomy. By report from Dr. Diaz, the patient was EBV negative.    2013: The patient was weaned off prednisone. He had new diabetes.    2020: The patient was having chronic diarrhea and was switch from MMF to azathioprine 150 mg per day.  January 2020: The EBV DNA was [phone] at the [facility].    January 28, 2022: The patient had a CT scan of the abdomen and chest. There were a few pulmonary nodules measuring up to 4 mm in size. The CT scan of the abdomen revealed mesenteric nodes with the largest measuring 2.8 cm in transverse diameter. This was reviewed by Dr. Elder Shaw. After I saw the patient initially. There was concern about biopsying these lesions.    February 8, 2022: The patient was on azathioprine 50 mg 2 per day and tacrolimus 0.5 mg b.i.d.. There was no palpable lymphadenopathy. The case was discussed with Dr. Diaz. Orders were placed for a PET scan, but pre-approval was sought in the interim. The EBV DNA level was 43. On March 23, 2022 it was 64.    June 1, 2022: The EBV DNA level was less than 35.    June 29, 2022: The patient was transferred to [facility] a partial small-bowel obstruction.    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    July 1, 2022: Rituximab was started in the hospital.    July 13, 2022: The patient returned. Hemoglobin was stable at 9.2 grams/deciliter, white blood cell count 3000, and absolute lymphocyte count of 560. The creatinine was 1.39 with an EGFR of 49. The patient feels  " pretty good . Does have intermittent discomfort in the stomach. The BK and CMV were undetected. The EBV DNA was less than 35 on June 29, 2022.    August 22, 2022: The patient returned. The patient has had progressive abdominal pain. She has had episodes of emesis. The physical examination revealed a palpable mass in the abdomen which was 11 cm with calipers determination. The SUV max was 26.6 of a mass on the PET scan which was 12.2 x 8.6 cm. The patient was admitted to the hospital.    Per [place] d/c summary 8/26/22:  \"Ms. Awa Alexander is a 56-year-old female with monomorphic, EBV negative DLBCL-like PTLD in the setting of LURD in 2011. She was diagnosed in June 2022 after presenting with a partial small bowel obstruction. She commenced treatment with weekly Rituxan 7/2022 and has received 4 doses. Repeat PET-CT 8/22/22 showed new LAD and persistent small bowel mass. She admitted to Hematology 1 service on 8/23/2022 for cycle 1 R-CHOP. \"    Subjective    Review of Systems:  A comprehensive ROS was performed and found to be negative or non-contributory with the exception of that noted in the HPI above.    Past Medical History:   Diagnosis Date     Anemia in chronic kidney disease(285.21)      Diabetes mellitus (H) 2015     Dyslipidaemia      ESRD (end stage renal disease) (H)      High risk medication use      History of basal cell cancer     nose and ear     Immunosuppressed status (H)      Kidney replaced by transplant 2011     PKD (polycystic kidney disease) 1994     Past Surgical History:   Procedure Laterality Date     APPENDECTOMY       CHOLECYSTECTOMY       CYSTOSCOPY, REMOVE STENT(S), COMBINED  1/24/2013    Procedure: COMBINED CYSTOSCOPY, REMOVE STENT(S);  Removal of Double J Stent ;  Surgeon: Manpreet Lopes MD;  Location: [facility] OR     GYN SURGERY      supracervical hysterectomy.  pt has ovaries     INSERT STENT URETER  10/29/2012    Procedure: INSERT STENT URETER (PRE-OP);  Replacement of Single  J Stents with " "Tandem Stents;  Surgeon: Shahab Silva MD;  Location: [facility] OR     NEPHRECTOMY BILATERAL  12/21/2012    Procedure: NEPHRECTOMY BILATERAL;  Bilateral Native Nephrectomy with Ureteropylostomy, Appendectomy, Cholecystectomy, Transplant Ureter Stent Placement 6fr;  Surgeon: Manpreet Mares MD;  Location: [facility] OR     PERCUTANEOUS NEPHROSTOMY  3/16/2012    Procedure:PERCUTANEOUS NEPHROSTOMY; Double J Stent Change melissa; Surgeon:GASTON LAU; Location:[facility] OR     TRANSPLANT KIDNEY RECIPIENT LIVING UNRELATED  7/22/2011    Procedure:TRANSPLANT KIDNEY RECIPIENT LIVING UNRELATED; transplanted into right iliac fossa.; Surgeon:MANPREET MARES; Location:[facility] OR     Social History     Socioeconomic History     Marital status: Single   Tobacco Use     Smoking status: Never Smoker     Smokeless tobacco: Never Used   Substance and Sexual Activity     Alcohol use: No     Alcohol/week: 0.0 standard drinks     Comment: \"not anymore since the transplant\"     Drug use: No     Sexual activity: Yes     Partners: Male   Other Topics Concern     Parent/sibling w/ CABG, MI or angioplasty before 65F 55M? No      Family History   Problem Relation Age of Onset     Genitourinary Problems Mother         PCKD     Cardiovascular Mother         brain aneurysm causing death     Genitourinary Problems Brother         PCKD     Genitourinary Problems Sister         PCKD     Diabetes Sister         on high dose steroid for kidney transplant     (Not in a hospital admission)    Current Outpatient Medications   Medication Sig Dispense Refill     cholecalciferol (VITAMIN  -D) 1000 UNITS capsule Take 5 capsules (5,000 Units) by mouth daily (Patient taking differently: Take 1 capsule by mouth daily) 450 capsule 1     fish oil-omega-3 fatty acids 1000 MG capsule Take 1 g by mouth 2 times daily       OZEMPIC, 1 MG/DOSE, 2 MG/1.5ML pen Inject 0.5 mg Subcutaneous every 7 days Every Sunday.       predniSONE (DELTASONE) 10 MG tablet Take 10 mg by mouth daily       " "sodium bicarbonate 650 MG tablet Take 1 tablet (650 mg) by mouth 2 times daily (Patient taking differently: Take 650 mg by mouth 2 times daily as needed (low bicarbonate on labs)) 180 tablet 3     sulfamethoxazole-trimethoprim (BACTRIM) 400-80 MG tablet Take 1 tablet by mouth every other day 45 tablet 3     azaTHIOprine (IMURAN) 50 MG tablet Take 100 mg by mouth every morning (Patient not taking: Reported on 9/3/2022)       blood glucose (PHUONG CONTOUR NEXT) test strip Use to test blood sugar three times daily or as directed. (Patient not taking: No sig reported) 100 strip prn     tacrolimus (GENERIC EQUIVALENT) 0.5 MG capsule Take 1 capsule (0.5 mg) by mouth 2 times daily (Patient not taking: No sig reported) 180 capsule 3     tacrolimus (GENERIC EQUIVALENT) 1 MG capsule Take 1 mg by mouth every morning (Patient not taking: Reported on 9/3/2022)       Objective   Physical Exam:    Blood pressure 94/57, pulse 71, temperature 98.5  F (36.9  C), temperature source Oral, resp. rate 16, height 1.638 m (5' 4.5\"), weight 73.5 kg (162 lb), SpO2 100 %.  Physical Exam  Constitutional:       General: She is not in acute distress.  HENT:      Head: Normocephalic and atraumatic.      Mouth/Throat:      Mouth: Mucous membranes are moist.   Eyes:      General: No scleral icterus.  Cardiovascular:      Rate and Rhythm: Normal rate and regular rhythm.   Pulmonary:      Effort: Pulmonary effort is normal. No respiratory distress.   Abdominal:      General: There is no distension.      Palpations: Abdomen is soft.   Skin:     General: Skin is warm and dry.   Neurological:      Mental Status: She is alert. Mental status is at baseline.         Labs & Studies: I personally reviewed the following studies:  ROUTINE LABS (Last four results):  CMP  Recent Labs   Lab 09/03/22  0835 09/02/22  1919 09/01/22  1256 08/29/22  1333   NA  --  134* 127* 133   POTASSIUM  --  2.7* 3.2* 4.4   CHLORIDE  --  104 99 103   CO2  --  19* 17* 23   ANIONGAP  " --  11 11 7   * 174* 248* 249*   BUN  --  37.6* 28 34*   CR  --  2.01* 1.81* 1.05*   GFRESTIMATED  --  28* 32* 62   NATASHA  --  8.2* 8.8 8.6   MAG  --  2.0  --   --    PHOS  --   --  2.3* 2.4*   PROTTOTAL  --  6.9  --   --    ALBUMIN  --  3.4*  --   --    BILITOTAL  --  0.2  --   --    ALKPHOS  --  59  --   --    AST  --  13  --   --    ALT  --  15  --   --      CBC  Recent Labs   Lab 09/02/22  1919 09/01/22  1256 08/29/22  1333   WBC 1.6* 0.4* 3.6*   RBC 2.71* 3.39* 3.09*   HGB 7.7* 9.5* 8.8*   HCT 22.7* 28.7* 27.4*   MCV 84 85 89   MCH 28.4 28.0 28.5   MCHC 33.9 33.1 32.1   RDW 14.1 14.1 14.3    227 220     INRNo lab results found in last 7 days.    Imaging:  No orders to display         6/30/2022 Biopsy/Pathology     A. Soft Tissue, Small bowel, fine needle aspiration   (smears/core biopsy): Positive for malignancy. Monomorphic   post-transplant lymphoproliferative disorder, diffuse large   B-cell lymphoma-like, EBV-negative. See comment.     COMMENT   The patient's clinical history of a kidney transplant in   2011 is noted. Given the patient's history, the overall   findings support the diagnosis of monomorphic   post-transplant lymphoproliferative disorder, diffuse large   B-cell lymphoma-like, EBV-negative. It shows a germinal   center B-cell phenotype by the Maulik algorithm, and is   BCL2-negative/MYC-positive by immunohistochemistry. No MYC   rearrangement was identified by FISH.     Histologic sections show core biopsies of tissue involved   by   an atypical lymphoid infiltrate composed of sheets of large   atypical lymphoid cells with irregular nuclear contours,   vesicular chromatin, and modest amounts of cytoplasm.     Immunoperoxidase studies were performed on paraffin   sections   of the small bowel (block A1) using antibodies directed   against the following antigens: CD3, CD10, CD20, BCL2,   BCL6,   MUM1, and MYC. Current cutoff values for positivity are:   CD10, BCL6 and MUM1 greater  "than/equal to 30%; MYC greater   than/equal to 40%; BCL2 greater than 50%. The infiltrate is   composed of CD20-positive B-cells that coexpress CD10   (100%), BCL6 (100%), and MYC (100%). The infiltrate is   negative for the remaining tested markers including BCL2   (<10%) and MUM1 (<10%).     In situ hybridization was performed on paraffin-embedded   sections of the small bowel (block A1) specimen using   probes   that recognize Reji-Barr virus encoded RNA (KLAUS). KLAUS   is negative.     FISH analysis for BLYM, small bowel ([id number]; block A1;   06/30/2022):     No rearrangement of MYC, BCL2 or BCL6 and no fusion of MYC   and IGH was observed, therefore this makes unlikely the   possibility of \"high-grade B-cell lymphoma with MYC and   BCL2   and/or BCL6 rearrangements\" (double-hit lymphoma; Larry   et al., WHO Classification of Tumours of Haematopoietic and   Lymphoid Tissues, [employer], 2017).      "

## 2022-09-03 NOTE — ED TRIAGE NOTES
Pt presents to ER via EMS post chemo 1 week ago with dehydration and GISELLA. Oncologist advised pt to come to ER

## 2022-09-06 ENCOUNTER — LAB (OUTPATIENT)
Dept: LAB | Facility: CLINIC | Age: 57
End: 2022-09-06
Payer: COMMERCIAL

## 2022-09-06 DIAGNOSIS — D47.Z1 PTLD (POST-TRANSPLANT LYMPHOPROLIFERATIVE DISORDER) (H): ICD-10-CM

## 2022-09-06 DIAGNOSIS — Z94.0 KIDNEY TRANSPLANTED: ICD-10-CM

## 2022-09-06 DIAGNOSIS — Z48.298 AFTERCARE FOLLOWING ORGAN TRANSPLANT: ICD-10-CM

## 2022-09-06 LAB
BASOPHILS # BLD MANUAL: 0 10E3/UL (ref 0–0.2)
BASOPHILS NFR BLD MANUAL: 0 %
EOSINOPHIL # BLD MANUAL: 0 10E3/UL (ref 0–0.7)
EOSINOPHIL NFR BLD MANUAL: 0 %
ERYTHROCYTE [DISTWIDTH] IN BLOOD BY AUTOMATED COUNT: 14.6 % (ref 10–15)
HCT VFR BLD AUTO: 25.5 % (ref 35–47)
HGB BLD-MCNC: 7.9 G/DL (ref 11.7–15.7)
HOLD SPECIMEN: NORMAL
LYMPHOCYTES # BLD MANUAL: 0.5 10E3/UL (ref 0.8–5.3)
LYMPHOCYTES NFR BLD MANUAL: 6 %
MCH RBC QN AUTO: 27.8 PG (ref 26.5–33)
MCHC RBC AUTO-ENTMCNC: 31 G/DL (ref 31.5–36.5)
MCV RBC AUTO: 90 FL (ref 78–100)
METAMYELOCYTES # BLD MANUAL: 0.2 10E3/UL
METAMYELOCYTES NFR BLD MANUAL: 2 %
MONOCYTES # BLD MANUAL: 0.3 10E3/UL (ref 0–1.3)
MONOCYTES NFR BLD MANUAL: 4 %
MYELOCYTES # BLD MANUAL: 0.2 10E3/UL
MYELOCYTES NFR BLD MANUAL: 2 %
NEUTROPHILS # BLD MANUAL: 6.7 10E3/UL (ref 1.6–8.3)
NEUTROPHILS NFR BLD MANUAL: 86 %
NRBC # BLD AUTO: 0.2 10E3/UL
NRBC BLD MANUAL-RTO: 2 %
PLAT MORPH BLD: ABNORMAL
PLATELET # BLD AUTO: 265 10E3/UL (ref 150–450)
RBC # BLD AUTO: 2.84 10E6/UL (ref 3.8–5.2)
RBC MORPH BLD: ABNORMAL
WBC # BLD AUTO: 7.8 10E3/UL (ref 4–11)

## 2022-09-06 PROCEDURE — 84550 ASSAY OF BLOOD/URIC ACID: CPT

## 2022-09-06 PROCEDURE — 84100 ASSAY OF PHOSPHORUS: CPT

## 2022-09-06 PROCEDURE — 80048 BASIC METABOLIC PNL TOTAL CA: CPT

## 2022-09-06 PROCEDURE — 85007 BL SMEAR W/DIFF WBC COUNT: CPT

## 2022-09-06 PROCEDURE — 85027 COMPLETE CBC AUTOMATED: CPT

## 2022-09-06 PROCEDURE — 36415 COLL VENOUS BLD VENIPUNCTURE: CPT

## 2022-09-07 LAB
ANION GAP SERPL CALCULATED.3IONS-SCNC: 5 MMOL/L (ref 3–14)
BACTERIA BLD CULT: NO GROWTH
BACTERIA BLD CULT: NO GROWTH
BUN SERPL-MCNC: 25 MG/DL (ref 7–30)
CALCIUM SERPL-MCNC: 9.4 MG/DL (ref 8.5–10.1)
CHLORIDE BLD-SCNC: 105 MMOL/L (ref 94–109)
CO2 SERPL-SCNC: 25 MMOL/L (ref 20–32)
CREAT SERPL-MCNC: 1.15 MG/DL (ref 0.52–1.04)
GFR SERPL CREATININE-BSD FRML MDRD: 55 ML/MIN/1.73M2
GLUCOSE BLD-MCNC: 242 MG/DL (ref 70–99)
PHOSPHATE SERPL-MCNC: 3.2 MG/DL (ref 2.5–4.5)
POTASSIUM BLD-SCNC: 4.6 MMOL/L (ref 3.4–5.3)
SODIUM SERPL-SCNC: 135 MMOL/L (ref 133–144)
URATE SERPL-MCNC: 4.7 MG/DL (ref 2.6–6)

## 2022-09-08 ENCOUNTER — LAB (OUTPATIENT)
Dept: LAB | Facility: CLINIC | Age: 57
End: 2022-09-08
Payer: COMMERCIAL

## 2022-09-08 DIAGNOSIS — D47.Z1 PTLD (POST-TRANSPLANT LYMPHOPROLIFERATIVE DISORDER) (H): ICD-10-CM

## 2022-09-08 LAB
BASOPHILS # BLD AUTO: 0 10E3/UL (ref 0–0.2)
BASOPHILS NFR BLD AUTO: 0 %
EOSINOPHIL # BLD AUTO: 0 10E3/UL (ref 0–0.7)
EOSINOPHIL NFR BLD AUTO: 0 %
ERYTHROCYTE [DISTWIDTH] IN BLOOD BY AUTOMATED COUNT: 16.3 % (ref 10–15)
HCT VFR BLD AUTO: 26 % (ref 35–47)
HGB BLD-MCNC: 8.3 G/DL (ref 11.7–15.7)
HOLD SPECIMEN: NORMAL
LYMPHOCYTES # BLD AUTO: 0.2 10E3/UL (ref 0.8–5.3)
LYMPHOCYTES NFR BLD AUTO: 3 %
MCH RBC QN AUTO: 29.3 PG (ref 26.5–33)
MCHC RBC AUTO-ENTMCNC: 31.9 G/DL (ref 31.5–36.5)
MCV RBC AUTO: 92 FL (ref 78–100)
MONOCYTES # BLD AUTO: 0.7 10E3/UL (ref 0–1.3)
MONOCYTES NFR BLD AUTO: 8 %
NEUTROPHILS # BLD AUTO: 7.5 10E3/UL (ref 1.6–8.3)
NEUTROPHILS NFR BLD AUTO: 89 %
PLATELET # BLD AUTO: 390 10E3/UL (ref 150–450)
RBC # BLD AUTO: 2.83 10E6/UL (ref 3.8–5.2)
WBC # BLD AUTO: 8.4 10E3/UL (ref 4–11)

## 2022-09-08 PROCEDURE — 84100 ASSAY OF PHOSPHORUS: CPT

## 2022-09-08 PROCEDURE — 36415 COLL VENOUS BLD VENIPUNCTURE: CPT

## 2022-09-08 PROCEDURE — 80048 BASIC METABOLIC PNL TOTAL CA: CPT

## 2022-09-08 PROCEDURE — 84550 ASSAY OF BLOOD/URIC ACID: CPT

## 2022-09-08 PROCEDURE — 85025 COMPLETE CBC W/AUTO DIFF WBC: CPT

## 2022-09-10 LAB
ANION GAP SERPL CALCULATED.3IONS-SCNC: 8 MMOL/L (ref 3–14)
BUN SERPL-MCNC: 25 MG/DL (ref 7–30)
CALCIUM SERPL-MCNC: 9.2 MG/DL (ref 8.5–10.1)
CHLORIDE BLD-SCNC: 104 MMOL/L (ref 94–109)
CO2 SERPL-SCNC: 23 MMOL/L (ref 20–32)
CREAT SERPL-MCNC: 1.16 MG/DL (ref 0.52–1.04)
GFR SERPL CREATININE-BSD FRML MDRD: 55 ML/MIN/1.73M2
GLUCOSE BLD-MCNC: 251 MG/DL (ref 70–99)
PHOSPHATE SERPL-MCNC: 2.7 MG/DL (ref 2.5–4.5)
POTASSIUM BLD-SCNC: 5 MMOL/L (ref 3.4–5.3)
SODIUM SERPL-SCNC: 135 MMOL/L (ref 133–144)
URATE SERPL-MCNC: 6 MG/DL (ref 2.6–6)

## 2022-09-14 DIAGNOSIS — D47.Z1 POST-TRANSPLANT LYMPHOPROLIFERATIVE DISORDER (H): Primary | ICD-10-CM

## 2022-09-14 DIAGNOSIS — T45.1X5A CHEMOTHERAPY-INDUCED NEUTROPENIA (H): ICD-10-CM

## 2022-09-14 DIAGNOSIS — D70.1 CHEMOTHERAPY-INDUCED NEUTROPENIA (H): ICD-10-CM

## 2022-09-19 ENCOUNTER — LAB (OUTPATIENT)
Dept: LAB | Facility: CLINIC | Age: 57
End: 2022-09-19
Payer: COMMERCIAL

## 2022-09-19 DIAGNOSIS — D47.Z1 POST-TRANSPLANT LYMPHOPROLIFERATIVE DISORDER (H): ICD-10-CM

## 2022-09-19 DIAGNOSIS — D70.1 CHEMOTHERAPY-INDUCED NEUTROPENIA (H): ICD-10-CM

## 2022-09-19 DIAGNOSIS — T45.1X5A CHEMOTHERAPY-INDUCED NEUTROPENIA (H): ICD-10-CM

## 2022-09-19 DIAGNOSIS — D47.Z1 PTLD (POST-TRANSPLANT LYMPHOPROLIFERATIVE DISORDER) (H): ICD-10-CM

## 2022-09-19 LAB
BASOPHILS # BLD MANUAL: 0 10E3/UL (ref 0–0.2)
BASOPHILS NFR BLD MANUAL: 0 %
EOSINOPHIL # BLD MANUAL: 0 10E3/UL (ref 0–0.7)
EOSINOPHIL NFR BLD MANUAL: 0 %
ERYTHROCYTE [DISTWIDTH] IN BLOOD BY AUTOMATED COUNT: 18.4 % (ref 10–15)
HCT VFR BLD AUTO: 28.5 % (ref 35–47)
HGB BLD-MCNC: 8.9 G/DL (ref 11.7–15.7)
HOLD SPECIMEN: NORMAL
LYMPHOCYTES # BLD MANUAL: 0.1 10E3/UL (ref 0.8–5.3)
LYMPHOCYTES NFR BLD MANUAL: 4 %
MCH RBC QN AUTO: 29.2 PG (ref 26.5–33)
MCHC RBC AUTO-ENTMCNC: 31.2 G/DL (ref 31.5–36.5)
MCV RBC AUTO: 93 FL (ref 78–100)
MONOCYTES # BLD MANUAL: 0 10E3/UL (ref 0–1.3)
MONOCYTES NFR BLD MANUAL: 0 %
NEUTROPHILS # BLD MANUAL: 3.5 10E3/UL (ref 1.6–8.3)
NEUTROPHILS NFR BLD MANUAL: 96 %
PLAT MORPH BLD: ABNORMAL
PLATELET # BLD AUTO: 265 10E3/UL (ref 150–450)
RBC # BLD AUTO: 3.05 10E6/UL (ref 3.8–5.2)
RBC MORPH BLD: ABNORMAL
WBC # BLD AUTO: 3.6 10E3/UL (ref 4–11)

## 2022-09-19 PROCEDURE — 36415 COLL VENOUS BLD VENIPUNCTURE: CPT

## 2022-09-19 PROCEDURE — 84100 ASSAY OF PHOSPHORUS: CPT

## 2022-09-19 PROCEDURE — 85007 BL SMEAR W/DIFF WBC COUNT: CPT

## 2022-09-19 PROCEDURE — 80048 BASIC METABOLIC PNL TOTAL CA: CPT

## 2022-09-19 PROCEDURE — 85027 COMPLETE CBC AUTOMATED: CPT

## 2022-09-19 PROCEDURE — 84550 ASSAY OF BLOOD/URIC ACID: CPT

## 2022-09-20 LAB
ANION GAP SERPL CALCULATED.3IONS-SCNC: 8 MMOL/L (ref 3–14)
BUN SERPL-MCNC: 37 MG/DL (ref 7–30)
CALCIUM SERPL-MCNC: 9.1 MG/DL (ref 8.5–10.1)
CHLORIDE BLD-SCNC: 101 MMOL/L (ref 94–109)
CO2 SERPL-SCNC: 23 MMOL/L (ref 20–32)
CREAT SERPL-MCNC: 1.04 MG/DL (ref 0.52–1.04)
GFR SERPL CREATININE-BSD FRML MDRD: 62 ML/MIN/1.73M2
GLUCOSE BLD-MCNC: 325 MG/DL (ref 70–99)
PHOSPHATE SERPL-MCNC: 2.5 MG/DL (ref 2.5–4.5)
POTASSIUM BLD-SCNC: 4.4 MMOL/L (ref 3.4–5.3)
SODIUM SERPL-SCNC: 132 MMOL/L (ref 133–144)
URATE SERPL-MCNC: 6.4 MG/DL (ref 2.6–6)

## 2022-09-26 ENCOUNTER — LAB (OUTPATIENT)
Dept: LAB | Facility: CLINIC | Age: 57
End: 2022-09-26
Payer: COMMERCIAL

## 2022-09-26 DIAGNOSIS — D47.Z1 PTLD (POST-TRANSPLANT LYMPHOPROLIFERATIVE DISORDER) (H): ICD-10-CM

## 2022-09-26 LAB
BASOPHILS # BLD MANUAL: 0 10E3/UL (ref 0–0.2)
BASOPHILS NFR BLD MANUAL: 0 %
DACRYOCYTES BLD QL SMEAR: SLIGHT
EOSINOPHIL # BLD MANUAL: 0 10E3/UL (ref 0–0.7)
EOSINOPHIL NFR BLD MANUAL: 0 %
ERYTHROCYTE [DISTWIDTH] IN BLOOD BY AUTOMATED COUNT: 19.3 % (ref 10–15)
HCT VFR BLD AUTO: 25.6 % (ref 35–47)
HGB BLD-MCNC: 7.9 G/DL (ref 11.7–15.7)
LYMPHOCYTES # BLD MANUAL: 0 10E3/UL (ref 0.8–5.3)
LYMPHOCYTES NFR BLD MANUAL: 0 %
MCH RBC QN AUTO: 28.9 PG (ref 26.5–33)
MCHC RBC AUTO-ENTMCNC: 30.9 G/DL (ref 31.5–36.5)
MCV RBC AUTO: 94 FL (ref 78–100)
METAMYELOCYTES # BLD MANUAL: 0.5 10E3/UL
METAMYELOCYTES NFR BLD MANUAL: 5 %
MONOCYTES # BLD MANUAL: 0.4 10E3/UL (ref 0–1.3)
MONOCYTES NFR BLD MANUAL: 4 %
MYELOCYTES # BLD MANUAL: 0.3 10E3/UL
MYELOCYTES NFR BLD MANUAL: 3 %
NEUTROPHILS # BLD MANUAL: 8.4 10E3/UL (ref 1.6–8.3)
NEUTROPHILS NFR BLD MANUAL: 88 %
PLAT MORPH BLD: ABNORMAL
PLATELET # BLD AUTO: 173 10E3/UL (ref 150–450)
POLYCHROMASIA BLD QL SMEAR: SLIGHT
RBC # BLD AUTO: 2.73 10E6/UL (ref 3.8–5.2)
RBC MORPH BLD: ABNORMAL
RBC MORPH BLD: NORMAL
TOXIC GRANULES BLD QL SMEAR: PRESENT
WBC # BLD AUTO: 9.6 10E3/UL (ref 4–11)

## 2022-09-26 PROCEDURE — 36415 COLL VENOUS BLD VENIPUNCTURE: CPT

## 2022-09-26 PROCEDURE — 85007 BL SMEAR W/DIFF WBC COUNT: CPT

## 2022-09-26 PROCEDURE — 85027 COMPLETE CBC AUTOMATED: CPT

## 2022-10-10 ENCOUNTER — LAB (OUTPATIENT)
Dept: LAB | Facility: CLINIC | Age: 57
End: 2022-10-10
Payer: COMMERCIAL

## 2022-10-10 DIAGNOSIS — D47.Z1 POST-TRANSPLANT LYMPHOPROLIFERATIVE DISORDER (H): ICD-10-CM

## 2022-10-10 DIAGNOSIS — D70.1 CHEMOTHERAPY-INDUCED NEUTROPENIA (H): ICD-10-CM

## 2022-10-10 DIAGNOSIS — T45.1X5A CHEMOTHERAPY-INDUCED NEUTROPENIA (H): ICD-10-CM

## 2022-10-10 LAB
BASOPHILS # BLD MANUAL: 0 10E3/UL (ref 0–0.2)
BASOPHILS NFR BLD MANUAL: 0 %
DACRYOCYTES BLD QL SMEAR: SLIGHT
EOSINOPHIL # BLD MANUAL: 0 10E3/UL (ref 0–0.7)
EOSINOPHIL NFR BLD MANUAL: 0 %
ERYTHROCYTE [DISTWIDTH] IN BLOOD BY AUTOMATED COUNT: 19.1 % (ref 10–15)
HCT VFR BLD AUTO: 27.7 % (ref 35–47)
HGB BLD-MCNC: 8.8 G/DL (ref 11.7–15.7)
LYMPHOCYTES # BLD MANUAL: 0.3 10E3/UL (ref 0.8–5.3)
LYMPHOCYTES NFR BLD MANUAL: 5 %
MCH RBC QN AUTO: 30 PG (ref 26.5–33)
MCHC RBC AUTO-ENTMCNC: 31.8 G/DL (ref 31.5–36.5)
MCV RBC AUTO: 95 FL (ref 78–100)
METAMYELOCYTES # BLD MANUAL: 0.1 10E3/UL
METAMYELOCYTES NFR BLD MANUAL: 1 %
MONOCYTES # BLD MANUAL: 0 10E3/UL (ref 0–1.3)
MONOCYTES NFR BLD MANUAL: 0 %
NEUTROPHILS # BLD MANUAL: 5.4 10E3/UL (ref 1.6–8.3)
NEUTROPHILS NFR BLD MANUAL: 94 %
PLAT MORPH BLD: ABNORMAL
PLATELET # BLD AUTO: 204 10E3/UL (ref 150–450)
RBC # BLD AUTO: 2.93 10E6/UL (ref 3.8–5.2)
RBC MORPH BLD: ABNORMAL
WBC # BLD AUTO: 5.7 10E3/UL (ref 4–11)

## 2022-10-10 PROCEDURE — 85007 BL SMEAR W/DIFF WBC COUNT: CPT

## 2022-10-10 PROCEDURE — 36415 COLL VENOUS BLD VENIPUNCTURE: CPT

## 2022-10-10 PROCEDURE — 85027 COMPLETE CBC AUTOMATED: CPT

## 2022-10-15 ENCOUNTER — HEALTH MAINTENANCE LETTER (OUTPATIENT)
Age: 57
End: 2022-10-15

## 2022-10-18 ENCOUNTER — LAB (OUTPATIENT)
Dept: LAB | Facility: CLINIC | Age: 57
End: 2022-10-18
Payer: COMMERCIAL

## 2022-10-18 DIAGNOSIS — T45.1X5A CHEMOTHERAPY-INDUCED NEUTROPENIA (H): ICD-10-CM

## 2022-10-18 DIAGNOSIS — D70.1 CHEMOTHERAPY-INDUCED NEUTROPENIA (H): ICD-10-CM

## 2022-10-18 DIAGNOSIS — D47.Z1 POST-TRANSPLANT LYMPHOPROLIFERATIVE DISORDER (H): ICD-10-CM

## 2022-10-18 LAB
BASOPHILS # BLD AUTO: 0.1 10E3/UL (ref 0–0.2)
BASOPHILS NFR BLD AUTO: 1 %
EOSINOPHIL # BLD AUTO: 0 10E3/UL (ref 0–0.7)
EOSINOPHIL NFR BLD AUTO: 0 %
ERYTHROCYTE [DISTWIDTH] IN BLOOD BY AUTOMATED COUNT: 20.1 % (ref 10–15)
HCT VFR BLD AUTO: 29.7 % (ref 35–47)
HGB BLD-MCNC: 9.5 G/DL (ref 11.7–15.7)
LYMPHOCYTES # BLD AUTO: 0.4 10E3/UL (ref 0.8–5.3)
LYMPHOCYTES NFR BLD AUTO: 6 %
MCH RBC QN AUTO: 30.5 PG (ref 26.5–33)
MCHC RBC AUTO-ENTMCNC: 32 G/DL (ref 31.5–36.5)
MCV RBC AUTO: 96 FL (ref 78–100)
MONOCYTES # BLD AUTO: 0.8 10E3/UL (ref 0–1.3)
MONOCYTES NFR BLD AUTO: 11 %
NEUTROPHILS # BLD AUTO: 5.9 10E3/UL (ref 1.6–8.3)
NEUTROPHILS NFR BLD AUTO: 82 %
PLATELET # BLD AUTO: 198 10E3/UL (ref 150–450)
RBC # BLD AUTO: 3.11 10E6/UL (ref 3.8–5.2)
WBC # BLD AUTO: 7.2 10E3/UL (ref 4–11)

## 2022-10-18 PROCEDURE — 85025 COMPLETE CBC W/AUTO DIFF WBC: CPT

## 2022-10-18 PROCEDURE — 36415 COLL VENOUS BLD VENIPUNCTURE: CPT

## 2022-11-01 ENCOUNTER — LAB (OUTPATIENT)
Dept: LAB | Facility: CLINIC | Age: 57
End: 2022-11-01
Payer: COMMERCIAL

## 2022-11-01 DIAGNOSIS — D70.1 CHEMOTHERAPY-INDUCED NEUTROPENIA (H): ICD-10-CM

## 2022-11-01 DIAGNOSIS — T45.1X5A CHEMOTHERAPY-INDUCED NEUTROPENIA (H): ICD-10-CM

## 2022-11-01 DIAGNOSIS — D47.Z1 POST-TRANSPLANT LYMPHOPROLIFERATIVE DISORDER (H): ICD-10-CM

## 2022-11-01 LAB
BASOPHILS # BLD MANUAL: 0 10E3/UL (ref 0–0.2)
BASOPHILS NFR BLD MANUAL: 0 %
EOSINOPHIL # BLD MANUAL: 0.1 10E3/UL (ref 0–0.7)
EOSINOPHIL NFR BLD MANUAL: 7 %
ERYTHROCYTE [DISTWIDTH] IN BLOOD BY AUTOMATED COUNT: 17.4 % (ref 10–15)
HCT VFR BLD AUTO: 29.5 % (ref 35–47)
HGB BLD-MCNC: 9.8 G/DL (ref 11.7–15.7)
LYMPHOCYTES # BLD MANUAL: 0.1 10E3/UL (ref 0.8–5.3)
LYMPHOCYTES NFR BLD MANUAL: 9 %
MCH RBC QN AUTO: 31.1 PG (ref 26.5–33)
MCHC RBC AUTO-ENTMCNC: 33.2 G/DL (ref 31.5–36.5)
MCV RBC AUTO: 94 FL (ref 78–100)
MONOCYTES # BLD MANUAL: 0 10E3/UL (ref 0–1.3)
MONOCYTES NFR BLD MANUAL: 1 %
NEUTROPHILS # BLD MANUAL: 1 10E3/UL (ref 1.6–8.3)
NEUTROPHILS NFR BLD MANUAL: 83 %
PLAT MORPH BLD: ABNORMAL
PLATELET # BLD AUTO: 223 10E3/UL (ref 150–450)
RBC # BLD AUTO: 3.15 10E6/UL (ref 3.8–5.2)
RBC MORPH BLD: ABNORMAL
WBC # BLD AUTO: 1.2 10E3/UL (ref 4–11)

## 2022-11-01 PROCEDURE — 36415 COLL VENOUS BLD VENIPUNCTURE: CPT

## 2022-11-01 PROCEDURE — 85007 BL SMEAR W/DIFF WBC COUNT: CPT

## 2022-11-01 PROCEDURE — 85027 COMPLETE CBC AUTOMATED: CPT

## 2022-11-09 ENCOUNTER — LAB (OUTPATIENT)
Dept: LAB | Facility: CLINIC | Age: 57
End: 2022-11-09
Payer: COMMERCIAL

## 2022-11-09 DIAGNOSIS — D70.1 CHEMOTHERAPY-INDUCED NEUTROPENIA (H): ICD-10-CM

## 2022-11-09 DIAGNOSIS — T45.1X5A CHEMOTHERAPY-INDUCED NEUTROPENIA (H): ICD-10-CM

## 2022-11-09 DIAGNOSIS — D47.Z1 POST-TRANSPLANT LYMPHOPROLIFERATIVE DISORDER (H): ICD-10-CM

## 2022-11-09 LAB
BASOPHILS # BLD MANUAL: 0.1 10E3/UL (ref 0–0.2)
BASOPHILS NFR BLD MANUAL: 1 %
EOSINOPHIL # BLD MANUAL: 0.2 10E3/UL (ref 0–0.7)
EOSINOPHIL NFR BLD MANUAL: 2 %
ERYTHROCYTE [DISTWIDTH] IN BLOOD BY AUTOMATED COUNT: 19 % (ref 10–15)
HCT VFR BLD AUTO: 29.1 % (ref 35–47)
HGB BLD-MCNC: 9.5 G/DL (ref 11.7–15.7)
LYMPHOCYTES # BLD MANUAL: 0.6 10E3/UL (ref 0.8–5.3)
LYMPHOCYTES NFR BLD MANUAL: 7 %
MCH RBC QN AUTO: 31.8 PG (ref 26.5–33)
MCHC RBC AUTO-ENTMCNC: 32.6 G/DL (ref 31.5–36.5)
MCV RBC AUTO: 97 FL (ref 78–100)
METAMYELOCYTES # BLD MANUAL: 0.2 10E3/UL
METAMYELOCYTES NFR BLD MANUAL: 2 %
MONOCYTES # BLD MANUAL: 0.3 10E3/UL (ref 0–1.3)
MONOCYTES NFR BLD MANUAL: 3 %
MYELOCYTES # BLD MANUAL: 0.2 10E3/UL
MYELOCYTES NFR BLD MANUAL: 2 %
NEUTROPHILS # BLD MANUAL: 7.6 10E3/UL (ref 1.6–8.3)
NEUTROPHILS NFR BLD MANUAL: 83 %
PLAT MORPH BLD: ABNORMAL
PLATELET # BLD AUTO: 224 10E3/UL (ref 150–450)
RBC # BLD AUTO: 2.99 10E6/UL (ref 3.8–5.2)
RBC MORPH BLD: ABNORMAL
WBC # BLD AUTO: 9.2 10E3/UL (ref 4–11)

## 2022-11-09 PROCEDURE — 85027 COMPLETE CBC AUTOMATED: CPT

## 2022-11-09 PROCEDURE — 36415 COLL VENOUS BLD VENIPUNCTURE: CPT

## 2022-11-09 PROCEDURE — 85007 BL SMEAR W/DIFF WBC COUNT: CPT

## 2022-12-13 ENCOUNTER — LAB (OUTPATIENT)
Dept: LAB | Facility: CLINIC | Age: 57
End: 2022-12-13
Payer: COMMERCIAL

## 2022-12-13 DIAGNOSIS — D47.Z1 POST-TRANSPLANT LYMPHOPROLIFERATIVE DISORDER (H): ICD-10-CM

## 2022-12-13 DIAGNOSIS — T45.1X5A CHEMOTHERAPY-INDUCED NEUTROPENIA (H): ICD-10-CM

## 2022-12-13 DIAGNOSIS — D70.1 CHEMOTHERAPY-INDUCED NEUTROPENIA (H): ICD-10-CM

## 2022-12-13 LAB
BASOPHILS # BLD MANUAL: 0 10E3/UL (ref 0–0.2)
BASOPHILS NFR BLD MANUAL: 0 %
DACRYOCYTES BLD QL SMEAR: SLIGHT
EOSINOPHIL # BLD MANUAL: 0 10E3/UL (ref 0–0.7)
EOSINOPHIL NFR BLD MANUAL: 0 %
ERYTHROCYTE [DISTWIDTH] IN BLOOD BY AUTOMATED COUNT: 14.3 % (ref 10–15)
HCT VFR BLD AUTO: 31.5 % (ref 35–47)
HGB BLD-MCNC: 10.2 G/DL (ref 11.7–15.7)
LYMPHOCYTES # BLD MANUAL: 0.1 10E3/UL (ref 0.8–5.3)
LYMPHOCYTES NFR BLD MANUAL: 3 %
MCH RBC QN AUTO: 31.5 PG (ref 26.5–33)
MCHC RBC AUTO-ENTMCNC: 32.4 G/DL (ref 31.5–36.5)
MCV RBC AUTO: 97 FL (ref 78–100)
MONOCYTES # BLD MANUAL: 0.1 10E3/UL (ref 0–1.3)
MONOCYTES NFR BLD MANUAL: 2 %
NEUTROPHILS # BLD MANUAL: 3.8 10E3/UL (ref 1.6–8.3)
NEUTROPHILS NFR BLD MANUAL: 95 %
PLAT MORPH BLD: ABNORMAL
PLATELET # BLD AUTO: 135 10E3/UL (ref 150–450)
RBC # BLD AUTO: 3.24 10E6/UL (ref 3.8–5.2)
RBC MORPH BLD: ABNORMAL
WBC # BLD AUTO: 4 10E3/UL (ref 4–11)

## 2022-12-13 PROCEDURE — 85027 COMPLETE CBC AUTOMATED: CPT

## 2022-12-13 PROCEDURE — 85007 BL SMEAR W/DIFF WBC COUNT: CPT

## 2022-12-13 PROCEDURE — 36415 COLL VENOUS BLD VENIPUNCTURE: CPT

## 2022-12-20 ENCOUNTER — LAB (OUTPATIENT)
Dept: LAB | Facility: CLINIC | Age: 57
End: 2022-12-20
Payer: COMMERCIAL

## 2022-12-20 DIAGNOSIS — D47.Z1 POST-TRANSPLANT LYMPHOPROLIFERATIVE DISORDER (H): ICD-10-CM

## 2022-12-20 DIAGNOSIS — T45.1X5A CHEMOTHERAPY-INDUCED NEUTROPENIA (H): ICD-10-CM

## 2022-12-20 DIAGNOSIS — D70.1 CHEMOTHERAPY-INDUCED NEUTROPENIA (H): ICD-10-CM

## 2022-12-20 LAB
BASOPHILS # BLD MANUAL: 0.1 10E3/UL (ref 0–0.2)
BASOPHILS NFR BLD MANUAL: 1 %
DACRYOCYTES BLD QL SMEAR: ABNORMAL
EOSINOPHIL # BLD MANUAL: 0 10E3/UL (ref 0–0.7)
EOSINOPHIL NFR BLD MANUAL: 0 %
ERYTHROCYTE [DISTWIDTH] IN BLOOD BY AUTOMATED COUNT: 15.2 % (ref 10–15)
FRAGMENTS BLD QL SMEAR: SLIGHT
HCT VFR BLD AUTO: 31.8 % (ref 35–47)
HGB BLD-MCNC: 10.4 G/DL (ref 11.7–15.7)
LYMPHOCYTES # BLD MANUAL: 0.5 10E3/UL (ref 0.8–5.3)
LYMPHOCYTES NFR BLD MANUAL: 7 %
MCH RBC QN AUTO: 31 PG (ref 26.5–33)
MCHC RBC AUTO-ENTMCNC: 32.7 G/DL (ref 31.5–36.5)
MCV RBC AUTO: 95 FL (ref 78–100)
METAMYELOCYTES # BLD MANUAL: 0.3 10E3/UL
METAMYELOCYTES NFR BLD MANUAL: 4 %
MONOCYTES # BLD MANUAL: 0.3 10E3/UL (ref 0–1.3)
MONOCYTES NFR BLD MANUAL: 5 %
MYELOCYTES # BLD MANUAL: 0.4 10E3/UL
MYELOCYTES NFR BLD MANUAL: 6 %
NEUTROPHILS # BLD MANUAL: 5 10E3/UL (ref 1.6–8.3)
NEUTROPHILS NFR BLD MANUAL: 77 %
NRBC # BLD AUTO: 0.2 10E3/UL
NRBC BLD MANUAL-RTO: 3 %
PLAT MORPH BLD: ABNORMAL
PLATELET # BLD AUTO: 149 10E3/UL (ref 150–450)
RBC # BLD AUTO: 3.36 10E6/UL (ref 3.8–5.2)
RBC MORPH BLD: ABNORMAL
TOXIC GRANULES BLD QL SMEAR: PRESENT
WBC # BLD AUTO: 6.5 10E3/UL (ref 4–11)

## 2022-12-20 PROCEDURE — 36415 COLL VENOUS BLD VENIPUNCTURE: CPT

## 2022-12-20 PROCEDURE — 85027 COMPLETE CBC AUTOMATED: CPT

## 2022-12-20 PROCEDURE — 85007 BL SMEAR W/DIFF WBC COUNT: CPT

## 2023-02-02 NOTE — PROGRESS NOTES
Patient called to let Dr. Medeiros she had labs drawn and to notify him when she had labs. Labs are ordered by transplant  Following her wbc   7/15/2020 2.7   5/19 3  3/4 3.2        Negative

## 2023-02-06 ENCOUNTER — MEDICAL CORRESPONDENCE (OUTPATIENT)
Dept: HEALTH INFORMATION MANAGEMENT | Facility: CLINIC | Age: 58
End: 2023-02-06
Payer: COMMERCIAL

## 2023-03-01 DIAGNOSIS — D84.9 IMMUNODEFICIENCY (H): ICD-10-CM

## 2023-03-01 DIAGNOSIS — Z94.0 KIDNEY REPLACED BY TRANSPLANT: Primary | ICD-10-CM

## 2023-03-02 ENCOUNTER — LAB (OUTPATIENT)
Dept: LAB | Facility: CLINIC | Age: 58
End: 2023-03-02
Payer: COMMERCIAL

## 2023-03-02 DIAGNOSIS — Z94.0 KIDNEY REPLACED BY TRANSPLANT: ICD-10-CM

## 2023-03-02 DIAGNOSIS — D84.9 IMMUNODEFICIENCY (H): ICD-10-CM

## 2023-03-02 LAB
ANION GAP SERPL CALCULATED.3IONS-SCNC: 13 MMOL/L (ref 7–15)
BASOPHILS # BLD AUTO: 0 10E3/UL (ref 0–0.2)
BASOPHILS NFR BLD AUTO: 1 %
BUN SERPL-MCNC: 15.3 MG/DL (ref 6–20)
CALCIUM SERPL-MCNC: 9.9 MG/DL (ref 8.6–10)
CHLORIDE SERPL-SCNC: 105 MMOL/L (ref 98–107)
CREAT SERPL-MCNC: 1.29 MG/DL (ref 0.51–0.95)
DEPRECATED HCO3 PLAS-SCNC: 20 MMOL/L (ref 22–29)
EOSINOPHIL # BLD AUTO: 0.1 10E3/UL (ref 0–0.7)
EOSINOPHIL NFR BLD AUTO: 3 %
ERYTHROCYTE [DISTWIDTH] IN BLOOD BY AUTOMATED COUNT: 14.4 % (ref 10–15)
GFR SERPL CREATININE-BSD FRML MDRD: 48 ML/MIN/1.73M2
GLUCOSE SERPL-MCNC: 169 MG/DL (ref 70–99)
HCT VFR BLD AUTO: 35.9 % (ref 35–47)
HGB BLD-MCNC: 11.9 G/DL (ref 11.7–15.7)
LYMPHOCYTES # BLD AUTO: 0.5 10E3/UL (ref 0.8–5.3)
LYMPHOCYTES NFR BLD AUTO: 15 %
MCH RBC QN AUTO: 30.4 PG (ref 26.5–33)
MCHC RBC AUTO-ENTMCNC: 33.1 G/DL (ref 31.5–36.5)
MCV RBC AUTO: 92 FL (ref 78–100)
MONOCYTES # BLD AUTO: 0.5 10E3/UL (ref 0–1.3)
MONOCYTES NFR BLD AUTO: 16 %
NEUTROPHILS # BLD AUTO: 2.3 10E3/UL (ref 1.6–8.3)
NEUTROPHILS NFR BLD AUTO: 66 %
PLATELET # BLD AUTO: 181 10E3/UL (ref 150–450)
POTASSIUM SERPL-SCNC: 4.4 MMOL/L (ref 3.4–5.3)
RBC # BLD AUTO: 3.92 10E6/UL (ref 3.8–5.2)
SODIUM SERPL-SCNC: 138 MMOL/L (ref 136–145)
TACROLIMUS BLD-MCNC: 4.5 UG/L (ref 5–15)
TME LAST DOSE: ABNORMAL H
TME LAST DOSE: ABNORMAL H
WBC # BLD AUTO: 3.4 10E3/UL (ref 4–11)

## 2023-03-02 PROCEDURE — 85025 COMPLETE CBC W/AUTO DIFF WBC: CPT

## 2023-03-02 PROCEDURE — 36415 COLL VENOUS BLD VENIPUNCTURE: CPT

## 2023-03-02 PROCEDURE — 80197 ASSAY OF TACROLIMUS: CPT

## 2023-03-02 PROCEDURE — 80048 BASIC METABOLIC PNL TOTAL CA: CPT

## 2023-03-09 ENCOUNTER — LAB (OUTPATIENT)
Dept: LAB | Facility: CLINIC | Age: 58
End: 2023-03-09
Payer: COMMERCIAL

## 2023-03-09 DIAGNOSIS — Z94.0 KIDNEY REPLACED BY TRANSPLANT: ICD-10-CM

## 2023-03-09 DIAGNOSIS — Z94.0 KIDNEY TRANSPLANTED: ICD-10-CM

## 2023-03-09 DIAGNOSIS — Z79.899 ENCOUNTER FOR LONG-TERM CURRENT USE OF MEDICATION: ICD-10-CM

## 2023-03-09 DIAGNOSIS — D84.9 IMMUNODEFICIENCY (H): ICD-10-CM

## 2023-03-09 LAB
ANION GAP SERPL CALCULATED.3IONS-SCNC: 15 MMOL/L (ref 7–15)
BASOPHILS # BLD AUTO: 0 10E3/UL (ref 0–0.2)
BASOPHILS NFR BLD AUTO: 1 %
BUN SERPL-MCNC: 16.4 MG/DL (ref 6–20)
CALCIUM SERPL-MCNC: 9.6 MG/DL (ref 8.6–10)
CHLORIDE SERPL-SCNC: 103 MMOL/L (ref 98–107)
CREAT SERPL-MCNC: 1.35 MG/DL (ref 0.51–0.95)
DEPRECATED HCO3 PLAS-SCNC: 20 MMOL/L (ref 22–29)
EOSINOPHIL # BLD AUTO: 0.1 10E3/UL (ref 0–0.7)
EOSINOPHIL NFR BLD AUTO: 2 %
ERYTHROCYTE [DISTWIDTH] IN BLOOD BY AUTOMATED COUNT: 14.6 % (ref 10–15)
GFR SERPL CREATININE-BSD FRML MDRD: 45 ML/MIN/1.73M2
GLUCOSE SERPL-MCNC: 161 MG/DL (ref 70–99)
HCT VFR BLD AUTO: 36.8 % (ref 35–47)
HGB BLD-MCNC: 12.2 G/DL (ref 11.7–15.7)
LYMPHOCYTES # BLD AUTO: 0.7 10E3/UL (ref 0.8–5.3)
LYMPHOCYTES NFR BLD AUTO: 13 %
MCH RBC QN AUTO: 30 PG (ref 26.5–33)
MCHC RBC AUTO-ENTMCNC: 33.2 G/DL (ref 31.5–36.5)
MCV RBC AUTO: 91 FL (ref 78–100)
MONOCYTES # BLD AUTO: 0.7 10E3/UL (ref 0–1.3)
MONOCYTES NFR BLD AUTO: 13 %
NEUTROPHILS # BLD AUTO: 3.9 10E3/UL (ref 1.6–8.3)
NEUTROPHILS NFR BLD AUTO: 72 %
PLATELET # BLD AUTO: 192 10E3/UL (ref 150–450)
POTASSIUM SERPL-SCNC: 3.6 MMOL/L (ref 3.4–5.3)
RBC # BLD AUTO: 4.06 10E6/UL (ref 3.8–5.2)
SODIUM SERPL-SCNC: 138 MMOL/L (ref 136–145)
TACROLIMUS BLD-MCNC: 4.6 UG/L (ref 5–15)
TME LAST DOSE: ABNORMAL H
TME LAST DOSE: ABNORMAL H
WBC # BLD AUTO: 5.4 10E3/UL (ref 4–11)

## 2023-03-09 PROCEDURE — 80197 ASSAY OF TACROLIMUS: CPT

## 2023-03-09 PROCEDURE — 36415 COLL VENOUS BLD VENIPUNCTURE: CPT

## 2023-03-09 PROCEDURE — 80048 BASIC METABOLIC PNL TOTAL CA: CPT

## 2023-03-09 PROCEDURE — 85025 COMPLETE CBC W/AUTO DIFF WBC: CPT

## 2023-03-16 ENCOUNTER — LAB (OUTPATIENT)
Dept: LAB | Facility: CLINIC | Age: 58
End: 2023-03-16
Payer: COMMERCIAL

## 2023-03-16 DIAGNOSIS — Z94.0 KIDNEY REPLACED BY TRANSPLANT: ICD-10-CM

## 2023-03-16 DIAGNOSIS — D84.9 IMMUNODEFICIENCY (H): ICD-10-CM

## 2023-03-16 LAB
ANION GAP SERPL CALCULATED.3IONS-SCNC: 13 MMOL/L (ref 7–15)
BASOPHILS # BLD AUTO: 0 10E3/UL (ref 0–0.2)
BASOPHILS NFR BLD AUTO: 1 %
BUN SERPL-MCNC: 13.3 MG/DL (ref 6–20)
CALCIUM SERPL-MCNC: 9.5 MG/DL (ref 8.6–10)
CHLORIDE SERPL-SCNC: 107 MMOL/L (ref 98–107)
CREAT SERPL-MCNC: 1.44 MG/DL (ref 0.51–0.95)
DEPRECATED HCO3 PLAS-SCNC: 21 MMOL/L (ref 22–29)
EOSINOPHIL # BLD AUTO: 0.1 10E3/UL (ref 0–0.7)
EOSINOPHIL NFR BLD AUTO: 2 %
ERYTHROCYTE [DISTWIDTH] IN BLOOD BY AUTOMATED COUNT: 14.4 % (ref 10–15)
GFR SERPL CREATININE-BSD FRML MDRD: 42 ML/MIN/1.73M2
GLUCOSE SERPL-MCNC: 149 MG/DL (ref 70–99)
HCT VFR BLD AUTO: 35.7 % (ref 35–47)
HGB BLD-MCNC: 12 G/DL (ref 11.7–15.7)
LYMPHOCYTES # BLD AUTO: 0.7 10E3/UL (ref 0.8–5.3)
LYMPHOCYTES NFR BLD AUTO: 18 %
MCH RBC QN AUTO: 30.2 PG (ref 26.5–33)
MCHC RBC AUTO-ENTMCNC: 33.6 G/DL (ref 31.5–36.5)
MCV RBC AUTO: 90 FL (ref 78–100)
MONOCYTES # BLD AUTO: 0.6 10E3/UL (ref 0–1.3)
MONOCYTES NFR BLD AUTO: 16 %
NEUTROPHILS # BLD AUTO: 2.4 10E3/UL (ref 1.6–8.3)
NEUTROPHILS NFR BLD AUTO: 63 %
PLATELET # BLD AUTO: 198 10E3/UL (ref 150–450)
POTASSIUM SERPL-SCNC: 4.3 MMOL/L (ref 3.4–5.3)
RBC # BLD AUTO: 3.97 10E6/UL (ref 3.8–5.2)
SODIUM SERPL-SCNC: 141 MMOL/L (ref 136–145)
TACROLIMUS BLD-MCNC: 4.2 UG/L (ref 5–15)
TME LAST DOSE: ABNORMAL H
TME LAST DOSE: ABNORMAL H
WBC # BLD AUTO: 3.8 10E3/UL (ref 4–11)

## 2023-03-16 PROCEDURE — 36415 COLL VENOUS BLD VENIPUNCTURE: CPT

## 2023-03-16 PROCEDURE — 85025 COMPLETE CBC W/AUTO DIFF WBC: CPT

## 2023-03-16 PROCEDURE — 80048 BASIC METABOLIC PNL TOTAL CA: CPT

## 2023-03-16 PROCEDURE — 80197 ASSAY OF TACROLIMUS: CPT

## 2023-03-26 ENCOUNTER — HEALTH MAINTENANCE LETTER (OUTPATIENT)
Age: 58
End: 2023-03-26

## 2023-05-17 ENCOUNTER — LAB (OUTPATIENT)
Dept: LAB | Facility: CLINIC | Age: 58
End: 2023-05-17
Payer: COMMERCIAL

## 2023-05-17 DIAGNOSIS — D84.9 IMMUNODEFICIENCY (H): ICD-10-CM

## 2023-05-17 DIAGNOSIS — Z94.0 KIDNEY REPLACED BY TRANSPLANT: ICD-10-CM

## 2023-05-17 LAB
TACROLIMUS BLD-MCNC: 6.8 UG/L (ref 5–15)
TME LAST DOSE: NORMAL H
TME LAST DOSE: NORMAL H

## 2023-05-17 PROCEDURE — 36415 COLL VENOUS BLD VENIPUNCTURE: CPT

## 2023-05-17 PROCEDURE — 80197 ASSAY OF TACROLIMUS: CPT

## 2023-07-03 DIAGNOSIS — Z79.2 LONG TERM (CURRENT) USE OF ANTIBIOTICS: Primary | ICD-10-CM

## 2023-07-05 ENCOUNTER — LAB (OUTPATIENT)
Dept: LAB | Facility: CLINIC | Age: 58
End: 2023-07-05
Payer: COMMERCIAL

## 2023-07-05 DIAGNOSIS — Z79.2 LONG TERM (CURRENT) USE OF ANTIBIOTICS: ICD-10-CM

## 2023-07-05 LAB
ALT SERPL W P-5'-P-CCNC: 12 U/L (ref 0–50)
BASOPHILS # BLD AUTO: 0 10E3/UL (ref 0–0.2)
BASOPHILS NFR BLD AUTO: 1 %
CREAT SERPL-MCNC: 1.31 MG/DL (ref 0.51–0.95)
EOSINOPHIL # BLD AUTO: 0.1 10E3/UL (ref 0–0.7)
EOSINOPHIL NFR BLD AUTO: 1 %
ERYTHROCYTE [DISTWIDTH] IN BLOOD BY AUTOMATED COUNT: 18.8 % (ref 10–15)
GFR SERPL CREATININE-BSD FRML MDRD: 47 ML/MIN/1.73M2
HCT VFR BLD AUTO: 33 % (ref 35–47)
HGB BLD-MCNC: 11 G/DL (ref 11.7–15.7)
IMM GRANULOCYTES # BLD: 0 10E3/UL
IMM GRANULOCYTES NFR BLD: 0 %
LYMPHOCYTES # BLD AUTO: 0.7 10E3/UL (ref 0.8–5.3)
LYMPHOCYTES NFR BLD AUTO: 12 %
MCH RBC QN AUTO: 30 PG (ref 26.5–33)
MCHC RBC AUTO-ENTMCNC: 33.3 G/DL (ref 31.5–36.5)
MCV RBC AUTO: 90 FL (ref 78–100)
MONOCYTES # BLD AUTO: 0.4 10E3/UL (ref 0–1.3)
MONOCYTES NFR BLD AUTO: 7 %
NEUTROPHILS # BLD AUTO: 4.6 10E3/UL (ref 1.6–8.3)
NEUTROPHILS NFR BLD AUTO: 79 %
PLATELET # BLD AUTO: 209 10E3/UL (ref 150–450)
RBC # BLD AUTO: 3.67 10E6/UL (ref 3.8–5.2)
WBC # BLD AUTO: 5.9 10E3/UL (ref 4–11)

## 2023-07-05 PROCEDURE — 82565 ASSAY OF CREATININE: CPT

## 2023-07-05 PROCEDURE — 84460 ALANINE AMINO (ALT) (SGPT): CPT

## 2023-07-05 PROCEDURE — 85025 COMPLETE CBC W/AUTO DIFF WBC: CPT

## 2023-07-05 PROCEDURE — 36415 COLL VENOUS BLD VENIPUNCTURE: CPT

## 2023-08-20 ENCOUNTER — HEALTH MAINTENANCE LETTER (OUTPATIENT)
Age: 58
End: 2023-08-20

## 2023-09-27 PROBLEM — A49.8 ESCHERICHIA COLI INFECTION: Status: ACTIVE | Noted: 2023-06-12

## 2023-09-27 PROBLEM — L02.91 ABSCESS: Status: ACTIVE | Noted: 2023-06-18

## 2023-10-29 ENCOUNTER — HEALTH MAINTENANCE LETTER (OUTPATIENT)
Age: 58
End: 2023-10-29

## 2023-11-14 DIAGNOSIS — Z79.899 ENCOUNTER FOR LONG-TERM CURRENT USE OF MEDICATION: ICD-10-CM

## 2023-11-14 DIAGNOSIS — Z98.890 OTHER SPECIFIED POSTPROCEDURAL STATES: ICD-10-CM

## 2023-11-14 DIAGNOSIS — Z48.298 AFTERCARE FOLLOWING ORGAN TRANSPLANT: ICD-10-CM

## 2023-11-14 DIAGNOSIS — Z94.0 KIDNEY REPLACED BY TRANSPLANT: Primary | ICD-10-CM

## 2024-01-07 ENCOUNTER — HEALTH MAINTENANCE LETTER (OUTPATIENT)
Age: 59
End: 2024-01-07

## 2024-01-16 ENCOUNTER — LAB (OUTPATIENT)
Dept: LAB | Facility: CLINIC | Age: 59
End: 2024-01-16
Payer: COMMERCIAL

## 2024-01-16 DIAGNOSIS — Z94.0 TRANSPLANTED KIDNEY: Primary | ICD-10-CM

## 2024-01-16 LAB
ANION GAP SERPL CALCULATED.3IONS-SCNC: 13 MMOL/L (ref 7–15)
BASOPHILS # BLD AUTO: 0 10E3/UL (ref 0–0.2)
BASOPHILS NFR BLD AUTO: 0 %
BUN SERPL-MCNC: 37.1 MG/DL (ref 8–23)
CALCIUM SERPL-MCNC: 10.1 MG/DL (ref 8.6–10)
CHLORIDE SERPL-SCNC: 105 MMOL/L (ref 98–107)
CREAT SERPL-MCNC: 1.8 MG/DL (ref 0.51–0.95)
DEPRECATED HCO3 PLAS-SCNC: 19 MMOL/L (ref 22–29)
EGFRCR SERPLBLD CKD-EPI 2021: 32 ML/MIN/1.73M2
EOSINOPHIL # BLD AUTO: 0.1 10E3/UL (ref 0–0.7)
EOSINOPHIL NFR BLD AUTO: 2 %
ERYTHROCYTE [DISTWIDTH] IN BLOOD BY AUTOMATED COUNT: 14.2 % (ref 10–15)
GLUCOSE SERPL-MCNC: 231 MG/DL (ref 70–99)
HCT VFR BLD AUTO: 36.7 % (ref 35–47)
HGB BLD-MCNC: 12.4 G/DL (ref 11.7–15.7)
IMM GRANULOCYTES # BLD: 0 10E3/UL
IMM GRANULOCYTES NFR BLD: 1 %
LYMPHOCYTES # BLD AUTO: 0.8 10E3/UL (ref 0.8–5.3)
LYMPHOCYTES NFR BLD AUTO: 15 %
MCH RBC QN AUTO: 30.1 PG (ref 26.5–33)
MCHC RBC AUTO-ENTMCNC: 33.8 G/DL (ref 31.5–36.5)
MCV RBC AUTO: 89 FL (ref 78–100)
MONOCYTES # BLD AUTO: 0.7 10E3/UL (ref 0–1.3)
MONOCYTES NFR BLD AUTO: 13 %
NEUTROPHILS # BLD AUTO: 3.6 10E3/UL (ref 1.6–8.3)
NEUTROPHILS NFR BLD AUTO: 69 %
PLATELET # BLD AUTO: 166 10E3/UL (ref 150–450)
POTASSIUM SERPL-SCNC: 4.6 MMOL/L (ref 3.4–5.3)
RBC # BLD AUTO: 4.12 10E6/UL (ref 3.8–5.2)
SODIUM SERPL-SCNC: 137 MMOL/L (ref 135–145)
TACROLIMUS BLD-MCNC: 4.9 UG/L (ref 5–15)
TME LAST DOSE: ABNORMAL H
TME LAST DOSE: ABNORMAL H
WBC # BLD AUTO: 5.3 10E3/UL (ref 4–11)

## 2024-01-16 PROCEDURE — 85025 COMPLETE CBC W/AUTO DIFF WBC: CPT

## 2024-01-16 PROCEDURE — 36415 COLL VENOUS BLD VENIPUNCTURE: CPT

## 2024-01-16 PROCEDURE — 80048 BASIC METABOLIC PNL TOTAL CA: CPT

## 2024-01-16 PROCEDURE — 80197 ASSAY OF TACROLIMUS: CPT

## 2024-05-26 ENCOUNTER — HEALTH MAINTENANCE LETTER (OUTPATIENT)
Age: 59
End: 2024-05-26

## 2024-10-13 ENCOUNTER — HEALTH MAINTENANCE LETTER (OUTPATIENT)
Age: 59
End: 2024-10-13

## 2025-01-25 ENCOUNTER — HEALTH MAINTENANCE LETTER (OUTPATIENT)
Age: 60
End: 2025-01-25

## 2025-05-03 ENCOUNTER — HEALTH MAINTENANCE LETTER (OUTPATIENT)
Age: 60
End: 2025-05-03

## 2025-08-16 ENCOUNTER — HEALTH MAINTENANCE LETTER (OUTPATIENT)
Age: 60
End: 2025-08-16